# Patient Record
Sex: FEMALE | Race: WHITE | Employment: FULL TIME | URBAN - METROPOLITAN AREA
[De-identification: names, ages, dates, MRNs, and addresses within clinical notes are randomized per-mention and may not be internally consistent; named-entity substitution may affect disease eponyms.]

---

## 2017-02-09 DIAGNOSIS — F41.8 SITUATIONAL ANXIETY: ICD-10-CM

## 2017-02-09 RX ORDER — CLONAZEPAM 0.5 MG/1
TABLET ORAL
Qty: 30 TAB | Refills: 0 | Status: CANCELLED | OUTPATIENT
Start: 2017-02-09

## 2017-02-10 DIAGNOSIS — F41.8 SITUATIONAL ANXIETY: ICD-10-CM

## 2017-02-10 RX ORDER — CLONAZEPAM 0.5 MG/1
TABLET ORAL
Qty: 30 TAB | Refills: 0 | OUTPATIENT
Start: 2017-02-10 | End: 2017-04-04 | Stop reason: SDUPTHER

## 2017-02-10 RX ORDER — SERTRALINE HYDROCHLORIDE 50 MG/1
50 TABLET, FILM COATED ORAL DAILY
Qty: 90 TAB | Refills: 0 | Status: SHIPPED | OUTPATIENT
Start: 2017-02-10 | End: 2017-06-03 | Stop reason: SDUPTHER

## 2017-02-10 NOTE — TELEPHONE ENCOUNTER
From: Sarkis Forbes  To: Michelle Cummings MD  Sent: 2/9/2017 6:59 PM EST  Subject: Medication Renewal Request    Original authorizing provider: MD Sarkis Kovacs would like a refill of the following medications:  clonazePAM (KLONOPIN) 0.5 mg tablet [Gia Maravilla MD]  sertraline (ZOLOFT) 50 mg tablet Michelle Cummings MD]    Preferred pharmacy: [facility]/PHARMACY #[id number] [facility]    Comment:  Yearly appt. scheduled on 03/30

## 2017-02-10 NOTE — TELEPHONE ENCOUNTER
Verbal order per Dr. Vale Waite for calling in medications   Requested Prescriptions     Signed Prescriptions Disp Refills    clonazePAM (KLONOPIN) 0.5 mg tablet 30 Tab 0     Sig: Take 1/2 to 1 tab daily ONLY as needed     Authorizing Provider: Tiffany Johnston         Phone in.

## 2017-04-04 DIAGNOSIS — F41.8 SITUATIONAL ANXIETY: ICD-10-CM

## 2017-04-04 RX ORDER — CLONAZEPAM 0.5 MG/1
TABLET ORAL
Qty: 30 TAB | Refills: 0 | OUTPATIENT
Start: 2017-04-04 | End: 2017-06-13 | Stop reason: SDUPTHER

## 2017-04-04 RX ORDER — RIZATRIPTAN BENZOATE 10 MG/1
10 TABLET, ORALLY DISINTEGRATING ORAL
Qty: 30 TAB | Refills: 0 | Status: SHIPPED | OUTPATIENT
Start: 2017-04-04 | End: 2017-06-13 | Stop reason: SDUPTHER

## 2017-04-04 NOTE — TELEPHONE ENCOUNTER
From: Agatha More  To: Diane Cummings MD  Sent: 4/4/2017 12:23 PM EDT  Subject: Medication Renewal Request    Original authorizing provider: Diane Cummings MD    Agatha More would like a refill of the following medications:  rizatriptan (MAXALT-MLT) 10 mg disintegrating tablet [Gia Granados MD]  clonazePAM (KLONOPIN) 0.5 mg tablet Diane Cummings MD]    Preferred pharmacy: [facility]/PHARMACY #[id number] - [address] [facility]    Comment:  Physical appointment scheduled for July. I had to cancel this past yearly physical appointment in March as I had the flu with a 103 fever.

## 2017-04-05 NOTE — TELEPHONE ENCOUNTER
Verbal order per Dr. Mckeon Standing for calling in medications   Requested Prescriptions     Signed Prescriptions Disp Refills    rizatriptan (MAXALT-MLT) 10 mg disintegrating tablet 30 Tab 0     Sig: Take 1 Tab by mouth once as needed for Migraine for up to 1 dose. Authorizing Provider: Jeffrey Silva    clonazePAM (KLONOPIN) 0.5 mg tablet 30 Tab 0     Sig: Take 1/2 to 1 tab daily ONLY as needed     Authorizing Provider: Jeffrey Silva         Phone in (clonazepam) into pt's local pharmacy.

## 2017-06-03 DIAGNOSIS — F41.8 SITUATIONAL ANXIETY: ICD-10-CM

## 2017-06-04 RX ORDER — SERTRALINE HYDROCHLORIDE 50 MG/1
TABLET, FILM COATED ORAL
Qty: 90 TAB | Refills: 0 | Status: SHIPPED | OUTPATIENT
Start: 2017-06-04 | End: 2017-10-07 | Stop reason: SDUPTHER

## 2017-06-13 ENCOUNTER — TELEPHONE (OUTPATIENT)
Dept: INTERNAL MEDICINE CLINIC | Age: 27
End: 2017-06-13

## 2017-06-13 DIAGNOSIS — F41.8 SITUATIONAL ANXIETY: ICD-10-CM

## 2017-06-13 RX ORDER — RIZATRIPTAN BENZOATE 10 MG/1
10 TABLET, ORALLY DISINTEGRATING ORAL
Qty: 30 TAB | Refills: 0 | Status: SHIPPED | OUTPATIENT
Start: 2017-06-13 | End: 2017-08-21 | Stop reason: SDUPTHER

## 2017-06-13 RX ORDER — CLONAZEPAM 0.5 MG/1
TABLET ORAL
Qty: 30 TAB | Refills: 0 | OUTPATIENT
Start: 2017-06-13 | End: 2017-08-21 | Stop reason: SDUPTHER

## 2017-06-13 NOTE — TELEPHONE ENCOUNTER
From: Gregor Mccarthy  To: Mahin More MD  Sent: 6/13/2017 2:41 PM EDT  Subject: Medication Renewal Request    Original authorizing provider: MD Gregor Rodriguez would like a refill of the following medications:  rizatriptan (MAXALT-MLT) 10 mg disintegrating tablet [Gia Maravilla MD]  clonazePAM (KLONOPIN) 0.5 mg tablet Mahin More MD]    Preferred pharmacy: [facility]/PHARMACY #[id number] - [address]    Comment:

## 2017-06-13 NOTE — TELEPHONE ENCOUNTER
Provider had to reschedule 07/05/2017 @ 8:45 appointment - out of the office that day. Patient was rescheduled to 07/20/2017 @ 9:30 a letter was mailed to advise patient of the appointment change, however, letter returned to sender - no forwarding address was provided. Reached out to Elvira  in an second attempt to advise of the appointment change.  No Answer Left V/M

## 2017-07-20 ENCOUNTER — OFFICE VISIT (OUTPATIENT)
Dept: INTERNAL MEDICINE CLINIC | Age: 27
End: 2017-07-20

## 2017-07-20 VITALS
DIASTOLIC BLOOD PRESSURE: 70 MMHG | BODY MASS INDEX: 21.82 KG/M2 | TEMPERATURE: 98.7 F | WEIGHT: 139 LBS | HEIGHT: 67 IN | SYSTOLIC BLOOD PRESSURE: 110 MMHG | HEART RATE: 61 BPM | RESPIRATION RATE: 12 BRPM

## 2017-07-20 DIAGNOSIS — Z12.83 SKIN CANCER SCREENING: ICD-10-CM

## 2017-07-20 DIAGNOSIS — L81.8 HISTORY OF BEING TATOOED: ICD-10-CM

## 2017-07-20 DIAGNOSIS — Z01.419 WELL WOMAN EXAM: Primary | ICD-10-CM

## 2017-07-20 NOTE — MR AVS SNAPSHOT
Visit Information Date & Time Provider Department Dept. Phone Encounter #  
 7/20/2017  9:30 AM Nisha Pinon MD [facility] [address] [id number] Upcoming Health Maintenance Date Due INFLUENZA AGE 9 TO ADULT 8/1/2017 PAP AKA CERVICAL CYTOLOGY 7/1/2019 DTaP/Tdap/Td series (2 - Td) 1/1/2024 Allergies as of 7/20/2017  Review Complete On: 7/20/2017 By: Nisha Pinon MD  
  
 Severity Noted Reaction Type Reactions Dilaudid [Hydromorphone]  12/15/2014    Hives Current Immunizations  Reviewed on 6/29/2015 Name Date Tdap 1/1/2014 Not reviewed this visit You Were Diagnosed With   
  
 Codes Comments Well woman exam    -  Primary ICD-10-CM: F85.184 ICD-9-CM: V72.31 History of being tatooed     ICD-10-CM: L81.8 ICD-9-CM: V49.89 Skin cancer screening     ICD-10-CM: Z12.83 ICD-9-CM: V76.43 Vitals BP Pulse Temp Resp Height(growth percentile) Weight(growth percentile) 110/70 (BP 1 Location: Left arm, BP Patient Position: Sitting) 61 98.7 °F (37.1 °C) (Oral) 12 5' 7\" (1.702 m) 139 lb (63 kg) BMI OB Status Smoking Status 21.77 kg/m2 Having regular periods Never Smoker Vitals History BMI and BSA Data Body Mass Index Body Surface Area 21.77 kg/m 2 1.73 m 2 [facility]erred Pharmacy [facility] Phone [id number] [phone] Your Updated Medication List  
  
   
This list is accurate as of: 7/20/17 10:44 AM.  Always use your most recent med list.  
  
  
  
  
 clonazePAM 0.5 mg tablet Commonly known as:  Jerrlyn Heys Take 1/2 to 1 tab daily ONLY as needed  
  
 fluticasone 50 mcg/actuation nasal spray Commonly known as:  FLONASE  
1 spray by Both Nostrils route daily. MULTIVITAMIN PO Take 1 Tab by mouth daily. ondansetron hcl 4 mg tablet Commonly known as:  ZOFRAN (AS HYDROCHLORIDE) Take 1 Tab by mouth every eight (8) hours as needed for Nausea for up to 16 doses. rizatriptan 10 mg disintegrating tablet Commonly known as:  MAXALT-MLT Take 1 Tab by mouth once as needed for Migraine for up to 1 dose. sertraline 50 mg tablet Commonly known as:  ZOLOFT  
TAKE 1 TABLET BY MOUTH EVERY DAY We Performed the Following HEPATITIS C AB [30096 CPT(R)] LIPID PANEL [21422 CPT(R)] METABOLIC PANEL, COMPREHENSIVE [06593 CPT(R)] REFERRAL TO DERMATOLOGY [REF19 Custom] Comments:  
 Skin cancer screening TSH 3RD GENERATION [29778 CPT(R)] Referral Information Referral ID Referred By Referred To  
  
 [id number] Dang Leonard MD   
   [address] [place] [address] Phone: [phone] Fax: [phone] Visits Status Start Date End Date 1 New Request 7/20/17 7/20/18 If your referral has a status of pending review or denied, additional information will be sent to support the outcome of this decision. Introducing \[facility]\"" & HEALTH SERVICES! Dear Hedda Dancer: Thank you for requesting a 5skills account. Our records indicate that you already have an active 5skills account. You can access your account anytime at [email or web address] Did you know that you can access your hospital and ER discharge instructions at any time in 5skills? You can also review all of your test results from your hospital stay or ER visit. Additional Information If you have questions, please visit the Frequently Asked Questions section of the 5skills website at [email or web address] Remember, 5skills is NOT to be used for urgent needs. For medical emergencies, dial 911. Now available from your iPhone and Android! Please provide this summary of care documentation to your next provider. Your primary care clinician is listed as Oneal Victor.  If you have any questions after today's visit, please call [phone].

## 2017-07-20 NOTE — PROGRESS NOTES
Osman Allen is a 32 y.o. female and presents with Well Woman  . 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  She takes in person classes better  She does a little better in person and challeneged by online classes  She was advised to drop her classes in her nursing program  Restarted in summer with 1 class  She was on adderall  Not really intersted    Subjective:  Migraine  Little worse off zoloft but now better  Taking maxalt about 2-3 times/month   Sleep a little eratic, behaviorally not sleeping due to other activities: work and school  6-8 hours/night  4-5 cups of coffee/day    Works as LPN, 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 hoarseness  Respiratory:  negative for cough, hemoptysis, dyspnea,wheezing  CV:   negative for chest pain, palpitations, lower extremity edema  GI:   negative for nausea, vomiting, diarrhea, abdominal pain,melena  Endo:               negative for polyuria,polydipsia,polyphagia,heat intolerance  Genitourinary: negative for frequency, dysuria and hematuria  Integument:  negative for rash and pruritus  Hematologic:  negative for easy bruising and gum/nose bleeding  Musculoskel: negative for myalgias, arthralgias, back pain, muscle weakness, joint pain  Neurological:  negative for headaches, dizziness, vertigo, memory problems and gait   Behavl/Psych: negative for feelings of anxiety, depression, mood changes    Past Medical History:   Diagnosis Date    Headache     controlled with otc     History reviewed. No pertinent surgical history.   Social History     Social History    Marital status: SINGLE     Spouse name: N/A    Number of children: N/A    Years of education: N/A     Social History Main Topics    Smoking status: Never Smoker    Smokeless tobacco: Never Used    Alcohol use Yes      Comment: occasional weekend    Drug use: No    Sexual activity: Yes     Partners: Male     Birth control/ protection: Condom      Comment: followed by [place]     Other Topics Concern    None     Social History Narrative    Full time as LPN at [address] every other weekend at [employer]        No kids    boyfriend     Family History   Problem Relation Age of Onset    Diabetes Mother      prediabetic    Arthritis-osteo Father     Cancer Maternal Grandmother      lung    Heart Disease Neg Hx     Heart Attack Neg Hx        Allergies   Allergen Reactions    Dilaudid [Hydromorphone] Hives       Objective:  Visit Vitals    /70 (BP 1 Location: Left arm, BP Patient Position: Sitting)    Pulse 61    Temp 98.7 °F (37.1 °C) (Oral)    Resp 12    Ht 5' 7\" (1.702 m)    Wt 139 lb (63 kg)    BMI 21.77 kg/m2     Physical Exam:   General appearance - alert, well appearing, and in no distress  Mental status - alert, oriented to person, place, and time  Tearful when discussing anxiety  EYE-GEORGE, EOMI,  corneas normal, no foreign bodies, visual acuity normal both eyes, no periorbital cellulitis  ENT-ENT exam normal, no neck nodes or sinus tenderness  Nose - normal and patent, no erythema, discharge or polyps  Mouth - mucous membranes moist, pharynx normal without lesions  Neck - supple, no significant adenopathy   Chest - clear to auscultation, no wheezes, rales or rhonchi, symmetric air entry   Heart - normal rate, regular rhythm, normal S1, S2, no murmurs, rubs, clicks or gallops Abdomen - soft, nontender, nondistended, no masses or organomegaly  Lymph- no adenopathy palpable  Ext-peripheral pulses normal, no pedal edema, no clubbing or cyanosis  Skin-Warm and dry. no hyperpigmentation, vitiligo, or suspicious lesions  Neuro -alert, oriented, normal speech, no focal findings or movement disorder noted        Results for orders placed or performed during the hospital encounter of 07/21/16   CULTURE, STOOL   Result Value Ref Range    Special Requests: NO SPECIAL REQUESTS      Campylobacter antigen POSITIVE      Campylobacter antigen        CALLED TO AND READ BACK BY  LIZ RAJAN ON 7/21/16 AT 1626 [facility]). MS      Shiga toxin-producing E. coli Ag NEGATIVE      Culture result:        NO ROUTINE ENTERIC PATHOGENS ISOLATED INCLUDING SALMONELLA, SHIGELLA, YERSINIA, VIBRIO OR SHIGA TOXIN PRODUCING E. COLI   C. DIFFICILE (DNA)   Result Value Ref Range    C. difficile (DNA) NEGATIVE  NEG     CULTURE, URINE   Result Value Ref Range    Special Requests: NO SPECIAL REQUESTS      [place] Count >100,000  COLONIES/mL        Culture result: MIXED UROGENITAL [place] ISOLATED     CBC WITH AUTOMATED DIFF   Result Value Ref Range    WBC 4.6 3.6 - 11.0 K/uL    RBC 4.76 3.80 - 5.20 M/uL    HGB 14.9 11.5 - 16.0 g/dL    HCT 43.1 35.0 - 47.0 %    MCV 90.5 80.0 - 99.0 FL    MCH 31.3 26.0 - 34.0 PG    MCHC 34.6 30.0 - 36.5 g/dL    RDW 12.4 11.5 - 14.5 %    PLATELET 646 112 - 407 K/uL    NEUTROPHILS 57 32 - 75 %    LYMPHOCYTES 25 12 - 49 %    MONOCYTES 10 5 - 13 %    EOSINOPHILS 8 (H) 0 - 7 %    BASOPHILS 0 0 - 1 %    ABS. NEUTROPHILS 2.6 1.8 - 8.0 K/UL    ABS. LYMPHOCYTES 1.1 0.8 - 3.5 K/UL    ABS. MONOCYTES 0.4 0.0 - 1.0 K/UL    ABS. EOSINOPHILS 0.4 0.0 - 0.4 K/UL    ABS.  BASOPHILS 0.0 0.0 - 0.1 K/UL   METABOLIC PANEL, COMPREHENSIVE   Result Value Ref Range    Sodium 139 136 - 145 mmol/L    Potassium 3.7 3.5 - 5.1 mmol/L    Chloride 104 97 - 108 mmol/L    CO2 27 21 - 32 mmol/L    Anion gap 8 5 - 15 mmol/L    Glucose 89 65 - 100 mg/dL    BUN 5 (L) 6 - 20 MG/DL    Creatinine 0.83 0.55 - 1.02 MG/DL    BUN/Creatinine ratio 6 (L) 12 - 20      GFR est AA >60 >60 ml/min/1.73m2    GFR est non-AA >60 >60 ml/min/1.73m2    Calcium 9.4 8.5 - 10.1 MG/DL    Bilirubin, total 0.5 0.2 - 1.0 MG/DL    ALT (SGPT) 30 12 - 78 U/L    AST (SGOT) 18 15 - 37 U/L    Alk. phosphatase 55 45 - 117 U/L    Protein, total 8.6 (H) 6.4 - 8.2 g/dL    Albumin 4.2 3.5 - 5.0 g/dL    Globulin 4.4 (H) 2.0 - 4.0 g/dL    A-G Ratio 1.0 (L) 1.1 - 2.2     URINALYSIS W/ REFLEX CULTURE   Result Value Ref Range    Color YELLOW/STRAW      Appearance HAZY (A) CLEAR      Specific gravity 1.010 1.003 - 1.030      pH (UA) 7.0 5.0 - 8.0      Protein NEGATIVE  NEG mg/dL    Glucose NEGATIVE  NEG mg/dL    Ketone TRACE (A) NEG mg/dL    Bilirubin NEGATIVE  NEG      Blood TRACE (A) NEG      Urobilinogen 0.2 0.2 - 1.0 EU/dL    Nitrites NEGATIVE  NEG      Leukocyte Esterase MODERATE (A) NEG      WBC 0-4 0 - 4 /hpf    RBC 0-5 0 - 5 /hpf    Epithelial cells MANY (A) FEW /lpf    Bacteria 1+ (A) NEG /hpf    UA:UC IF INDICATED URINE CULTURE ORDERED (A) CNI     LIPASE   Result Value Ref Range    Lipase 89 73 - 393 U/L   WBC, STOOL   Result Value Ref Range    White blood cells, stool >20 0 - 4 /HPF   HCG URINE, QL. - POC   Result Value Ref Range    Pregnancy test,urine (POC) NEGATIVE  NEG       Prevention    Cardiovascular profile  Family hx  Exercising:  Yoga, walking, running  Blood pressure:  Health healthy diet:  Diabetes:  Cholesterol:  Renal function:      Cancer risk profile  Mammogram  Lung  Colonoscopy  Skin nonhealing in 2 weeks--mole flat and looks like may be changing  Gyn abnormal bleeding/discharge/abd pain/pressure- [facility] 2016  Discussion with gyn re: birth control, not on bcp.  She is using condoms with boyfriend  Eventually wants to       Thyroid sx  Weight stable   No sx    Osteopenia prevention  Calcium 1000mg/day yes  Vitamin D 800iu/day yes    Mental health scale: Took clonazepma when coming off zoloft    Depression  Anxiety  Sleep # of hours:  Energy Level:        Immunizations  TDAP 10/14  Pneumonia vaccine  Flu vaccine 10/14  PPD negative    HPV- completed gardisil vaccine          Alana was seen today for well woman. Diagnoses and all orders for this visit:    Well woman exam  Pt appears in good physical health    She reports hx of childhood add. She is having difficulty with online classes. She was never formally tested for add but was tried on medication. Discussed would like formal testing. She does not want to use daily meds but would be interested in behavioral approaches for ADD. I disucssed use of wellbutrin or effexor and she defers for now. Will try to get her a counselor for testing and to help with behavioral tools  -     LIPID PANEL  -     TSH 3RD GENERATION  -     METABOLIC PANEL, COMPREHENSIVE    History of being tatooed  -     HEPATITIS C AB    Skin cancer screening  -     REFERRAL TO DERMATOLOGY    This note will not be viewable in [address]

## 2017-07-21 LAB
ALBUMIN SERPL-MCNC: 4.7 G/DL (ref 3.5–5.5)
ALBUMIN/GLOB SERPL: 1.5 {RATIO} (ref 1.2–2.2)
ALP SERPL-CCNC: 43 IU/L (ref 39–117)
ALT SERPL-CCNC: 17 IU/L (ref 0–32)
AST SERPL-CCNC: 18 IU/L (ref 0–40)
BILIRUB SERPL-MCNC: 0.5 MG/DL (ref 0–1.2)
BUN SERPL-MCNC: 12 MG/DL (ref 6–20)
BUN/CREAT SERPL: 18 (ref 9–23)
CALCIUM SERPL-MCNC: 9.6 MG/DL (ref 8.7–10.2)
CHLORIDE SERPL-SCNC: 100 MMOL/L (ref 96–106)
CHOLEST SERPL-MCNC: 140 MG/DL (ref 100–199)
CO2 SERPL-SCNC: 23 MMOL/L (ref 18–29)
CREAT SERPL-MCNC: 0.67 MG/DL (ref 0.57–1)
GLOBULIN SER CALC-MCNC: 3.1 G/DL (ref 1.5–4.5)
GLUCOSE SERPL-MCNC: 91 MG/DL (ref 65–99)
HCV AB S/CO SERPL IA: <0.1 S/CO RATIO (ref 0–0.9)
HDLC SERPL-MCNC: 78 MG/DL
INTERPRETATION, 910389: NORMAL
LDLC SERPL CALC-MCNC: 52 MG/DL (ref 0–99)
POTASSIUM SERPL-SCNC: 4.3 MMOL/L (ref 3.5–5.2)
PROT SERPL-MCNC: 7.8 G/DL (ref 6–8.5)
SODIUM SERPL-SCNC: 143 MMOL/L (ref 134–144)
TRIGL SERPL-MCNC: 48 MG/DL (ref 0–149)
TSH SERPL DL<=0.005 MIU/L-ACNC: 3.42 UIU/ML (ref 0.45–4.5)
VLDLC SERPL CALC-MCNC: 10 MG/DL (ref 5–40)

## 2017-10-22 DIAGNOSIS — F41.8 SITUATIONAL ANXIETY: ICD-10-CM

## 2017-10-23 RX ORDER — CLONAZEPAM 0.5 MG/1
TABLET ORAL
Qty: 20 TAB | Refills: 0 | OUTPATIENT
Start: 2017-10-23 | End: 2017-12-04 | Stop reason: SDUPTHER

## 2017-10-23 RX ORDER — RIZATRIPTAN BENZOATE 10 MG/1
10 TABLET, ORALLY DISINTEGRATING ORAL
Qty: 30 TAB | Refills: 0 | Status: SHIPPED | OUTPATIENT
Start: 2017-10-23 | End: 2017-12-04 | Stop reason: SDUPTHER

## 2017-10-23 NOTE — TELEPHONE ENCOUNTER
Verbal order per Dr. Josesito Perales for calling in medications   Requested Prescriptions     Signed Prescriptions Disp Refills    rizatriptan (MAXALT-MLT) 10 mg disintegrating tablet 30 Tab 0     Sig: Take 1 Tab by mouth once as needed for Migraine for up to 1 dose.      Authorizing Provider: Vero Burks    clonazePAM (KLONOPIN) 0.5 mg tablet 20 Tab 0     Sig: Take 1/2 to 1 tab daily ONLY as needed     Authorizing Provider: Vero Burks           Phone in (Clonazepam) into pt's local pharmacy

## 2017-10-23 NOTE — TELEPHONE ENCOUNTER
From: Estefani Washburn  To: Keri Parks MD  Sent: 10/22/2017 8:20 PM EDT  Subject: Medication Renewal Request    Original authorizing provider: MD Jennifer Blackwella Maddison would like a refill of the following medications:  rizatriptan (MAXALT-MLT) 10 mg disintegrating tablet [Gia Maravilla MD]  clonazePAM (KLONOPIN) 0.5 mg tablet Keri Parks MD]    Preferred pharmacy: [facility]/PHARMACY #[id number] - [address] [place]    Comment:

## 2017-12-04 DIAGNOSIS — F41.8 SITUATIONAL ANXIETY: ICD-10-CM

## 2017-12-05 RX ORDER — RIZATRIPTAN BENZOATE 10 MG/1
10 TABLET, ORALLY DISINTEGRATING ORAL
Qty: 30 TAB | Refills: 0 | Status: SHIPPED | OUTPATIENT
Start: 2017-12-05 | End: 2018-02-28 | Stop reason: SDUPTHER

## 2017-12-05 RX ORDER — CLONAZEPAM 0.5 MG/1
TABLET ORAL
Qty: 20 TAB | Refills: 0 | OUTPATIENT
Start: 2017-12-05 | End: 2018-01-15 | Stop reason: SDUPTHER

## 2017-12-05 NOTE — TELEPHONE ENCOUNTER
From: David Hernandes  To: Barbara Rodrigues MD  Sent: 12/4/2017 3:33 PM EST  Subject: Medication Renewal Request    Original authorizing provider: MD David Leos would like a refill of the following medications:  rizatriptan (MAXALT-MLT) 10 mg disintegrating tablet [Gia Maravilla MD]  clonazePAM (KLONOPIN) 0.5 mg tablet Barbara Rodrigues MD]    Preferred pharmacy: [facility]/PHARMACY #[id number] - [address]    Comment:

## 2017-12-07 ENCOUNTER — OFFICE VISIT (OUTPATIENT)
Dept: INTERNAL MEDICINE CLINIC | Age: 27
End: 2017-12-07

## 2017-12-07 VITALS
HEART RATE: 66 BPM | DIASTOLIC BLOOD PRESSURE: 71 MMHG | OXYGEN SATURATION: 98 % | WEIGHT: 139.8 LBS | SYSTOLIC BLOOD PRESSURE: 121 MMHG | BODY MASS INDEX: 21.94 KG/M2 | RESPIRATION RATE: 12 BRPM | TEMPERATURE: 98.3 F | HEIGHT: 67 IN

## 2017-12-07 DIAGNOSIS — M26.609 TMJ DYSFUNCTION: Primary | ICD-10-CM

## 2017-12-07 DIAGNOSIS — R19.8 TEETH CLENCHING: ICD-10-CM

## 2017-12-07 RX ORDER — METHOCARBAMOL 500 MG/1
500 TABLET, FILM COATED ORAL
Qty: 40 TAB | Refills: 0 | Status: SHIPPED | OUTPATIENT
Start: 2017-12-07 | End: 2018-02-08 | Stop reason: ALTCHOICE

## 2017-12-07 RX ORDER — DICLOFENAC SODIUM 75 MG/1
75 TABLET, DELAYED RELEASE ORAL 2 TIMES DAILY
Qty: 30 TAB | Refills: 0 | Status: SHIPPED | OUTPATIENT
Start: 2017-12-07 | End: 2018-02-08 | Stop reason: ALTCHOICE

## 2017-12-07 NOTE — MR AVS SNAPSHOT
Visit Information Date & Time Provider Department Dept. Phone Encounter #  
 12/7/2017 11:20 AM Ge Schneider NP [facility] [address] [id number] Upcoming Health Maintenance Date Due  
 PAP AKA CERVICAL CYTOLOGY 7/1/2019 DTaP/Tdap/Td series (2 - Td) 1/1/2024 Allergies as of 12/7/2017  Review Complete On: 12/7/2017 By: Ge Schneider NP Severity Noted Reaction Type Reactions Dilaudid [Hydromorphone]  12/15/2014    Hives Current Immunizations  Reviewed on 12/5/2017 Name Date Influenza Vaccine 10/30/2017 Tdap 1/1/2014 Not reviewed this visit You Were Diagnosed With   
  
 Codes Comments TMJ dysfunction    -  Primary ICD-10-CM: M26.609 ICD-9-CM: 524.60 Vitals BP Pulse Temp Resp Height(growth percentile) Weight(growth percentile) 121/71 (BP 1 Location: Right arm, BP Patient Position: Sitting) 66 98.3 °F (36.8 °C) (Oral) 12 5' 7\" (1.702 m) 139 lb 12.8 oz (63.4 kg) LMP SpO2 BMI OB Status Smoking Status 11/10/2017 (Approximate) 98% 21.9 kg/m2 Having regular periods Never Smoker BMI and BSA Data Body Mass Index Body Surface Area  
 21.9 kg/m 2 1.73 m 2 Preferred Pharmacy [id number] [phone] Your Updated Medication List  
  
   
This list is accurate as of: 12/7/17 12:01 PM.  Always use your most recent med list.  
  
  
  
  
 clonazePAM 0.5 mg tablet Commonly known as:  Kiana Pelt Take 1/2 to 1 tab daily ONLY as needed  
  
 diclofenac EC 75 mg EC tablet Commonly known as:  VOLTAREN Take 1 Tab by mouth two (2) times a day. fluticasone 50 mcg/actuation nasal spray Commonly known as:  FLONASE  
1 spray by Both Nostrils route daily. methocarbamol 500 mg tablet Commonly known as:  ROBAXIN Take 1 Tab by mouth four (4) times daily as needed.   
  
 MULTIVITAMIN PO  
 Take 1 Tab by mouth daily. rizatriptan 10 mg disintegrating tablet Commonly known as:  MAXALT-MLT Take 1 Tab by mouth once as needed for Migraine for up to 1 dose. Prescriptions Sent to Pharmacy Refills  
 methocarbamol (ROBAXIN) 500 mg tablet 0 Sig: Take 1 Tab by mouth four (4) times daily as needed. Class: Normal  
 Pharmacy: [address] [place] [address] Ph #: [phone] Route: Oral  
 diclofenac EC (VOLTAREN) 75 mg EC tablet 0 Sig: Take 1 Tab by mouth two (2) times a day. Class: Normal  
 Pharmacy: [address] [place] [address] Ph #: [phone] Route: Oral  
  
Patient Instructions Temporomandibular Disorder: Care Instructions Your Care Instructions Temporomandibular (TM) disorders are a problem with the muscles and joints that connect your jaw to your skull. They cause pain when you open your mouth, chew, or yawn. You may feel this pain on one or both sides. TM disorders are often caused by tight jaw muscles. The tightness can be caused by clenching or grinding your teeth. This may happen when you have a lot of stress in your life. If you lower your stress, you may be able to stop clenching or grinding your teeth. This will help relax your jaw and reduce your pain. You may also be able to do some things at home to feel better. But if none of this works, your doctor may prescribe medicine to help relax your muscles and control the pain. Follow-up care is a key part of your treatment and safety. Be sure to make and go to all appointments, and call your doctor if you are having problems. It's also a good idea to know your test results and keep a list of the medicines you take. How can you care for yourself at home? · Put a warm, moist cloth or heating pad set on low on your jaw. Do this for 10 to 20 minutes at a time. Put a thin cloth between the heating pad and your skin. · Avoid hard or chewy foods that cause your jaws to work very hard. Examples include popcorn, jerky, tough meats, chewy breads, gum, and raw apples and carrots. · Choose softer foods that are easy to chew. These include eggs, yogurt, and soup. · Cut your food into small pieces. Chew slowly. · If your jaw gets too painful to chew, or if it locks, you may need to puree your food for a few days or weeks. · To relax your jaw, repeat this exercise for a few minutes every morning and evening. Watch yourself in a mirror. Gently open and close your mouth. Move your jaw straight up and down. But don't do this if it makes your pain worse. · Get at least 30 minutes of exercise on most days of the week to relieve stress. Walking is a good choice. You also may want to do other activities, such as running, swimming, cycling, or playing tennis or team sports. · Do not: 
¨ Hold a phone between your shoulder and your jaw. ¨ Open your mouth all the way, like when you sing loudly or yawn. ¨ Clench or grind your teeth, bite your lips, or chew your fingernails. ¨ Clench things such as pens, pipes, or cigars between your teeth. When should you call for help? Call your doctor now or seek immediate medical care if: 
? · Your jaw is locked open or shut or it is hard to move your jaw. ? Watch closely for changes in your health, and be sure to contact your doctor if: 
? · Your jaw pain gets worse. ? · Your face is swollen. ? · You do not get better as expected. Where can you learn more? Go to http://donovan-flaco.info/. Enter X362 in the search box to learn more about \"Temporomandibular Disorder: Care Instructions. \" Current as of: May 12, 2017 Content Version: 11.4 © 8816-3644 [employer]. Care instructions adapted under license by [employer] (which disclaims liability or warranty for this information).  If you have questions about a medical condition or this instruction, always ask your healthcare professional. [address] any warranty or liability for your use of this information. Introducing [facility] & HEALTH SERVICES! Dear Jim Perez: Thank you for requesting a Gruvi account. Our records indicate that you already have an active Gruvi account. You can access your account anytime at [email or web address] Did you know that you can access your hospital and ER discharge instructions at any time in Gruvi? You can also review all of your test results from your hospital stay or ER visit. Additional Information If you have questions, please visit the Frequently Asked Questions section of the Gruvi website at [email or web address] Remember, Gruvi is NOT to be used for urgent needs. For medical emergencies, dial 911. Now available from your iPhone and Android! Please provide this summary of care documentation to your next provider. Your primary care clinician is listed as Marietta Fang. If you have any questions after today's visit, please call [phone].

## 2017-12-07 NOTE — PATIENT INSTRUCTIONS
Temporomandibular Disorder: Care Instructions  Your Care Instructions    Temporomandibular (TM) disorders are a problem with the muscles and joints that connect your jaw to your skull. They cause pain when you open your mouth, chew, or yawn. You may feel this pain on one or both sides. TM disorders are often caused by tight jaw muscles. The tightness can be caused by clenching or grinding your teeth. This may happen when you have a lot of stress in your life. If you lower your stress, you may be able to stop clenching or grinding your teeth. This will help relax your jaw and reduce your pain. You may also be able to do some things at home to feel better. But if none of this works, your doctor may prescribe medicine to help relax your muscles and control the pain. Follow-up care is a key part of your treatment and safety. Be sure to make and go to all appointments, and call your doctor if you are having problems. It's also a good idea to know your test results and keep a list of the medicines you take. How can you care for yourself at home? · Put a warm, moist cloth or heating pad set on low on your jaw. Do this for 10 to 20 minutes at a time. Put a thin cloth between the heating pad and your skin. · Avoid hard or chewy foods that cause your jaws to work very hard. Examples include popcorn, jerky, tough meats, chewy breads, gum, and raw apples and carrots. · Choose softer foods that are easy to chew. These include eggs, yogurt, and soup. · Cut your food into small pieces. Chew slowly. · If your jaw gets too painful to chew, or if it locks, you may need to puree your food for a few days or weeks. · To relax your jaw, repeat this exercise for a few minutes every morning and evening. Watch yourself in a mirror. Gently open and close your mouth. Move your jaw straight up and down. But don't do this if it makes your pain worse.   · Get at least 30 minutes of exercise on most days of the week to relieve stress. Walking is a good choice. You also may want to do other activities, such as running, swimming, cycling, or playing tennis or team sports. · Do not:  ¨ Hold a phone between your shoulder and your jaw. ¨ Open your mouth all the way, like when you sing loudly or yawn. ¨ Clench or grind your teeth, bite your lips, or chew your fingernails. ¨ Clench things such as pens, pipes, or cigars between your teeth. When should you call for help? Call your doctor now or seek immediate medical care if:  ? · Your jaw is locked open or shut or it is hard to move your jaw. ? Watch closely for changes in your health, and be sure to contact your doctor if:  ? · Your jaw pain gets worse. ? · Your face is swollen. ? · You do not get better as expected. Where can you learn more? Go to http://donovan-flaco.info/. Enter F014 in the search box to learn more about \"Temporomandibular Disorder: Care Instructions. \"  Current as of: May 12, 2017  Content Version: 11.4  © 4059-7121 [employer]. Care instructions adapted under license by [employer] (which disclaims liability or warranty for this information). If you have questions about a medical condition or this instruction, always ask your healthcare professional. [address] any warranty or liability for your use of this information.

## 2017-12-07 NOTE — PROGRESS NOTES
HISTORY OF PRESENT ILLNESS  Alana Sandoval is a 32 y.o. female. HPI  Presents with complaints of tenderness to Left TMJ area for the past 3 weeks; has been very busy at work and she notes that she tends to clench her teeth when stressed. 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  Often finds she wakes up in am with teeth clenched. Has been having difficulty chewing and unable to open mouth wide. Has been treated in past with steroids for similar symptoms but she would prefer to avoid steroids if possible. Has been taking Advil 600 mg 4 times daily for the past 2 weeks with only minimal improvement. Due to see her dentist in the next few weeks. Review of Systems   Constitutional: Negative for chills, fever and malaise/fatigue. HENT: Positive for ear pain. Negative for congestion and sore throat. Respiratory: Negative for cough. Cardiovascular: Negative for chest pain and palpitations. Gastrointestinal: Negative for nausea and vomiting. Skin: Negative for rash. Neurological: Positive for headaches. Negative for dizziness and tingling. /71 (BP 1 Location: Right arm, BP Patient Position: Sitting)  Pulse 66  Temp 98.3 °F (36.8 °C) (Oral)   Resp 12  Ht 5' 7\" (1.702 m)  Wt 139 lb 12.8 oz (63.4 kg)  LMP 11/10/2017 (Approximate)  SpO2 98%  BMI 21.9 kg/m2  Physical Exam   Constitutional: She is oriented to person, place, and time. She appears well-developed and well-nourished. HENT:   Head: Normocephalic and atraumatic. Right Ear: External ear normal.   Left Ear: External ear normal.   Nose: Nose normal.   Tenderness to left TMJ area   Neck: Normal range of motion. Neck supple. No thyromegaly present. Cardiovascular: Normal rate and regular rhythm. Pulmonary/Chest: Effort normal and breath sounds normal.   Lymphadenopathy:     She has no cervical adenopathy. Neurological: She is alert and oriented to person, place, and time. Psychiatric: She has a normal mood and affect. Her behavior is normal.   Nursing note and vitals reviewed. ASSESSMENT and PLAN  Diagnoses and all orders for this visit:    1. TMJ dysfunction  -     methocarbamol (ROBAXIN) 500 mg tablet; Take 1 Tab by mouth four (4) times daily as needed. -     diclofenac EC (VOLTAREN) 75 mg EC tablet; Take 1 Tab by mouth two (2) times a day. 2. Teeth clenching - advised her to speak to her dentist about bite block to help with nighttime clenching of teeth; cautioned regarding sedation with muscle relaxants  -     methocarbamol (ROBAXIN) 500 mg tablet; Take 1 Tab by mouth four (4) times daily as needed.       reviewed diet, exercise and weight control  reviewed medications and side effects in detail

## 2018-01-15 DIAGNOSIS — F41.8 SITUATIONAL ANXIETY: ICD-10-CM

## 2018-01-15 NOTE — TELEPHONE ENCOUNTER
From: Peg Fees  To: Babetta Severance, MD  Sent: 1/15/2018 3:26 PM EST  Subject: Medication Renewal Request    Original authorizing provider: Babetta Severance, MD    Peg Fees would like a refill of the following medications:  clonazePAM (KLONOPIN) 0.5 mg tablet Babetta Severance, MD]    Preferred pharmacy: [facility]/PHARMACY #[id number] - [address] [address].:

## 2018-01-16 RX ORDER — CLONAZEPAM 0.5 MG/1
TABLET ORAL
Qty: 20 TAB | Refills: 0 | OUTPATIENT
Start: 2018-01-16 | End: 2018-02-08 | Stop reason: SDUPTHER

## 2018-01-16 NOTE — TELEPHONE ENCOUNTER
Verbal order per Dr. Bowers Mins for calling in medications   Requested Prescriptions     Signed Prescriptions Disp Refills    clonazePAM (KLONOPIN) 0.5 mg tablet 20 Tab 0     Sig: Take 1/2 to 1 tab daily ONLY as needed     Authorizing Provider: Palma Jaffe         Phone in.

## 2018-02-08 ENCOUNTER — OFFICE VISIT (OUTPATIENT)
Dept: INTERNAL MEDICINE CLINIC | Age: 28
End: 2018-02-08

## 2018-02-08 VITALS
BODY MASS INDEX: 22.6 KG/M2 | SYSTOLIC BLOOD PRESSURE: 107 MMHG | HEART RATE: 77 BPM | TEMPERATURE: 98.8 F | WEIGHT: 144 LBS | DIASTOLIC BLOOD PRESSURE: 74 MMHG | OXYGEN SATURATION: 98 % | RESPIRATION RATE: 18 BRPM | HEIGHT: 67 IN

## 2018-02-08 DIAGNOSIS — R73.02 GLUCOSE INTOLERANCE (IMPAIRED GLUCOSE TOLERANCE): ICD-10-CM

## 2018-02-08 DIAGNOSIS — F41.8 SITUATIONAL ANXIETY: ICD-10-CM

## 2018-02-08 DIAGNOSIS — R68.89 COLD INTOLERANCE: Primary | ICD-10-CM

## 2018-02-08 RX ORDER — CLONAZEPAM 0.5 MG/1
TABLET ORAL
Qty: 20 TAB | Refills: 0 | Status: SHIPPED | OUTPATIENT
Start: 2018-02-08 | End: 2018-02-28 | Stop reason: SDUPTHER

## 2018-02-08 NOTE — PROGRESS NOTES
Chief Complaint   Patient presents with    Other     cold intolerance     Cold intolerance  Cold intolerance in hands and feet and now progressive to her scalp. She reports she took her temperature 95. 7 and then 96.1. She reports she was shivering. She has had sx in the past but now progressively worse. She is now sleeping with a woolen hat on her head because her scalp gets to cold. She is the designated  and does not drink etoh. She is not on rx meds and does not take anything over the counter. No herbal meds. She was told it was anxiety and does not mind if it is but wants to make sure it is nothing else. Sx are severe when it happens and shivering occurs. Sx have moved to her scalp. She can not take any meds when occurs. better with rewarming and worse when no hat. She is getting her menses every 26-28 days. She is not on bcp or iud and does not desire contraception right now. 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 30 Tab 0    MULTIVITAMIN PO Take 1 Tab by mouth daily.  fluticasone (FLONASE) 50 mcg/actuation nasal spray 1 spray by Both Nostrils route daily. (Patient taking differently: 1 Spray by Both Nostrils route daily as needed.) 1 Bottle 0     Allergies   Allergen Reactions    Dilaudid [Hydromorphone] Hives       Review of Systems - General ROS: negative for - fatigue, fever, hot flashes or malaise  Cardiovascular ROS: no chest pain or dyspnea on exertion  Respiratory ROS: no cough, shortness of breath, or wheezing    Visit Vitals    /74 (BP 1 Location: Left arm, BP Patient Position: Sitting)    Pulse 77    Temp 98.8 °F (37.1 °C) (Oral)    Resp 18    Ht 5' 7\" (1.702 m)    Wt 144 lb (65.3 kg)    LMP 01/28/2018    SpO2 98%    BMI 22.55 kg/m2     General Appearance:  Well developed, well nourished,alert and oriented x 3, and individual in no acute distress. Ears/Nose/Mouth/Throat:   Hearing grossly normal.         Neck: Supple, no lad, no bruits   Chest:   Lungs clear to auscultation bilaterally. Cardiovascular:  Regular rate and rhythm, S1, S2 normal, no murmur. Abdomen:   Soft, non-tender, bowel sounds are active. Extremities: No edema bilaterally. Skin: Warm and dry, no suspicious lesions                 Diagnoses and all orders for this visit:    1.  Cold intolerance  Noted on up to date that she specifically does not have hypothermia but she is getting close  Will monitor closely  R/o thyroid etiolgy and CT disease  -     URINALYSIS W/ RFLX MICROSCOPIC  -     CBC W/O DIFF  -     METABOLIC PANEL, COMPREHENSIVE  -     TSH 3RD GENERATION  -     VITAMIN D, 25 HYDROXY  -     HEMOGLOBIN A1C WITH EAG  - SED RATE (ESR)  -     GELACIO W/REFLEX    2. Situational anxiety  Left her rx in hotel in [place]. No hx of aberrant behavior  -     clonazePAM (KLONOPIN) 0.5 mg tablet; Take 1/2 to 1 tab daily ONLY as needed    3. Glucose intolerance (impaired glucose tolerance)  Will check bs and a1c in case contributing  -     HEMOGLOBIN A1C WITH EAG        I spent 25 min with this patient and >50% of the time was spent on counseling and management of severe cold intolerance, will r/o thyroid disorder, CTD. It may very well be a manifestation of anxiety but she does not seem very anxious on exam today. Will continue to monitor . If labs all wnl and sx continue/progress consider having Neurology evaluate for hypothalmic d/o. This note will not be viewable in [address]

## 2018-02-09 LAB
25(OH)D3+25(OH)D2 SERPL-MCNC: 36.4 NG/ML (ref 30–100)
ALBUMIN SERPL-MCNC: 4.5 G/DL (ref 3.5–5.5)
ALBUMIN/GLOB SERPL: 1.4 {RATIO} (ref 1.2–2.2)
ALP SERPL-CCNC: 41 IU/L (ref 39–117)
ALT SERPL-CCNC: 16 IU/L (ref 0–32)
ANA SER QL: NEGATIVE
APPEARANCE UR: CLEAR
AST SERPL-CCNC: 18 IU/L (ref 0–40)
BILIRUB SERPL-MCNC: 0.4 MG/DL (ref 0–1.2)
BILIRUB UR QL STRIP: NEGATIVE
BUN SERPL-MCNC: 12 MG/DL (ref 6–20)
BUN/CREAT SERPL: 20 (ref 9–23)
CALCIUM SERPL-MCNC: 9.5 MG/DL (ref 8.7–10.2)
CHLORIDE SERPL-SCNC: 98 MMOL/L (ref 96–106)
CO2 SERPL-SCNC: 25 MMOL/L (ref 18–29)
COLOR UR: YELLOW
CREAT SERPL-MCNC: 0.6 MG/DL (ref 0.57–1)
ERYTHROCYTE [DISTWIDTH] IN BLOOD BY AUTOMATED COUNT: 12.9 % (ref 12.3–15.4)
ERYTHROCYTE [SEDIMENTATION RATE] IN BLOOD BY WESTERGREN METHOD: 2 MM/HR (ref 0–32)
EST. AVERAGE GLUCOSE BLD GHB EST-MCNC: 91 MG/DL
GFR SERPLBLD CREATININE-BSD FMLA CKD-EPI: 125 ML/MIN/1.73
GFR SERPLBLD CREATININE-BSD FMLA CKD-EPI: 144 ML/MIN/1.73
GLOBULIN SER CALC-MCNC: 3.3 G/DL (ref 1.5–4.5)
GLUCOSE SERPL-MCNC: 87 MG/DL (ref 65–99)
GLUCOSE UR QL: NEGATIVE
HBA1C MFR BLD: 4.8 % (ref 4.8–5.6)
HCT VFR BLD AUTO: 39.3 % (ref 34–46.6)
HGB BLD-MCNC: 13.1 G/DL (ref 11.1–15.9)
HGB UR QL STRIP: NEGATIVE
KETONES UR QL STRIP: NEGATIVE
LEUKOCYTE ESTERASE UR QL STRIP: NEGATIVE
MCH RBC QN AUTO: 29.7 PG (ref 26.6–33)
MCHC RBC AUTO-ENTMCNC: 33.3 G/DL (ref 31.5–35.7)
MCV RBC AUTO: 89 FL (ref 79–97)
MICRO URNS: NORMAL
NITRITE UR QL STRIP: NEGATIVE
PH UR STRIP: 7 [PH] (ref 5–7.5)
PLATELET # BLD AUTO: 275 X10E3/UL (ref 150–379)
POTASSIUM SERPL-SCNC: 3.8 MMOL/L (ref 3.5–5.2)
PROT SERPL-MCNC: 7.8 G/DL (ref 6–8.5)
PROT UR QL STRIP: NEGATIVE
RBC # BLD AUTO: 4.41 X10E6/UL (ref 3.77–5.28)
SODIUM SERPL-SCNC: 139 MMOL/L (ref 134–144)
SP GR UR: 1.01 (ref 1–1.03)
TSH SERPL DL<=0.005 MIU/L-ACNC: 3.27 UIU/ML (ref 0.45–4.5)
UROBILINOGEN UR STRIP-MCNC: 0.2 MG/DL (ref 0.2–1)
WBC # BLD AUTO: 5 X10E3/UL (ref 3.4–10.8)

## 2018-02-28 DIAGNOSIS — F41.8 SITUATIONAL ANXIETY: ICD-10-CM

## 2018-03-01 RX ORDER — CLONAZEPAM 0.5 MG/1
TABLET ORAL
Qty: 20 TAB | Refills: 0 | OUTPATIENT
Start: 2018-03-01 | End: 2018-03-29 | Stop reason: SDUPTHER

## 2018-03-01 RX ORDER — RIZATRIPTAN BENZOATE 10 MG/1
10 TABLET, ORALLY DISINTEGRATING ORAL
Qty: 30 TAB | Refills: 0 | Status: SHIPPED | OUTPATIENT
Start: 2018-03-01 | End: 2018-06-03 | Stop reason: SDUPTHER

## 2018-03-01 RX ORDER — FLUTICASONE PROPIONATE 50 MCG
1 SPRAY, SUSPENSION (ML) NASAL DAILY
Qty: 1 BOTTLE | Refills: 0 | Status: SHIPPED | OUTPATIENT
Start: 2018-03-01 | End: 2018-11-14 | Stop reason: ALTCHOICE

## 2018-03-01 NOTE — TELEPHONE ENCOUNTER
From: Shasha Victor  To: Carlos Nichols MD  Sent: 2/28/2018 3:24 PM EST  Subject: Medication Renewal Request    Original authorizing provider: MD Shasha Roberson would like a refill of the following medications:  fluticasone (FLONASE) 50 mcg/actuation nasal spray [Gia Maravilla MD]  rizatriptan (MAXALT-MLT) 10 mg disintegrating tablet [Gia Maravilla MD]  clonazePAM (KLONOPIN) 0.5 mg tablet Carlos Nichols MD]    Preferred pharmacy: [facility]/PHARMACY #[id number] [facility]    Comment:  [facility]. Will be loosing insurance in two weeks due to job transition.  That is reason for early refill request

## 2018-03-01 NOTE — TELEPHONE ENCOUNTER
Verbal order per Dr. Yayo Oquendo for calling in medications   Requested Prescriptions     Signed Prescriptions Disp Refills    fluticasone (FLONASE) 50 mcg/actuation nasal spray 1 Bottle 0     [place] by Both Nostrils route daily. Authorizing Provider: Sandra Leyva     Ordering User: Evelyne Kolb    rizatriptan (MAXALT-MLT) 10 mg disintegrating tablet 30 Tab 0     Sig: Take 1 Tab by mouth once as needed for Migraine for up to 1 dose. Authorizing Provider: Sandra Leyva     Ordering User: JEFF BECKER    clonazePAM (KLONOPIN) 0.5 mg tablet 20 Tab 0     Sig: Take 1/2 to 1 tab daily ONLY as needed     Authorizing Provider: Sandra Leyva         Phone in (clonazepam) into pt's local pharmacy.

## 2018-06-03 DIAGNOSIS — F41.8 SITUATIONAL ANXIETY: ICD-10-CM

## 2018-06-04 RX ORDER — CLONAZEPAM 0.5 MG/1
TABLET ORAL
Qty: 10 TAB | Refills: 0 | OUTPATIENT
Start: 2018-06-04 | End: 2018-07-25 | Stop reason: SDUPTHER

## 2018-06-04 RX ORDER — RIZATRIPTAN BENZOATE 10 MG/1
10 TABLET, ORALLY DISINTEGRATING ORAL
Qty: 30 TAB | Refills: 0 | Status: SHIPPED | OUTPATIENT
Start: 2018-06-04 | End: 2018-06-13 | Stop reason: SDUPTHER

## 2018-06-04 NOTE — TELEPHONE ENCOUNTER
Verbal order per Dr. Dayanna Mujica for calling in medications   Requested Prescriptions     Signed Prescriptions Disp Refills    rizatriptan (MAXALT-MLT) 10 mg disintegrating tablet 30 Tab 0     Sig: Take 1 Tab by mouth once as needed for Migraine for up to 1 dose. Authorizing Provider: Mayelin Galvin    clonazePAM (KLONOPIN) 0.5 mg tablet 10 Tab 0     Sig: Take 1/2 to 1 tab daily ONLY as needed     Authorizing Provider: Mayelin Galvin           Phone in (clonazepam) into pt's local pharmacy per PCP.

## 2018-06-04 NOTE — TELEPHONE ENCOUNTER
From: Mukesh Mcintosh  To: Nisha Pinon MD  Sent: 6/3/2018 7:17 PM EDT  Subject: Medication Renewal Request    Original authorizing provider: MD Mukesh Smith would like a refill of the following medications:  rizatriptan (MAXALT-MLT) 10 mg disintegrating tablet [Gia Maravilla MD]  clonazePAM (KLONOPIN) 0.5 mg tablet Nisha Pinon MD]    Preferred pharmacy: [facility]/PHARMACY #[id number] - [place]    Comment:

## 2018-06-13 RX ORDER — RIZATRIPTAN BENZOATE 10 MG/1
10 TABLET, ORALLY DISINTEGRATING ORAL
Qty: 16 TAB | Refills: 0 | Status: SHIPPED | OUTPATIENT
Start: 2018-06-13 | End: 2018-08-10 | Stop reason: SDUPTHER

## 2018-06-13 NOTE — TELEPHONE ENCOUNTER
From: Virginia Monge  To: Kristin Spicer MD  Sent: 6/13/2018 1:23 PM EDT  Subject: Medication Renewal Request    Original authorizing provider: MD Virginia Chaidez would like a refill of the following medications:  rizatriptan (MAXALT-MLT) 10 mg disintegrating tablet Kristin Spicer MD]    Preferred pharmacy: [facility]/PHARMACY #[id number] - [place]    Comment:  New insurance will only cover 4 pills per week. I previously got 9-18 per month (depending on when I refilled).  Please refill so i can have some on hand as needed

## 2018-07-25 DIAGNOSIS — F41.8 SITUATIONAL ANXIETY: ICD-10-CM

## 2018-07-25 RX ORDER — CLONAZEPAM 0.5 MG/1
TABLET ORAL
Qty: 10 TAB | Refills: 0 | OUTPATIENT
Start: 2018-07-25 | End: 2018-11-14 | Stop reason: SDUPTHER

## 2018-07-25 RX ORDER — RIZATRIPTAN BENZOATE 10 MG/1
10 TABLET ORAL
Qty: 4 TAB | Refills: 0 | Status: SHIPPED | OUTPATIENT
Start: 2018-07-25 | End: 2018-11-14 | Stop reason: SDUPTHER

## 2018-07-25 NOTE — TELEPHONE ENCOUNTER
Verbal order per Dr. Jeffrey Montana for calling in medications   Requested Prescriptions     Signed Prescriptions Disp Refills    clonazePAM (KLONOPIN) 0.5 mg tablet 10 Tab 0     Sig: Take 1/2 to 1 tab daily ONLY as needed     Authorizing Provider: Washington Whittington    rizatriptan (MAXALT) 10 mg tablet 4 Tab 0     Sig: Take 1 Tab by mouth once as needed for Migraine for up to 1 dose. May repeat in 2 hours if needed     Authorizing Provider: Washington Whittington         Phone in.

## 2018-07-25 NOTE — TELEPHONE ENCOUNTER
From: Meenu Alvarez  To: Bridget Mckeon MD  Sent: 7/25/2018 3:17 PM EDT  Subject: Medication Renewal Request    Original authorizing provider: MD Meenu Garza would like a refill of the following medications:  clonazePAM (KLONOPIN) 0.5 mg tablet Bridget Mckeon MD]    Preferred pharmacy: [facility]/PHARMACY #[id number] [address] [address] [address], [address]    Comment:  Rizatriptin is not an option to refill for some reason. I also need a refill on the 10mg rizatriptin. However, my insurance will only cover 4 pills.

## 2018-08-10 RX ORDER — RIZATRIPTAN BENZOATE 10 MG/1
10 TABLET, ORALLY DISINTEGRATING ORAL
Qty: 4 TAB | Refills: 1 | Status: SHIPPED | OUTPATIENT
Start: 2018-08-10 | End: 2018-10-26 | Stop reason: SDUPTHER

## 2018-10-28 RX ORDER — RIZATRIPTAN BENZOATE 10 MG/1
10 TABLET, ORALLY DISINTEGRATING ORAL
Qty: 4 TAB | Refills: 1 | Status: SHIPPED | OUTPATIENT
Start: 2018-10-28 | End: 2019-02-04 | Stop reason: SDUPTHER

## 2018-11-14 ENCOUNTER — OFFICE VISIT (OUTPATIENT)
Dept: INTERNAL MEDICINE CLINIC | Age: 28
End: 2018-11-14

## 2018-11-14 VITALS
SYSTOLIC BLOOD PRESSURE: 117 MMHG | DIASTOLIC BLOOD PRESSURE: 71 MMHG | WEIGHT: 141.6 LBS | HEART RATE: 80 BPM | RESPIRATION RATE: 12 BRPM | TEMPERATURE: 98.5 F | BODY MASS INDEX: 22.22 KG/M2 | HEIGHT: 67 IN | OXYGEN SATURATION: 98 %

## 2018-11-14 DIAGNOSIS — Z00.00 WELL ADULT EXAM: Primary | ICD-10-CM

## 2018-11-14 DIAGNOSIS — J45.20 MILD INTERMITTENT REACTIVE AIRWAY DISEASE WITHOUT COMPLICATION: ICD-10-CM

## 2018-11-14 DIAGNOSIS — F41.8 SITUATIONAL ANXIETY: ICD-10-CM

## 2018-11-14 DIAGNOSIS — G43.819 OTHER MIGRAINE WITHOUT STATUS MIGRAINOSUS, INTRACTABLE: ICD-10-CM

## 2018-11-14 DIAGNOSIS — E55.9 VITAMIN D DEFICIENCY: ICD-10-CM

## 2018-11-14 RX ORDER — RIZATRIPTAN BENZOATE 10 MG/1
10 TABLET, ORALLY DISINTEGRATING ORAL
Qty: 4 TAB | Refills: 1 | Status: CANCELLED | OUTPATIENT
Start: 2018-11-14 | End: 2018-11-14

## 2018-11-14 RX ORDER — BUSPIRONE HYDROCHLORIDE 5 MG/1
5 TABLET ORAL
Qty: 30 TAB | Refills: 0 | Status: SHIPPED | OUTPATIENT
Start: 2018-11-14 | End: 2018-12-18 | Stop reason: SDUPTHER

## 2018-11-14 RX ORDER — ALBUTEROL SULFATE 90 UG/1
1 AEROSOL, METERED RESPIRATORY (INHALATION)
Qty: 1 INHALER | Refills: 6 | Status: SHIPPED | OUTPATIENT
Start: 2018-11-14 | End: 2018-11-27 | Stop reason: SDUPTHER

## 2018-11-14 RX ORDER — CLONAZEPAM 0.5 MG/1
TABLET ORAL
Qty: 10 TAB | Refills: 0 | Status: SHIPPED | OUTPATIENT
Start: 2018-11-14 | End: 2018-12-19 | Stop reason: ALTCHOICE

## 2018-11-14 RX ORDER — RIZATRIPTAN BENZOATE 10 MG/1
10 TABLET ORAL
Qty: 4 TAB | Refills: 3 | Status: SHIPPED | OUTPATIENT
Start: 2018-11-14 | End: 2018-11-27 | Stop reason: SDUPTHER

## 2018-11-14 NOTE — PROGRESS NOTES
Jose Daniel Irving is a 29 y.o. female and presents with Complete Physical (fasting for labs ) Fanny Meier Pt presents for well woman and to follow up on situational stress. Since last visit patient is still working full-time at [employer]. 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  She is getting the degree through [employer] but is doing her clinical rotations through [facility]. She reports she is doing very well overall. Anxiety Patient reports she is doing very well. She has weaned off her SSRI and uses about 10 clonazepam every 4 months. She has been trained for 1 year with [facility]'s anxiety clinic. She is very familiar and aware of mindfulness, guided imagery and progressive muscle relaxation. She reports she does hot house yoga and that seems to help her as well. GISELLA 7 equals 5=irritiblity and nervousness frequently with situations. Pt can clearly control her nervousness. She notes that she has situations such as being with her intense family and during clinicals that sometimes aggravates her anxiety. She is pretty happy with her behavioral modifications. Followed boyiendiana who got an accounting job in [employer] with boyfriend now 6 years, dating 9 years We discussed doing her GYN. She has a gynecologist but is not sure if she can get annuals every year. She is also interested in doing a nonhormonal contraception. She is concerned it may aggravate her migraines if it is hormonal. 
 
Subjective: 
Migraine Patient reports her headaches and migraines are very well controlled. She is taking magnesium which seems to help her symptoms as well. She is going to go back to her exercise. She is pretty regular with hot house yoga. Review of Systems Constitutional: negative for fevers, chills, anorexia and weight loss Eyes:   negative for visual disturbance and irritation ENT:   negative for tinnitus,sore throat,nasal congestion,ear pains. hoarseness Respiratory:  negative for cough, hemoptysis, dyspnea,wheezing CV:   negative for chest pain, palpitations, lower extremity edema GI:   negative for nausea, vomiting, diarrhea, abdominal pain,melena Endo:               negative for polyuria,polydipsia,polyphagia,heat intolerance Genitourinary: negative for frequency, dysuria and hematuria Integument:  negative for rash and pruritus Hematologic:  negative for easy bruising and gum/nose bleeding Musculoskel: negative for myalgias, arthralgias, back pain, muscle weakness, joint pain Neurological:  negative for headaches, dizziness, vertigo, memory problems and gait Behavl/Psych: negative for feelings of anxiety, depression, mood changes Past Medical History:  
Diagnosis Date  Anxiety   
 prn clonazepam  
 Headache   
 controlled with magnesium and prn maxalt History reviewed. No pertinent surgical history. Social History Socioeconomic History  Marital status: SINGLE Spouse name: Not on file  Number of children: Not on file  Years of education: Not on file  Highest education level: Not on file Social Needs  Financial resource strain: Not on file  Food insecurity - worry: Not on file  Food insecurity - inability: Not on file  Transportation needs - medical: Not on file  Transportation needs - non-medical: Not on file Occupational History  Not on file Tobacco Use  Smoking status: Never Smoker  Smokeless tobacco: Never Used Substance and Sexual Activity  Alcohol use: Yes Comment: occasional weekend  Drug use: No  
 Sexual activity: Yes  
  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   
     prediabetic  Hypertension Mother  Arthritis-osteo Father  Hypertension Father  Cancer Maternal Grandmother   
     lung  Heart Disease Neg Hx   
 Heart Attack Neg Hx Allergies Allergen Reactions  Dilaudid [Hydromorphone] Hives Objective: 
Visit Vitals /71 (BP 1 Location: Left arm, BP Patient Position: Sitting) Pulse 80 Temp 98.5 °F (36.9 °C) (Oral) Resp 12 Ht 5' 7\" (1.702 m) Wt 141 lb 9.6 oz (64.2 kg) LMP 10/15/2018 (Approximate) SpO2 98% BMI 22.18 kg/m² Physical Exam:  
General appearance - alert, well appearing, and in no distress Mental status - alert, oriented to person, place, and time Tearful when discussing anxiety EYE-GEORGE, EOMI,  corneas normal, no foreign bodies, visual acuity normal both eyes, no periorbital cellulitis ENT-ENT exam normal, no neck nodes or sinus tenderness Nose - normal and patent, no erythema, discharge or polyps Mouth - mucous membranes moist, pharynx normal without lesions Neck - supple, no significant adenopathy Chest - clear to auscultation, no wheezes, rales or rhonchi, symmetric air entry Heart - normal rate, regular rhythm, normal S1, S2, no murmurs, rubs, clicks or gallops Abdomen - soft, nontender, nondistended, no masses or organomegaly Lymph- no adenopathy palpable Ext-peripheral pulses normal, no pedal edema, no clubbing or cyanosis Skin-Warm and dry. no hyperpigmentation, vitiligo, or suspicious lesions Neuro -alert, oriented, normal speech, no focal findings or movement disorder noted Results for orders placed or performed in visit on 02/08/18 URINALYSIS W/ RFLX MICROSCOPIC Result Value Ref Range Specific Gravity 1.006 1.005 - 1.030  
 pH (UA) 7.0 5.0 - 7.5 Color Yellow Yellow Appearance Clear Clear Leukocyte Esterase Negative Negative Protein Negative Negative/Trace Glucose Negative Negative Ketone Negative Negative Blood Negative Negative Bilirubin Negative Negative Urobilinogen 0.2 0.2 - 1.0 mg/dL Nitrites Negative Negative Microscopic Examination Comment CBC W/O DIFF Result Value Ref Range WBC 5.0 3.4 - 10.8 x10E3/uL  
 RBC 4.41 3.77 - 5.28 x10E6/uL HGB 13.1 11.1 - 15.9 g/dL HCT 39.3 34.0 - 46.6 % MCV 89 79 - 97 fL  
 MCH 29.7 26.6 - 33.0 pg  
 MCHC 33.3 31.5 - 35.7 g/dL  
 RDW 12.9 12.3 - 15.4 % PLATELET 772 892 - 554 x10E3/uL METABOLIC PANEL, COMPREHENSIVE Result Value Ref Range Glucose 87 65 - 99 mg/dL BUN 12 6 - 20 mg/dL Creatinine 0.60 0.57 - 1.00 mg/dL GFR est non- >59 mL/min/1.73 GFR est  >59 mL/min/1.73  
 BUN/Creatinine ratio 20 9 - 23 Sodium 139 134 - 144 mmol/L Potassium 3.8 3.5 - 5.2 mmol/L Chloride 98 96 - 106 mmol/L  
 CO2 25 18 - 29 mmol/L Calcium 9.5 8.7 - 10.2 mg/dL Protein, total 7.8 6.0 - 8.5 g/dL Albumin 4.5 3.5 - 5.5 g/dL GLOBULIN, TOTAL 3.3 1.5 - 4.5 g/dL A-G Ratio 1.4 1.2 - 2.2 Bilirubin, total 0.4 0.0 - 1.2 mg/dL Alk. phosphatase 41 39 - 117 IU/L  
 AST (SGOT) 18 0 - 40 IU/L  
 ALT (SGPT) 16 0 - 32 IU/L  
TSH 3RD GENERATION Result Value Ref Range TSH 3.270 0.450 - 4.500 uIU/mL VITAMIN D, 25 HYDROXY Result Value Ref Range VITAMIN D, 25-HYDROXY 36.4 30.0 - 100.0 ng/mL HEMOGLOBIN A1C WITH EAG Result Value Ref Range Hemoglobin A1c 4.8 4.8 - 5.6 % Estimated average glucose 91 mg/dL GELACIO W/REFLEX Result Value Ref Range Antinuclear Antibodies Direct Negative Negative SED RATE (ESR) Result Value Ref Range Sed rate (ESR) 2 0 - 32 mm/hr Prevention Cardiovascular profile Family hx Exercising:  Hot house yoga, walk /running Blood pressure: 
Health healthy diet: 
Diabetes: 
Cholesterol: 
Renal function: 
 
 
Cancer risk profile Mammogram 
Lung Colonoscopy Skin nonhealing in 2 weeks--mole flat and looks like may be changing dermatology in March Gyn abnormal bleeding/discharge/abd pain/pressure- [facility] 2016 Discussion with gyn re: birth control, not on bcp. She is using condoms with boyfriend 
nuva ring Thyroid sx Weight stable No sx Osteopenia prevention Calcium 1000mg/day yes Vitamin D 800iu/day yes Mental health scale:  
Took clonazepma when coming off zoloft Depression Anxiety Sleep # of hours: 
Energy Level:     
 
Immunizations 
uptodate through work TDAP 10/14 Pneumonia vaccine Flu vaccine 10/14 PPD negative HPV- completed gardisil vaccine Diagnoses and all orders for this visit: 
 
1. Well adult exam 
Patient appears in overall good health. She is a good place where she is finishing her nursing degree and working full-time for [employer] -     LIPID PANEL 
-     METABOLIC PANEL, COMPREHENSIVE 
-     TSH 3RD GENERATION 
-     VITAMIN D, 25 HYDROXY 2. Situational anxiety Nicely controlled with her behavioral modifications that she learned from [facility]'s anxiety clinic 
-     clonazePAM (KLONOPIN) 0.5 mg tablet; Take 1/2 to 1 tab daily ONLY as needed 
-     busPIRone (BUSPAR) 5 mg tablet; Take 1 Tab by mouth daily as needed. -     TSH 3RD GENERATION 3. Other migraine without status migrainosus, intractable This is quite stable. She will continue on magnesium per protocol prophylaxis 
-     magnesium gluconate 500 mg (27 mg  elemental) tablet; Take 1 Tab by mouth daily. -     rizatriptan (MAXALT) 10 mg tablet; Take 1 Tab by mouth once as needed for Migraine for up to 1 dose. May repeat in 2 hours if needed 4. Vitamin D deficiency We will recheck and replete as needed given underlying history of some anxiety and family history of anxiety as well -     VITAMIN D, 25 HYDROXY 5. Mild intermittent reactive airway disease without complication Patient will use as needed for cold weather 
-     albuterol (PROVENTIL HFA, VENTOLIN HFA, PROAIR HFA) 90 mcg/actuation inhaler; Take 1 Puff by inhalation every four (4) hours as needed for Wheezing. Follow-up in 1 year or earlier if needed. Her boyfriend comes to see me as well his name is Olena Caban. This note will not be viewable in [address]

## 2018-11-14 NOTE — PATIENT INSTRUCTIONS

## 2018-11-27 DIAGNOSIS — G43.819 OTHER MIGRAINE WITHOUT STATUS MIGRAINOSUS, INTRACTABLE: ICD-10-CM

## 2018-11-27 DIAGNOSIS — J45.20 MILD INTERMITTENT REACTIVE AIRWAY DISEASE WITHOUT COMPLICATION: ICD-10-CM

## 2018-11-28 RX ORDER — RIZATRIPTAN BENZOATE 10 MG/1
10 TABLET ORAL
Qty: 4 TAB | Refills: 3 | Status: SHIPPED | OUTPATIENT
Start: 2018-11-28 | End: 2019-07-26 | Stop reason: SDUPTHER

## 2018-11-28 RX ORDER — ALBUTEROL SULFATE 90 UG/1
1 AEROSOL, METERED RESPIRATORY (INHALATION)
Qty: 1 INHALER | Refills: 6 | Status: SHIPPED | OUTPATIENT
Start: 2018-11-28 | End: 2019-10-01 | Stop reason: SDUPTHER

## 2018-12-18 DIAGNOSIS — F41.8 SITUATIONAL ANXIETY: ICD-10-CM

## 2018-12-18 RX ORDER — BUSPIRONE HYDROCHLORIDE 5 MG/1
TABLET ORAL
Qty: 30 TAB | Refills: 0 | Status: SHIPPED | OUTPATIENT
Start: 2018-12-18 | End: 2018-12-19 | Stop reason: ALTCHOICE

## 2018-12-19 DIAGNOSIS — F41.8 SITUATIONAL ANXIETY: ICD-10-CM

## 2018-12-19 RX ORDER — BUSPIRONE HYDROCHLORIDE 5 MG/1
TABLET ORAL
Qty: 30 TAB | Refills: 0 | Status: SHIPPED | OUTPATIENT
Start: 2018-12-19 | End: 2019-11-07 | Stop reason: ALTCHOICE

## 2019-02-04 DIAGNOSIS — F41.8 SITUATIONAL ANXIETY: ICD-10-CM

## 2019-02-04 RX ORDER — CLONAZEPAM 0.5 MG/1
TABLET ORAL
Qty: 10 TAB | Refills: 0 | OUTPATIENT
Start: 2019-02-04 | End: 2019-03-29 | Stop reason: SDUPTHER

## 2019-02-05 ENCOUNTER — TELEPHONE (OUTPATIENT)
Dept: INTERNAL MEDICINE CLINIC | Age: 29
End: 2019-02-05

## 2019-02-05 RX ORDER — RIZATRIPTAN BENZOATE 10 MG/1
10 TABLET, ORALLY DISINTEGRATING ORAL
Qty: 4 TAB | Refills: 1 | Status: SHIPPED | OUTPATIENT
Start: 2019-02-05 | End: 2019-02-13 | Stop reason: SDUPTHER

## 2019-02-14 RX ORDER — RIZATRIPTAN BENZOATE 10 MG/1
10 TABLET, ORALLY DISINTEGRATING ORAL
Qty: 4 TAB | Refills: 1 | Status: SHIPPED | OUTPATIENT
Start: 2019-02-14 | End: 2019-03-22 | Stop reason: SDUPTHER

## 2019-02-15 ENCOUNTER — PATIENT MESSAGE (OUTPATIENT)
Dept: INTERNAL MEDICINE CLINIC | Age: 29
End: 2019-02-15

## 2019-03-07 DIAGNOSIS — G43.819 OTHER MIGRAINE WITHOUT STATUS MIGRAINOSUS, INTRACTABLE: Primary | ICD-10-CM

## 2019-03-29 DIAGNOSIS — F41.8 SITUATIONAL ANXIETY: ICD-10-CM

## 2019-04-01 RX ORDER — CLONAZEPAM 0.5 MG/1
TABLET ORAL
Qty: 10 TAB | Refills: 0 | Status: SHIPPED | OUTPATIENT
Start: 2019-04-01 | End: 2019-04-22 | Stop reason: SDUPTHER

## 2019-04-22 ENCOUNTER — OFFICE VISIT (OUTPATIENT)
Dept: INTERNAL MEDICINE CLINIC | Age: 29
End: 2019-04-22

## 2019-04-22 ENCOUNTER — TELEPHONE (OUTPATIENT)
Dept: INTERNAL MEDICINE CLINIC | Age: 29
End: 2019-04-22

## 2019-04-22 VITALS
BODY MASS INDEX: 21.6 KG/M2 | HEART RATE: 88 BPM | TEMPERATURE: 99 F | WEIGHT: 137.6 LBS | SYSTOLIC BLOOD PRESSURE: 119 MMHG | RESPIRATION RATE: 14 BRPM | OXYGEN SATURATION: 99 % | DIASTOLIC BLOOD PRESSURE: 65 MMHG | HEIGHT: 67 IN

## 2019-04-22 DIAGNOSIS — F90.2 ATTENTION DEFICIT HYPERACTIVITY DISORDER (ADHD), COMBINED TYPE: Primary | ICD-10-CM

## 2019-04-22 DIAGNOSIS — F41.8 SITUATIONAL ANXIETY: ICD-10-CM

## 2019-04-22 RX ORDER — DEXTROAMPHETAMINE SACCHARATE, AMPHETAMINE ASPARTATE, DEXTROAMPHETAMINE SULFATE AND AMPHETAMINE SULFATE 1.25; 1.25; 1.25; 1.25 MG/1; MG/1; MG/1; MG/1
TABLET ORAL
Qty: 42 TAB | Refills: 0 | Status: SHIPPED | OUTPATIENT
Start: 2019-04-22 | End: 2019-04-28

## 2019-04-22 RX ORDER — CLONAZEPAM 0.5 MG/1
TABLET ORAL
Qty: 10 TAB | Refills: 0 | Status: SHIPPED | OUTPATIENT
Start: 2019-04-22 | End: 2019-06-07 | Stop reason: SDUPTHER

## 2019-04-22 NOTE — PROGRESS NOTES
David Hernandes is a 34 y.o. female and presents with Follow-up (from visit with [facility] ) Leonel Babb Patient presents to follow-up from her visit with the ADHD clinic. Patient was seen by Dr. Genaro Lovell at [facility]. 
 
 
ADHD Patient was assessed by Dr. Shirley Jerome testing was consistent with attention deficit disorder. Patient reports that in elementary school in middle school she had B's and C's. In high school she got bad grades. She went to a [employer] school and was punished for her bad grades. She reports that when she went to college she failed out and had to come home. Patient reports she does not like to talk about it as it was a very difficult time for her. She was working at [employer] and her boss actually told her to possibly get further evaluated for attention deficit. She reports she was requiring more clonazepam.  Her boyfriend sees that she has ADHD. She reports she is having problems at work as well as school and also in her relationship. Anxiety Patient reports she has been on Zoloft in the past and was able to tolerate. She was on Wellbutrin but it caused her to have side effects. She notes that she was on BuSpar but it did not help at all. Her GISELLA 7 score today is 10 which is consistent with mild to moderate anxiety. She is sleeping at night. Subjective: 
Migraine Patient reports her headaches and migraines are very well controlled. She is taking magnesium which seems to help her symptoms as well. Review of Systems Constitutional: negative for fevers, chills, anorexia and weight loss Eyes:   negative for visual disturbance and irritation ENT:   negative for tinnitus,sore throat,nasal congestion,ear pains. hoarseness Respiratory:  negative for cough, hemoptysis, dyspnea,wheezing CV:   negative for chest pain, palpitations, lower extremity edema GI:   negative for nausea, vomiting, diarrhea, abdominal pain,melena Endo:               negative for polyuria,polydipsia,polyphagia,heat intolerance Genitourinary: negative for frequency, dysuria and hematuria Integument:  negative for rash and pruritus Hematologic:  negative for easy bruising and gum/nose bleeding Musculoskel: negative for myalgias, arthralgias, back pain, muscle weakness, joint pain Neurological:  negative for headaches, dizziness, vertigo, memory problems and gait Behavl/Psych: negative for feelings of anxiety, depression, mood changes Past Medical History:  
Diagnosis Date  Anxiety   
 prn clonazepam  
 Headache   
 controlled with magnesium and prn maxalt History reviewed. No pertinent surgical history. Social History Socioeconomic History  Marital status: SINGLE Spouse name: Not on file  Number of children: Not on file  Years of education: Not on file  Highest education level: Not on file Tobacco Use  Smoking status: Never Smoker  Smokeless tobacco: Never Used Substance and Sexual Activity  Alcohol use: Yes Comment: occasional weekend  Drug use: No  
 Sexual activity: Yes  
  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   
     prediabetic  Hypertension Mother  Arthritis-osteo Father  Hypertension Father  Cancer Maternal Grandmother   
     lung  Heart Disease Neg Hx   
 Heart Attack Neg Hx Allergies Allergen Reactions  Dilaudid [Hydromorphone] Hives Objective: 
Visit Vitals /65 (BP 1 Location: Left arm, BP Patient Position: Sitting) Pulse 88 Temp 99 °F (37.2 °C) (Oral) Resp 14 Ht 5' 7\" (1.702 m) Wt 137 lb 9.6 oz (62.4 kg) LMP 04/22/2019 (Exact Date) SpO2 99% BMI 21.55 kg/m² Physical Exam:  
General appearance - alert, well appearing, and in no distress Mental status - alert, oriented to person, place, and time Tearful when discussing anxiety EYE-GEORGE, EOMI,  corneas normal, no foreign bodies, visual acuity normal both eyes, no periorbital cellulitis ENT-ENT exam normal, no neck nodes or sinus tenderness Nose - normal and patent, no erythema, discharge or polyps Mouth - mucous membranes moist, pharynx normal without lesions Neck - supple, no significant adenopathy Chest - clear to auscultation, no wheezes, rales or rhonchi, symmetric air entry Heart - normal rate, regular rhythm, normal S1, S2, no murmurs, rubs, clicks or gallops Abdomen - soft, nontender, nondistended, no masses or organomegaly Lymph- no adenopathy palpable Ext-peripheral pulses normal, no pedal edema, no clubbing or cyanosis Skin-Warm and dry. no hyperpigmentation, vitiligo, or suspicious lesions Neuro -alert, oriented, normal speech, no focal findings or movement disorder noted Results for orders placed or performed in visit on 02/08/18 URINALYSIS W/ RFLX MICROSCOPIC Result Value Ref Range Specific Gravity 1.006 1.005 - 1.030  
 pH (UA) 7.0 5.0 - 7.5 Color Yellow Yellow Appearance Clear Clear Leukocyte Esterase Negative Negative Protein Negative Negative/Trace Glucose Negative Negative Ketone Negative Negative Blood Negative Negative Bilirubin Negative Negative Urobilinogen 0.2 0.2 - 1.0 mg/dL Nitrites Negative Negative Microscopic Examination Comment CBC W/O DIFF Result Value Ref Range WBC 5.0 3.4 - 10.8 x10E3/uL  
 RBC 4.41 3.77 - 5.28 x10E6/uL HGB 13.1 11.1 - 15.9 g/dL HCT 39.3 34.0 - 46.6 % MCV 89 79 - 97 fL  
 MCH 29.7 26.6 - 33.0 pg  
 MCHC 33.3 31.5 - 35.7 g/dL  
 RDW 12.9 12.3 - 15.4 % PLATELET 066 084 - 875 x10E3/uL METABOLIC PANEL, COMPREHENSIVE  
 Result Value Ref Range Glucose 87 65 - 99 mg/dL BUN 12 6 - 20 mg/dL Creatinine 0.60 0.57 - 1.00 mg/dL GFR est non- >59 mL/min/1.73 GFR est  >59 mL/min/1.73  
 BUN/Creatinine ratio 20 9 - 23 Sodium 139 134 - 144 mmol/L Potassium 3.8 3.5 - 5.2 mmol/L Chloride 98 96 - 106 mmol/L  
 CO2 25 18 - 29 mmol/L Calcium 9.5 8.7 - 10.2 mg/dL Protein, total 7.8 6.0 - 8.5 g/dL Albumin 4.5 3.5 - 5.5 g/dL GLOBULIN, TOTAL 3.3 1.5 - 4.5 g/dL A-G Ratio 1.4 1.2 - 2.2 Bilirubin, total 0.4 0.0 - 1.2 mg/dL Alk. phosphatase 41 39 - 117 IU/L  
 AST (SGOT) 18 0 - 40 IU/L  
 ALT (SGPT) 16 0 - 32 IU/L  
TSH 3RD GENERATION Result Value Ref Range TSH 3.270 0.450 - 4.500 uIU/mL VITAMIN D, 25 HYDROXY Result Value Ref Range VITAMIN D, 25-HYDROXY 36.4 30.0 - 100.0 ng/mL HEMOGLOBIN A1C WITH EAG Result Value Ref Range Hemoglobin A1c 4.8 4.8 - 5.6 % Estimated average glucose 91 mg/dL GELACIO W/REFLEX Result Value Ref Range Antinuclear Antibodies Direct Negative Negative SED RATE (ESR) Result Value Ref Range Sed rate (ESR) 2 0 - 32 mm/hr Prevention Cardiovascular profile Family hx Exercising:  Hot house yoga, walk /running Blood pressure: 
Health healthy diet: 
Diabetes: 
Cholesterol: 
Renal function: 
 
 
Cancer risk profile Mammogram 
Lung Colonoscopy Skin nonhealing in 2 weeks--mole flat and looks like may be changing dermatology in March Gyn abnormal bleeding/discharge/abd pain/pressure- [facility] 2016 Discussion with gyn re: birth control, not on bcp. She is using condoms with boyfriend 
nuva ring Thyroid sx Weight stable No sx Osteopenia prevention Calcium 1000mg/day yes Vitamin D 800iu/day yes Mental health scale:  
Took clonazepma when coming off zoloft Depression Anxiety Sleep # of hours: 
Energy Level:     
 
Immunizations 
uptodate through work TDAP 10/14 Pneumonia vaccine Flu vaccine 10/14 PPD negative HPV- completed gardisil vaccine Diagnoses and all orders for this visit: 1. Attention deficit hyperactivity disorder (ADHD), combined type Patient has a history and testing consistent with ADHD. As noted above she has struggled in school in elementary and middle school. She also had trouble in high school and had to come home from college due to academic problems. 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 She was prompted to see an attention deficit specialist.  Aside from testing from Dr. Neha Velasco at 79 patient is screened for adult ADHD self-report scale symptom checklist she was 6+ in part a. Her history and testing is consistent with attention deficit. Her attention deficit may be causing her anxiety as well. Patient prefers not to be on both Adderall and clonazepam she therefore withdraws from taking clonazepam.  She returns the prescriptions to my nurse and they were shredded. If her symptoms are not getting better we may need to increase her Adderall. She is very conscientious of increasing the dose. -     dextroamphetamine-amphetamine (ADDERALL) 5 mg tablet; Take 1 tablet po in the am and 1 po at lunchtime. Indications: Attention Deficit Disorder with Hyperactivity 2. Situational anxiety Prescription written but she defers. She does not want to be on Adderall and clonazepam even if clonazepam is as needed. She may benefit from BuSpar if needed for anxiety as she is screening as mild to moderate today on her screening test. 
-     clonazePAM (KLONOPIN) 0.5 mg tablet; Take 1/2 to 1 tab daily ONLY as needed Follow-up in 3 weeks or earlier if needed.

## 2019-04-22 NOTE — TELEPHONE ENCOUNTER
Pt stopped by the office to  a rx for adderrall but did not need the rx for klonopin. She stated it was given to her at today's visit. I placed the rx in the blue folder at the .     Thanks

## 2019-04-24 ENCOUNTER — DOCUMENTATION ONLY (OUTPATIENT)
Dept: INTERNAL MEDICINE CLINIC | Age: 29
End: 2019-04-24

## 2019-04-24 RX ORDER — RIZATRIPTAN BENZOATE 10 MG/1
10 TABLET, ORALLY DISINTEGRATING ORAL
Qty: 4 TAB | Refills: 1 | Status: SHIPPED | OUTPATIENT
Start: 2019-04-24 | End: 2019-05-31 | Stop reason: SDUPTHER

## 2019-04-28 DIAGNOSIS — F90.2 ATTENTION DEFICIT HYPERACTIVITY DISORDER (ADHD), COMBINED TYPE: ICD-10-CM

## 2019-04-28 RX ORDER — DEXTROAMPHETAMINE SACCHARATE, AMPHETAMINE ASPARTATE, DEXTROAMPHETAMINE SULFATE AND AMPHETAMINE SULFATE 1.25; 1.25; 1.25; 1.25 MG/1; MG/1; MG/1; MG/1
TABLET ORAL
Qty: 4 TAB | Refills: 0 | Status: SHIPPED | OUTPATIENT
Start: 2019-04-28 | End: 2019-05-15

## 2019-05-10 ENCOUNTER — DOCUMENTATION ONLY (OUTPATIENT)
Dept: INTERNAL MEDICINE CLINIC | Age: 29
End: 2019-05-10

## 2019-05-15 ENCOUNTER — OFFICE VISIT (OUTPATIENT)
Dept: INTERNAL MEDICINE CLINIC | Age: 29
End: 2019-05-15

## 2019-05-15 VITALS
HEIGHT: 67 IN | RESPIRATION RATE: 18 BRPM | HEART RATE: 90 BPM | SYSTOLIC BLOOD PRESSURE: 101 MMHG | OXYGEN SATURATION: 98 % | BODY MASS INDEX: 21.09 KG/M2 | WEIGHT: 134.38 LBS | TEMPERATURE: 99.3 F | DIASTOLIC BLOOD PRESSURE: 64 MMHG

## 2019-05-15 DIAGNOSIS — R68.84 JAW PAIN, NON-TMJ: Primary | ICD-10-CM

## 2019-05-15 DIAGNOSIS — F41.9 ANXIETY: ICD-10-CM

## 2019-05-15 DIAGNOSIS — F90.2 ATTENTION DEFICIT HYPERACTIVITY DISORDER (ADHD), COMBINED TYPE: ICD-10-CM

## 2019-05-15 RX ORDER — DEXTROAMPHETAMINE SACCHARATE, AMPHETAMINE ASPARTATE, DEXTROAMPHETAMINE SULFATE AND AMPHETAMINE SULFATE 2.5; 2.5; 2.5; 2.5 MG/1; MG/1; MG/1; MG/1
TABLET ORAL
Qty: 64 TAB | Refills: 0 | Status: SHIPPED | OUTPATIENT
Start: 2019-05-15 | End: 2019-05-15 | Stop reason: SDUPTHER

## 2019-05-15 RX ORDER — IBUPROFEN 600 MG/1
TABLET ORAL
COMMUNITY

## 2019-05-15 RX ORDER — AMOXICILLIN AND CLAVULANATE POTASSIUM 875; 125 MG/1; MG/1
1 TABLET, FILM COATED ORAL 2 TIMES DAILY
Qty: 20 TAB | Refills: 0 | Status: SHIPPED | OUTPATIENT
Start: 2019-05-15 | End: 2019-11-18 | Stop reason: SDUPTHER

## 2019-05-15 RX ORDER — DEXTROAMPHETAMINE SACCHARATE, AMPHETAMINE ASPARTATE, DEXTROAMPHETAMINE SULFATE AND AMPHETAMINE SULFATE 2.5; 2.5; 2.5; 2.5 MG/1; MG/1; MG/1; MG/1
TABLET ORAL
Qty: 64 TAB | Refills: 0 | Status: SHIPPED | OUTPATIENT
Start: 2019-06-15 | End: 2019-05-16

## 2019-05-15 NOTE — PROGRESS NOTES
Shiloh Nguyen is a 34 y.o. female and presents with Behavioral Problem (F/u Adderral not as effective) Karma Scalesn Patient is here to follow-up on her attention deficit medication and anxiety. ADHD Patient presents for follow-up on her Adderall prescription and presents her documentation of grades from college. Documentation supports that patient had failed subjects or withdrew from subjects and then on repeat took them in past.  Patient also has documentation of college where she was dismissed due to poor performance. She reports her failures are still difficult to discuss but is aware of documentation needed to support her history consistent with AD D. Patient is on Adderall 5 mg in the morning and 5 mg at lunchtime. She notes that when she initially took the medication she had some initial palpitations and shakiness but that resolved. She notes that the medication initially improved her ability to study and focus but then notes that it seemed to wane after 1 to 2 weeks. 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 Anxiety Since she has been on the Adderall she reports her anxiety is much better. She was concerned that she might not be able to sleep but is now sleeping very well. Subjective: 
Migraine Patient reports her headaches and migraines are very well controlled. She is taking magnesium which seems to help her symptoms as well. She reports she still does not have any migraine issues with Adderall. Jaw pain Jaw pain Patient reports she has been experiencing left lower jaw pain and tooth pain for the past week. She has been taking 600 mg ibuprofen for the pain. She was told that she needs to have her wisdom teeth taken out. She has an appointment with the dentist this week. Review of Systems Constitutional: negative for fevers, chills, anorexia and weight loss Eyes:   negative for visual disturbance and irritation ENT:   negative for tinnitus,sore throat,nasal congestion,ear pains. hoarseness Respiratory:  negative for cough, hemoptysis, dyspnea,wheezing CV:   negative for chest pain, palpitations, lower extremity edema GI:   negative for nausea, vomiting, diarrhea, abdominal pain,melena Endo:               negative for polyuria,polydipsia,polyphagia,heat intolerance Genitourinary: negative for frequency, dysuria and hematuria Integument:  negative for rash and pruritus Hematologic:  negative for easy bruising and gum/nose bleeding Musculoskel: negative for myalgias, arthralgias, back pain, muscle weakness, joint pain Neurological:  negative for headaches, dizziness, vertigo, memory problems and gait Behavl/Psych: negative for feelings of anxiety, depression, mood changes Past Medical History:  
Diagnosis Date  Anxiety   
 prn clonazepam  
 Headache   
 controlled with magnesium and prn maxalt No past surgical history on file. Social History Socioeconomic History  Marital status: SINGLE Spouse name: Not on file  Number of children: Not on file  Years of education: Not on file  Highest education level: Not on file Tobacco Use  Smoking status: Never Smoker  Smokeless tobacco: Never Used Substance and Sexual Activity  Alcohol use: Yes Comment: occasional weekend  Drug use: No  
 Sexual activity: Yes  
  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   
     prediabetic  Hypertension Mother  Arthritis-osteo Father  Hypertension Father  Cancer Maternal Grandmother   
     lung  Heart Disease Neg Hx   
 Heart Attack Neg Hx Allergies Allergen Reactions  Dilaudid [Hydromorphone] Hives Objective: 
Visit Vitals /64 (BP 1 Location: Left arm, BP Patient Position: Sitting) Pulse 90 Temp 99.3 °F (37.4 °C) (Oral) Resp 18 Ht 5' 7\" (1.702 m) Wt 134 lb 6 oz (61 kg) LMP 04/22/2019 (Exact Date) SpO2 98% BMI 21.05 kg/m² Physical Exam:  
General appearance - alert, well appearing, and in no distress Mental status - alert, oriented to person, place, and time EYE-GEORGE, EOMI,  corneas normal, no foreign bodies, visual acuity normal both eyes, no periorbital cellulitis ENT-ENT exam normal, no neck nodes or sinus tenderness Nose - normal and patent, no erythema, discharge or polyps Mouth - mucous membranes moist, pharynx normal without lesions, left lower wisdom tooth pain on palpation, white dentate line noted on bucca mucosa Neck - supple, no significant adenopathy Chest - clear to auscultation, no wheezes, rales or rhonchi, symmetric air entry Heart - normal rate, regular rhythm, normal S1, S2, no murmurs, rubs, clicks or gallops Abdomen - soft, nontender, nondistended, no masses or organomegaly Lymph- no adenopathy palpable Ext-peripheral pulses normal, no pedal edema, no clubbing or cyanosis Skin-Warm and dry. no hyperpigmentation, vitiligo, or suspicious lesions Neuro -alert, oriented, normal speech, no focal findings or movement disorder noted Results for orders placed or performed in visit on 02/08/18 URINALYSIS W/ RFLX MICROSCOPIC Result Value Ref Range Specific Gravity 1.006 1.005 - 1.030  
 pH (UA) 7.0 5.0 - 7.5 Color Yellow Yellow Appearance Clear Clear Leukocyte Esterase Negative Negative Protein Negative Negative/Trace Glucose Negative Negative Ketone Negative Negative Blood Negative Negative Bilirubin Negative Negative Urobilinogen 0.2 0.2 - 1.0 mg/dL Nitrites Negative Negative Microscopic Examination Comment CBC W/O DIFF  
 Result Value Ref Range WBC 5.0 3.4 - 10.8 x10E3/uL  
 RBC 4.41 3.77 - 5.28 x10E6/uL HGB 13.1 11.1 - 15.9 g/dL HCT 39.3 34.0 - 46.6 % MCV 89 79 - 97 fL  
 MCH 29.7 26.6 - 33.0 pg  
 MCHC 33.3 31.5 - 35.7 g/dL  
 RDW 12.9 12.3 - 15.4 % PLATELET 929 561 - 262 x10E3/uL METABOLIC PANEL, COMPREHENSIVE Result Value Ref Range Glucose 87 65 - 99 mg/dL BUN 12 6 - 20 mg/dL Creatinine 0.60 0.57 - 1.00 mg/dL GFR est non- >59 mL/min/1.73 GFR est  >59 mL/min/1.73  
 BUN/Creatinine ratio 20 9 - 23 Sodium 139 134 - 144 mmol/L Potassium 3.8 3.5 - 5.2 mmol/L Chloride 98 96 - 106 mmol/L  
 CO2 25 18 - 29 mmol/L Calcium 9.5 8.7 - 10.2 mg/dL Protein, total 7.8 6.0 - 8.5 g/dL Albumin 4.5 3.5 - 5.5 g/dL GLOBULIN, TOTAL 3.3 1.5 - 4.5 g/dL A-G Ratio 1.4 1.2 - 2.2 Bilirubin, total 0.4 0.0 - 1.2 mg/dL Alk. phosphatase 41 39 - 117 IU/L  
 AST (SGOT) 18 0 - 40 IU/L  
 ALT (SGPT) 16 0 - 32 IU/L  
TSH 3RD GENERATION Result Value Ref Range TSH 3.270 0.450 - 4.500 uIU/mL VITAMIN D, 25 HYDROXY Result Value Ref Range VITAMIN D, 25-HYDROXY 36.4 30.0 - 100.0 ng/mL HEMOGLOBIN A1C WITH EAG Result Value Ref Range Hemoglobin A1c 4.8 4.8 - 5.6 % Estimated average glucose 91 mg/dL GELACIO W/REFLEX Result Value Ref Range Antinuclear Antibodies Direct Negative Negative SED RATE (ESR) Result Value Ref Range Sed rate (ESR) 2 0 - 32 mm/hr Prevention Cardiovascular profile Family hx Exercising:  Hot house yoga, walk /running Blood pressure: 
Health healthy diet: 
Diabetes: 
Cholesterol: 
Renal function: 
 
 
Cancer risk profile Mammogram 
Lung Colonoscopy Skin nonhealing in 2 weeks--mole flat and looks like may be changing dermatology in March Gyn abnormal bleeding/discharge/abd pain/pressure- [facility] Discussion with gyn re: birth control, not on bcp. She is using condoms with boyfriend 
nuva ring Thyroid sx Weight stable No sx Osteopenia prevention Calcium 1000mg/day yes Vitamin D 800iu/day yes Mental health scale:  
Took clonazepma when coming off zoloft Depression Anxiety Sleep # of hours: 
Energy Level:     
 
Immunizations 
uptodate through work TDAP 10/14 Pneumonia vaccine Flu vaccine 10/14 PPD negative HPV- completed gardisil vaccine Diagnoses and all orders for this visit: 
 
1. Jaw pain, non-TMJ Patient with pain on palpation of left lower wisdom tooth. If patient is grinding teeth which she is she may be causing further impact on the wisdom tooth. She will need to follow-up with her dentist. 
Patient can take Augmentin if cannot be seen by her dentist this week. 2. Attention deficit hyperactivity disorder (ADHD), combined type Not optimally controlled Will adjust medicine I discussed with patient that we may need to transition to a long-acting Adderall and if she needs to take evening classes may need to use a short acting dose in the evening such that it does not affect her sleep. 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 
-     dextroamphetamine-amphetamine (ADDERALL) 10 mg tablet; Take 1 tablet po in the am and 1 po at lunchtime may take an additional tab on mon at 4 pm  Indications: Attention Deficit Disorder with Hyperactivity 3. Anxiety Improved with treatment of attention deficit Other orders 
-     amoxicillin-clavulanate (AUGMENTIN) 875-125 mg per tablet; Take 1 Tab by mouth two (2) times a day. Follow-up in 2 months or earlier if needed for medication adjustment of her Adderall.

## 2019-05-16 DIAGNOSIS — F90.2 ATTENTION DEFICIT HYPERACTIVITY DISORDER (ADHD), COMBINED TYPE: ICD-10-CM

## 2019-05-16 RX ORDER — DEXTROAMPHETAMINE SACCHARATE, AMPHETAMINE ASPARTATE, DEXTROAMPHETAMINE SULFATE AND AMPHETAMINE SULFATE 1.875; 1.875; 1.875; 1.875 MG/1; MG/1; MG/1; MG/1
TABLET ORAL
Qty: 60 TAB | Refills: 0 | Status: SHIPPED | OUTPATIENT
Start: 2019-06-15 | End: 2019-05-16 | Stop reason: SDUPTHER

## 2019-05-16 RX ORDER — DEXTROAMPHETAMINE SACCHARATE, AMPHETAMINE ASPARTATE, DEXTROAMPHETAMINE SULFATE AND AMPHETAMINE SULFATE 1.875; 1.875; 1.875; 1.875 MG/1; MG/1; MG/1; MG/1
TABLET ORAL
Qty: 60 TAB | Refills: 0 | Status: SHIPPED | OUTPATIENT
Start: 2019-05-15 | End: 2019-05-21

## 2019-05-21 DIAGNOSIS — F90.2 ATTENTION DEFICIT HYPERACTIVITY DISORDER (ADHD), COMBINED TYPE: ICD-10-CM

## 2019-05-21 RX ORDER — DEXTROAMPHETAMINE SACCHARATE, AMPHETAMINE ASPARTATE, DEXTROAMPHETAMINE SULFATE AND AMPHETAMINE SULFATE 1.25; 1.25; 1.25; 1.25 MG/1; MG/1; MG/1; MG/1
TABLET ORAL
Qty: 64 TAB | Refills: 0 | Status: SHIPPED | OUTPATIENT
Start: 2019-05-21 | End: 2019-06-13 | Stop reason: SDUPTHER

## 2019-05-24 ENCOUNTER — DOCUMENTATION ONLY (OUTPATIENT)
Dept: INTERNAL MEDICINE CLINIC | Age: 29
End: 2019-05-24

## 2019-05-31 RX ORDER — RIZATRIPTAN BENZOATE 10 MG/1
10 TABLET, ORALLY DISINTEGRATING ORAL
Qty: 4 TAB | Refills: 1 | Status: SHIPPED | OUTPATIENT
Start: 2019-05-31 | End: 2019-06-18 | Stop reason: SDUPTHER

## 2019-06-11 ENCOUNTER — PATIENT MESSAGE (OUTPATIENT)
Dept: INTERNAL MEDICINE CLINIC | Age: 29
End: 2019-06-11

## 2019-06-11 DIAGNOSIS — F41.8 SITUATIONAL ANXIETY: ICD-10-CM

## 2019-06-11 DIAGNOSIS — F90.2 ATTENTION DEFICIT HYPERACTIVITY DISORDER (ADHD), COMBINED TYPE: ICD-10-CM

## 2019-06-11 RX ORDER — CLONAZEPAM 0.5 MG/1
TABLET ORAL
Qty: 8 TAB | Refills: 0 | Status: SHIPPED | OUTPATIENT
Start: 2019-06-11 | End: 2019-07-16 | Stop reason: SDUPTHER

## 2019-06-12 ENCOUNTER — TELEPHONE (OUTPATIENT)
Dept: INTERNAL MEDICINE CLINIC | Age: 29
End: 2019-06-12

## 2019-06-13 RX ORDER — DEXTROAMPHETAMINE SACCHARATE, AMPHETAMINE ASPARTATE, DEXTROAMPHETAMINE SULFATE AND AMPHETAMINE SULFATE 1.25; 1.25; 1.25; 1.25 MG/1; MG/1; MG/1; MG/1
TABLET ORAL
Qty: 34 TAB | Refills: 0 | Status: SHIPPED | OUTPATIENT
Start: 2019-06-13 | End: 2019-07-16 | Stop reason: SDUPTHER

## 2019-06-13 RX ORDER — DEXTROAMPHETAMINE SACCHARATE, AMPHETAMINE ASPARTATE, DEXTROAMPHETAMINE SULFATE AND AMPHETAMINE SULFATE 1.875; 1.875; 1.875; 1.875 MG/1; MG/1; MG/1; MG/1
TABLET ORAL
Qty: 30 TAB | Refills: 0 | Status: SHIPPED | OUTPATIENT
Start: 2019-06-13 | End: 2019-07-16 | Stop reason: SDUPTHER

## 2019-06-13 NOTE — TELEPHONE ENCOUNTER
From: [place]  To: Magnolia Kinney MD  Sent: 6/11/2019 1:51 PM EDT  Subject: Prescription Question    I am getting my 4 wisdom teeth out on 6/26 by [facility]. My insurance won't cover general anesthesia or oral sedation. My dentist said I can take 1mg-2mg of my Ignacia Tavo prior to my appointment to help my anxiety since I will only be receiving novocaine. Is this ok? If so, I will need a refill. I took most of my last klonapin script when the one generic adderall brand was causing chest pains. I was thinking it was anxiety related but it wasn't (See previous messages between LPN & I).

## 2019-06-18 RX ORDER — RIZATRIPTAN BENZOATE 10 MG/1
10 TABLET, ORALLY DISINTEGRATING ORAL
Qty: 4 TAB | Refills: 1 | Status: SHIPPED | OUTPATIENT
Start: 2019-06-18 | End: 2019-07-09 | Stop reason: SDUPTHER

## 2019-06-19 ENCOUNTER — TELEPHONE (OUTPATIENT)
Dept: INTERNAL MEDICINE CLINIC | Age: 29
End: 2019-06-19

## 2019-06-19 NOTE — TELEPHONE ENCOUNTER
Pt picked up rx's today for our office but did not want the klonopin rx. Pt stated she thought the doctor phoned it in to her local pharmacy. I gave the original to the nurse.      Thank you

## 2019-06-21 ENCOUNTER — DOCUMENTATION ONLY (OUTPATIENT)
Dept: INTERNAL MEDICINE CLINIC | Age: 29
End: 2019-06-21

## 2019-07-09 DIAGNOSIS — G43.819 OTHER MIGRAINE WITHOUT STATUS MIGRAINOSUS, INTRACTABLE: Primary | ICD-10-CM

## 2019-07-10 RX ORDER — RIZATRIPTAN BENZOATE 10 MG/1
10 TABLET, ORALLY DISINTEGRATING ORAL
Qty: 4 TAB | Refills: 1 | Status: SHIPPED | OUTPATIENT
Start: 2019-07-10 | End: 2019-07-26 | Stop reason: SDUPTHER

## 2019-07-16 DIAGNOSIS — F90.2 ATTENTION DEFICIT HYPERACTIVITY DISORDER (ADHD), COMBINED TYPE: ICD-10-CM

## 2019-07-16 DIAGNOSIS — F41.8 SITUATIONAL ANXIETY: ICD-10-CM

## 2019-07-16 RX ORDER — CLONAZEPAM 0.5 MG/1
TABLET ORAL
Qty: 8 TAB | Refills: 0 | Status: SHIPPED | OUTPATIENT
Start: 2019-07-16 | End: 2019-08-05 | Stop reason: SDUPTHER

## 2019-07-16 RX ORDER — DEXTROAMPHETAMINE SACCHARATE, AMPHETAMINE ASPARTATE, DEXTROAMPHETAMINE SULFATE AND AMPHETAMINE SULFATE 1.875; 1.875; 1.875; 1.875 MG/1; MG/1; MG/1; MG/1
TABLET ORAL
Qty: 30 TAB | Refills: 0 | Status: SHIPPED | OUTPATIENT
Start: 2019-07-16 | End: 2019-08-26 | Stop reason: SDUPTHER

## 2019-07-16 RX ORDER — DEXTROAMPHETAMINE SACCHARATE, AMPHETAMINE ASPARTATE, DEXTROAMPHETAMINE SULFATE AND AMPHETAMINE SULFATE 1.25; 1.25; 1.25; 1.25 MG/1; MG/1; MG/1; MG/1
TABLET ORAL
Qty: 34 TAB | Refills: 0 | Status: SHIPPED | OUTPATIENT
Start: 2019-07-16 | End: 2019-08-26 | Stop reason: SDUPTHER

## 2019-07-26 ENCOUNTER — PATIENT MESSAGE (OUTPATIENT)
Dept: INTERNAL MEDICINE CLINIC | Age: 29
End: 2019-07-26

## 2019-07-26 DIAGNOSIS — G43.819 OTHER MIGRAINE WITHOUT STATUS MIGRAINOSUS, INTRACTABLE: ICD-10-CM

## 2019-07-26 RX ORDER — RIZATRIPTAN BENZOATE 10 MG/1
10 TABLET, ORALLY DISINTEGRATING ORAL
Qty: 4 TAB | Refills: 1 | Status: SHIPPED | OUTPATIENT
Start: 2019-07-26 | End: 2019-08-05 | Stop reason: SDUPTHER

## 2019-07-26 RX ORDER — RIZATRIPTAN BENZOATE 10 MG/1
10 TABLET ORAL
Qty: 4 TAB | Refills: 3 | Status: SHIPPED | OUTPATIENT
Start: 2019-07-26 | End: 2019-08-05 | Stop reason: SDUPTHER

## 2019-07-26 NOTE — TELEPHONE ENCOUNTER
From: [place]  To: Pako Toussaint MD  Sent: 7/26/2019 7:38 AM EDT  Subject: Prescription Question    I'm going to be taking a 3 week vacation at the end of August. I'd like to \"stock up\" on my migraine medication (ODT) to be sure I'm covered while traveling. My insurance will only approve 4 pills per week. Therefore, I will be requesting a refill rizatriptin ODT every week up until I leave on 8/18.

## 2019-08-02 ENCOUNTER — DOCUMENTATION ONLY (OUTPATIENT)
Dept: INTERNAL MEDICINE CLINIC | Age: 29
End: 2019-08-02

## 2019-08-02 NOTE — PROGRESS NOTES
Pt picked up Rx for the following: Klonopin 0.5mg #8, Adderall 5mg #34, Adderall 7.5mg #30 on 7/24/19.

## 2019-08-05 ENCOUNTER — PATIENT MESSAGE (OUTPATIENT)
Dept: INTERNAL MEDICINE CLINIC | Age: 29
End: 2019-08-05

## 2019-08-05 DIAGNOSIS — F41.8 SITUATIONAL ANXIETY: ICD-10-CM

## 2019-08-05 DIAGNOSIS — G43.819 OTHER MIGRAINE WITHOUT STATUS MIGRAINOSUS, INTRACTABLE: ICD-10-CM

## 2019-08-05 RX ORDER — RIZATRIPTAN BENZOATE 10 MG/1
10 TABLET, ORALLY DISINTEGRATING ORAL
Qty: 4 TAB | Refills: 1 | Status: SHIPPED | OUTPATIENT
Start: 2019-08-05 | End: 2019-08-30 | Stop reason: SDUPTHER

## 2019-08-05 RX ORDER — RIZATRIPTAN BENZOATE 10 MG/1
10 TABLET ORAL
Qty: 4 TAB | Refills: 3 | Status: SHIPPED | OUTPATIENT
Start: 2019-08-05 | End: 2019-10-23 | Stop reason: ALTCHOICE

## 2019-08-05 RX ORDER — CLONAZEPAM 0.5 MG/1
TABLET ORAL
Qty: 8 TAB | Refills: 0 | OUTPATIENT
Start: 2019-08-05 | End: 2019-10-07 | Stop reason: SDUPTHER

## 2019-08-05 NOTE — TELEPHONE ENCOUNTER
From: [place]  To: Gigi Will MD  Sent: 8/5/2019 1:09 PM EDT  Subject: Non-Urgent Medical Question    I'm sending a refill request for my migraine ODT med and clonazepam. I know it's a bit early for clonazepam, but I'm going on that 3 week trip with my family and would like to have it on hand. . especially because my family is a Huge anxiety trigger.     Thanks,    [facility] [employer]

## 2019-08-06 ENCOUNTER — TELEPHONE (OUTPATIENT)
Dept: INTERNAL MEDICINE CLINIC | Age: 29
End: 2019-08-06

## 2019-08-26 ENCOUNTER — OFFICE VISIT (OUTPATIENT)
Dept: INTERNAL MEDICINE CLINIC | Age: 29
End: 2019-08-26

## 2019-08-26 VITALS
SYSTOLIC BLOOD PRESSURE: 121 MMHG | RESPIRATION RATE: 18 BRPM | HEART RATE: 65 BPM | HEIGHT: 67 IN | OXYGEN SATURATION: 98 % | WEIGHT: 136.2 LBS | TEMPERATURE: 98 F | DIASTOLIC BLOOD PRESSURE: 65 MMHG | BODY MASS INDEX: 21.38 KG/M2

## 2019-08-26 DIAGNOSIS — Z91.018 MULTIPLE FOOD ALLERGIES: Primary | ICD-10-CM

## 2019-08-26 DIAGNOSIS — G43.919 INTRACTABLE MIGRAINE WITHOUT STATUS MIGRAINOSUS, UNSPECIFIED MIGRAINE TYPE: ICD-10-CM

## 2019-08-26 DIAGNOSIS — F90.2 ATTENTION DEFICIT HYPERACTIVITY DISORDER (ADHD), COMBINED TYPE: ICD-10-CM

## 2019-08-26 DIAGNOSIS — Z00.00 WELL ADULT EXAM: ICD-10-CM

## 2019-08-26 RX ORDER — DEXTROAMPHETAMINE SACCHARATE, AMPHETAMINE ASPARTATE, DEXTROAMPHETAMINE SULFATE AND AMPHETAMINE SULFATE 1.25; 1.25; 1.25; 1.25 MG/1; MG/1; MG/1; MG/1
TABLET ORAL
Qty: 46 TAB | Refills: 0 | Status: SHIPPED | OUTPATIENT
Start: 2019-09-26 | End: 2019-12-03 | Stop reason: SDUPTHER

## 2019-08-26 RX ORDER — DEXTROAMPHETAMINE SACCHARATE, AMPHETAMINE ASPARTATE, DEXTROAMPHETAMINE SULFATE AND AMPHETAMINE SULFATE 1.25; 1.25; 1.25; 1.25 MG/1; MG/1; MG/1; MG/1
TABLET ORAL
Qty: 46 TAB | Refills: 0 | Status: SHIPPED | OUTPATIENT
Start: 2019-10-26 | End: 2019-11-18 | Stop reason: SDUPTHER

## 2019-08-26 RX ORDER — ASCORBIC ACID 500 MG
TABLET ORAL
COMMUNITY

## 2019-08-26 RX ORDER — DEXTROAMPHETAMINE SACCHARATE, AMPHETAMINE ASPARTATE, DEXTROAMPHETAMINE SULFATE AND AMPHETAMINE SULFATE 1.25; 1.25; 1.25; 1.25 MG/1; MG/1; MG/1; MG/1
TABLET ORAL
Qty: 46 TAB | Refills: 0 | Status: SHIPPED | OUTPATIENT
Start: 2019-08-26 | End: 2019-08-26 | Stop reason: SDUPTHER

## 2019-08-26 RX ORDER — DEXTROAMPHETAMINE SACCHARATE, AMPHETAMINE ASPARTATE, DEXTROAMPHETAMINE SULFATE AND AMPHETAMINE SULFATE 1.875; 1.875; 1.875; 1.875 MG/1; MG/1; MG/1; MG/1
7.5 TABLET ORAL DAILY
Qty: 30 TAB | Refills: 0 | Status: SHIPPED | OUTPATIENT
Start: 2019-10-26 | End: 2019-11-18 | Stop reason: SDUPTHER

## 2019-08-26 RX ORDER — DEXTROAMPHETAMINE SACCHARATE, AMPHETAMINE ASPARTATE, DEXTROAMPHETAMINE SULFATE AND AMPHETAMINE SULFATE 1.875; 1.875; 1.875; 1.875 MG/1; MG/1; MG/1; MG/1
7.5 TABLET ORAL DAILY
Qty: 30 TAB | Refills: 0 | Status: SHIPPED | OUTPATIENT
Start: 2019-09-26 | End: 2019-11-18 | Stop reason: SDUPTHER

## 2019-08-26 RX ORDER — DEXTROAMPHETAMINE SACCHARATE, AMPHETAMINE ASPARTATE, DEXTROAMPHETAMINE SULFATE AND AMPHETAMINE SULFATE 1.875; 1.875; 1.875; 1.875 MG/1; MG/1; MG/1; MG/1
TABLET ORAL
Qty: 30 TAB | Refills: 0 | Status: SHIPPED | OUTPATIENT
Start: 2019-08-26 | End: 2019-12-03 | Stop reason: SDUPTHER

## 2019-08-26 NOTE — PROGRESS NOTES
Diagnoses and all orders for this visit:    1. Multiple food allergies  Patient with possible allergy as trigger for migraines. She would like to also be checked for chickpea and hops but this is not available. Discussed with her if she has any abnormalities in her readings see allergy. She agrees.  -     FOOD ALLERGY PROFILE  -     ALLERGEN PANEL, FISH  -     ALLERGEN PROFILE, FOOD-GRAIN    2. Attention deficit hyperactivity disorder (ADHD), combined type  Are improving  Congratulated patient on her improved work and behavioral changes.  -     REFERRAL TO NEUROLOGY  -     REFERRAL TO PSYCHIATRY  -     dextroamphetamine-amphetamine (ADDERALL) 7.5 mg tablet; Take 1 tab po in the morning  -     dextroamphetamine-amphetamine (ADDERALL) 7.5 mg tablet; Take 1 Tab by mouth daily. Max Daily Amount: 7.5 mg.  -     dextroamphetamine-amphetamine (ADDERALL) 7.5 mg tablet; Take 1 Tab by mouth daily. Max Daily Amount: 7.5 mg.  -     dextroamphetamine-amphetamine (ADDERALL) 5 mg tablet; Take 1 tab at lunch and if needed take an additional tab at 4 pm  Indications: Attention Deficit Disorder with Hyperactivity  -     dextroamphetamine-amphetamine (ADDERALL) 5 mg tablet; Take 1 po at noon and if needed 1 tab at 4 pm    3. Well adult exam  This is not a well adult she just did not get her labs from her well adult exam  -     VITAMIN B12 & FOLATE  -     MTHFR MUTATION DETECTION  -     METABOLIC PANEL, COMPREHENSIVE  -     TSH 3RD GENERATION  -     LIPID PANEL  -     CBC W/O DIFF    4.  Intractable migraine without status migrainosus, unspecified migraine type  We will work-up for underlying causes for migraines  -     VITAMIN B12 & FOLATE  -     MTHFR MUTATION DETECTION

## 2019-08-26 NOTE — PROGRESS NOTES
All health maintenance and other pertinent information has been reviewed in preparation for today's office visit. Patient presents in the office today for:    Chief Complaint   Patient presents with    Follow-up     Situational Anxiety, Migraines     1. Have you been to the ER, urgent care clinic since your last visit? Hospitalized since your last visit? No    2. Have you seen or consulted any other health care providers outside of the [address] [place] [address] since your last visit? Include any pap smears or colon screening.  No

## 2019-08-26 NOTE — PROGRESS NOTES
Dahlia Hidalgo is a 34 y.o. female and presents with Follow-up (Situational Anxiety, Migraines, ) and Medication Refill  . Patient is here to follow-up on her attention deficit medication and anxiety. 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 She has 1 more semester and then will be completed. She is currently taking her Adderall 7.5 mg in the morning and 5 mg at noon. 5 mg in the evening. Tuesday may take a day off   Added supplements Ca, Mag,Zinc, L theonine 1 hour  Eating 3 meals a day, weight is stable  Sleeping at night 7-9 hours, occasionally gets 6 hours sleep, feels well rested when wakes up  When on vacation she does not take    Migraine  Patient reports she is still having migraines. She notes her Maxalt is not working as well as it used to. She has a relative who is a neurologist and recommended doing some allergy testing. She would like to see Dr. Christopher Yusuf as he was recommended by her peers. Anxiety  Since she has been on the Adderall she reports her anxiety is much better. She was concerned that she might not be able to sleep but is now sleeping very well. She reports that she just uses her clonazepam when she interacts with her parents. She would like to see a counselor but is not really ready to do so. 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 Jaw pain  Improved since last visit. Review of Systems  Constitutional: negative for fevers, chills, anorexia and weight loss  Eyes:   negative for visual disturbance and irritation  ENT:   negative for tinnitus,sore throat,nasal congestion,ear pains. hoarseness  Respiratory:  negative for cough, hemoptysis, dyspnea,wheezing  CV:   negative for chest pain, palpitations, lower extremity edema  GI:   negative for nausea, vomiting, diarrhea, abdominal pain,melena  Endo:               negative for polyuria,polydipsia,polyphagia,heat intolerance  Genitourinary: negative for frequency, dysuria and hematuria  Integument:  negative for rash and pruritus  Hematologic:  negative for easy bruising and gum/nose bleeding  Musculoskel: negative for myalgias, arthralgias, back pain, muscle weakness, joint pain  Neurological:  negative for headaches, dizziness, vertigo, memory problems and gait   Behavl/Psych: negative for feelings of anxiety, depression, mood changes    Past Medical History:   Diagnosis Date    Anxiety     prn clonazepam    Headache     controlled with magnesium and prn maxalt     History reviewed. No pertinent surgical history. Social History     Socioeconomic History    Marital status: SINGLE     Spouse name: Not on file    Number of children: Not on file    Years of education: Not on file    Highest education level: Not on file   Tobacco Use    Smoking status: Never Smoker    Smokeless tobacco: Never Used   Substance and Sexual Activity    Alcohol use:  Yes     Alcohol/week: 3.0 standard drinks     Types: 1 Glasses of wine, 1 Cans of beer, 1 Shots of liquor per week     Comment: occasional weekend    Drug use: No    Sexual activity: Yes     Partners: Male     Birth control/protection: Condom     Comment: followed by [place] physicians   Social History Narrative    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[place], clinicals through [employer]        No kids    Boyfriend, Nichole Mcgowan X 5 years     Family History   Problem Relation Age of Onset    Diabetes Mother         prediabetic    Hypertension Mother     Arthritis-osteo Father     Hypertension Father     Cancer Maternal Grandmother         lung    Heart Disease Neg Hx     Heart Attack Neg Hx        Allergies   Allergen Reactions    Dilaudid [Hydromorphone] Hives       Objective:  Visit Vitals  /65 (BP 1 Location: Left arm, BP Patient Position: Sitting)   Pulse 65   Temp 98 °F (36.7 °C) (Oral)   Resp 18   Ht 5' 7\" (1.702 m)   Wt 136 lb 3.2 oz (61.8 kg)   LMP 08/24/2019   SpO2 98%   BMI 21.33 kg/m²     Physical Exam:   General appearance - alert, well appearing, and in no distress  Mental status - alert, oriented to person, place, and time  EYE-GEORGE, EOMI,  corneas normal, no foreign bodies, visual acuity normal both eyes, no periorbital cellulitis  ENT-ENT exam normal, no neck nodes or sinus tenderness  Nose - normal and patent, no erythema, discharge or polyps  Mouth - mucous membranes moist, pharynx normal without lesions, left lower wisdom tooth pain on palpation, white dentate line noted on bucca mucosa  Neck - supple, no significant adenopathy   Chest - clear to auscultation, no wheezes, rales or rhonchi, symmetric air entry   Heart - normal rate, regular rhythm, normal S1, S2, no murmurs, rubs, clicks or gallops   Abdomen - soft, nontender, nondistended, no masses or organomegaly  Lymph- no adenopathy palpable  Ext-peripheral pulses normal, no pedal edema, no clubbing or cyanosis  Skin-Warm and dry.  no hyperpigmentation, vitiligo, or suspicious lesions  Neuro -alert, oriented, normal speech, no focal findings or movement disorder noted          Results for orders placed or performed in visit on 02/08/18   URINALYSIS W/ RFLX MICROSCOPIC   Result Value Ref Range    Specific Gravity 1.006 1.005 - 1.030    pH (UA) 7.0 5.0 - 7.5    Color Yellow Yellow    Appearance Clear Clear    Leukocyte Esterase Negative Negative    Protein Negative Negative/Trace    Glucose Negative Negative    Ketone Negative Negative    Blood Negative Negative    Bilirubin Negative Negative    Urobilinogen 0.2 0.2 - 1.0 mg/dL    Nitrites Negative Negative    Microscopic Examination Comment    CBC W/O DIFF   Result Value Ref Range    WBC 5.0 3.4 - 10.8 x10E3/uL    RBC 4.41 3.77 - 5.28 x10E6/uL    HGB 13.1 11.1 - 15.9 g/dL    HCT 39.3 34.0 - 46.6 %    MCV 89 79 - 97 fL    MCH 29.7 26.6 - 33.0 pg    MCHC 33.3 31.5 - 35.7 g/dL    RDW 12.9 12.3 - 15.4 %    PLATELET 084 150 - 379 [id number]   METABOLIC PANEL, COMPREHENSIVE   Result Value Ref Range    Glucose 87 65 - 99 mg/dL    BUN 12 6 - 20 mg/dL    Creatinine 0.60 0.57 - 1.00 mg/dL    GFR est non- >59 mL/min/1.73    GFR est  >59 mL/min/1.73    BUN/Creatinine ratio 20 9 - 23    Sodium 139 134 - 144 mmol/L    Potassium 3.8 3.5 - 5.2 mmol/L    Chloride 98 96 - 106 mmol/L    CO2 25 18 - 29 mmol/L    Calcium 9.5 8.7 - 10.2 mg/dL    Protein, total 7.8 6.0 - 8.5 g/dL    Albumin 4.5 3.5 - 5.5 g/dL    GLOBULIN, TOTAL 3.3 1.5 - 4.5 g/dL    A-G Ratio 1.4 1.2 - 2.2    Bilirubin, total 0.4 0.0 - 1.2 mg/dL    Alk. phosphatase 41 39 - 117 IU/L    AST (SGOT) 18 0 - 40 IU/L    ALT (SGPT) 16 0 - 32 IU/L   TSH 3RD GENERATION   Result Value Ref Range    TSH 3.270 0.450 - 4.500 uIU/mL   VITAMIN D, 25 HYDROXY   Result Value Ref Range    VITAMIN D, 25-HYDROXY 36.4 30.0 - 100.0 ng/mL   HEMOGLOBIN A1C WITH EAG   Result Value Ref Range    Hemoglobin A1c 4.8 4.8 - 5.6 %    Estimated average glucose 91 mg/dL   GELACIO W/REFLEX   Result Value Ref Range    Antinuclear Antibodies Direct Negative Negative   SED RATE (ESR)   Result Value Ref Range    Sed rate (ESR) 2 0 - 32 mm/hr     Prevention    Cardiovascular profile  Family hx  Exercising:  Hot house yoga, walk /running   Blood pressure:  Health healthy diet:  Diabetes:  Cholesterol:  Renal function:      Cancer risk profile  Mammogram  Lung  Colonoscopy  Skin nonhealing in 2 weeks--mole flat and looks like may be changing dermatology in March   Gyn abnormal bleeding/discharge/abd pain/pressure- [facility]  Discussion with gyn re: birth control, not on bcp.  She is using condoms with boyfriend  nuva ring      Thyroid sx  Weight stable   No sx    Osteopenia prevention  Calcium 1000mg/day yes  Vitamin D 800iu/day yes    Mental health scale:   Took clonazepma when coming off zoloft    Depression  Anxiety  Sleep # of hours:  Energy Level:        Immunizations  uptodate through work  [employer] 10/14  Pneumonia vaccine  Flu vaccine 10/14  PPD negative    HPV- completed gardisil vaccine    Diagnoses and all orders for this visit:    1. Jaw pain, non-TMJ  Patient with pain on palpation of left lower wisdom tooth. If patient is grinding teeth which she is she may be causing further impact on the wisdom tooth. She will need to follow-up with her dentist.  Patient can take Augmentin if cannot be seen by her dentist this week. 2. Attention deficit hyperactivity disorder (ADHD), combined type  Not optimally controlled  Will adjust medicine  I discussed with patient that we may need to transition to a long-acting Adderall and if she needs to take evening classes may need to use a short acting dose in the evening such that it does not affect her sleep. 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 Anxiety  Improved with treatment of attention deficit    Other orders  -     amoxicillin-clavulanate (AUGMENTIN) 875-125 mg per tablet; Take 1 Tab by mouth two (2) times a day. Follow-up in 2 months or earlier if needed for medication adjustment of her Adderall.

## 2019-08-28 ENCOUNTER — TELEPHONE (OUTPATIENT)
Dept: INTERNAL MEDICINE CLINIC | Age: 29
End: 2019-08-28

## 2019-08-28 NOTE — TELEPHONE ENCOUNTER
Pt referred by  for help with her ADHD and anxiety. 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 Doesn't think she has time as she is working full time and going to school. Pt will call office when she is ready.

## 2019-08-29 ENCOUNTER — PATIENT MESSAGE (OUTPATIENT)
Dept: INTERNAL MEDICINE CLINIC | Age: 29
End: 2019-08-29

## 2019-08-29 DIAGNOSIS — G43.019 INTRACTABLE MIGRAINE WITHOUT AURA AND WITHOUT STATUS MIGRAINOSUS: Primary | ICD-10-CM

## 2019-08-29 NOTE — TELEPHONE ENCOUNTER
From: [place]  To: Ozzie Narayan MD  Sent: 8/29/2019 12:53 PM EDT  Subject: Referral Request    Hi-- I had received a referral to neurology at my appointment on 8/26. However, the referral says the diagnosis associated with the referral is ADHD. Can you please change the diagnosis to Migraine without aura and fax it to Dr. Allyson Gibbs at [phone].     Thanks,    [place] [facility] [employer]

## 2019-08-30 DIAGNOSIS — G43.819 OTHER MIGRAINE WITHOUT STATUS MIGRAINOSUS, INTRACTABLE: ICD-10-CM

## 2019-09-03 RX ORDER — RIZATRIPTAN BENZOATE 10 MG/1
10 TABLET, ORALLY DISINTEGRATING ORAL
Qty: 4 TAB | Refills: 1 | Status: SHIPPED | OUTPATIENT
Start: 2019-09-03 | End: 2019-10-01 | Stop reason: SDUPTHER

## 2019-09-05 LAB
ALBUMIN SERPL-MCNC: 4.6 G/DL (ref 3.5–5.5)
ALBUMIN/GLOB SERPL: 1.4 {RATIO} (ref 1.2–2.2)
ALP SERPL-CCNC: 48 IU/L (ref 39–117)
ALT SERPL-CCNC: 12 IU/L (ref 0–32)
AST SERPL-CCNC: 15 IU/L (ref 0–40)
BARLEY IGE QN: <0.1 KU/L
BILIRUB SERPL-MCNC: 0.4 MG/DL (ref 0–1.2)
BUN SERPL-MCNC: 12 MG/DL (ref 6–20)
BUN/CREAT SERPL: 17 (ref 9–23)
CALCIUM SERPL-MCNC: 9.5 MG/DL (ref 8.7–10.2)
CHLORIDE SERPL-SCNC: 101 MMOL/L (ref 96–106)
CHOLEST SERPL-MCNC: 140 MG/DL (ref 100–199)
CLAM IGE QN: <0.1 KU/L
CO2 SERPL-SCNC: 24 MMOL/L (ref 20–29)
CODFISH IGE QN: <0.1 KU/L
CORN IGE QN: <0.1 KU/L
COW MILK IGE QN: <0.1 KU/L
CREAT SERPL-MCNC: 0.71 MG/DL (ref 0.57–1)
EGG WHITE IGE QN: <0.1 KU/L
ERYTHROCYTE [DISTWIDTH] IN BLOOD BY AUTOMATED COUNT: 12.6 % (ref 12.3–15.4)
FOLATE SERPL-MCNC: 19 NG/ML
GLOBULIN SER CALC-MCNC: 3.2 G/DL (ref 1.5–4.5)
GLUCOSE SERPL-MCNC: 92 MG/DL (ref 65–99)
HALIBUT IGE QN: <0.1 KU/L
HCT VFR BLD AUTO: 40.7 % (ref 34–46.6)
HDLC SERPL-MCNC: 73 MG/DL
HGB BLD-MCNC: 13.4 G/DL (ref 11.1–15.9)
INTERPRETATION, 910389: NORMAL
LDLC SERPL CALC-MCNC: 55 MG/DL (ref 0–99)
Lab: NORMAL
MACKEREL IGE QN: <0.1 KU/L
MCH RBC QN AUTO: 30.1 PG (ref 26.6–33)
MCHC RBC AUTO-ENTMCNC: 32.9 G/DL (ref 31.5–35.7)
MCV RBC AUTO: 92 FL (ref 79–97)
MTHFR GENE MUT ANL BLD/T: NORMAL
OAT IGE QN: <0.1 KU/L
PEANUT IGE QN: <0.1 KU/L
PLATELET # BLD AUTO: 275 X10E3/UL (ref 150–450)
POTASSIUM SERPL-SCNC: 4.1 MMOL/L (ref 3.5–5.2)
PROT SERPL-MCNC: 7.8 G/DL (ref 6–8.5)
RBC # BLD AUTO: 4.45 X10E6/UL (ref 3.77–5.28)
RICE IGE QN: <0.1 KU/L
RYE IGE QN: <0.1 KU/L
SALMON IGE QN: <0.1 KU/L
SCALLOP IGE QN: <0.1 KU/L
SESAME SEED IGE QN: <0.1 KU/L
SHRIMP IGE QN: <0.1 KU/L
SODIUM SERPL-SCNC: 140 MMOL/L (ref 134–144)
SOYBEAN IGE QN: <0.1 KU/L
TRIGL SERPL-MCNC: 60 MG/DL (ref 0–149)
TROUT IGE QN: <0.1 KU/L
TSH SERPL DL<=0.005 MIU/L-ACNC: 2.18 UIU/ML (ref 0.45–4.5)
TUNA IGE QN: <0.1 KU/L
VIT B12 SERPL-MCNC: 559 PG/ML (ref 232–1245)
VLDLC SERPL CALC-MCNC: 12 MG/DL (ref 5–40)
WALLEYE PIKE IGE QN: <0.1 KU/L
WALNUT IGE QN: <0.1 KU/L
WBC # BLD AUTO: 5.8 X10E3/UL (ref 3.4–10.8)
WHEAT IGE QN: <0.1 KU/L

## 2019-10-01 DIAGNOSIS — F41.8 SITUATIONAL ANXIETY: ICD-10-CM

## 2019-10-01 DIAGNOSIS — J45.20 MILD INTERMITTENT REACTIVE AIRWAY DISEASE WITHOUT COMPLICATION: ICD-10-CM

## 2019-10-01 DIAGNOSIS — G43.819 OTHER MIGRAINE WITHOUT STATUS MIGRAINOSUS, INTRACTABLE: ICD-10-CM

## 2019-10-01 RX ORDER — RIZATRIPTAN BENZOATE 10 MG/1
10 TABLET, ORALLY DISINTEGRATING ORAL
Qty: 4 TAB | Refills: 1 | Status: SHIPPED | OUTPATIENT
Start: 2019-10-01 | End: 2019-11-07 | Stop reason: SDUPTHER

## 2019-10-01 RX ORDER — ALBUTEROL SULFATE 90 UG/1
1 AEROSOL, METERED RESPIRATORY (INHALATION)
Qty: 1 INHALER | Refills: 6 | Status: SHIPPED | OUTPATIENT
Start: 2019-10-01 | End: 2020-02-19 | Stop reason: SDUPTHER

## 2019-10-01 RX ORDER — CLONAZEPAM 0.5 MG/1
TABLET ORAL
Qty: 8 TAB | Refills: 0 | OUTPATIENT
Start: 2019-10-01

## 2019-10-07 DIAGNOSIS — F41.8 SITUATIONAL ANXIETY: ICD-10-CM

## 2019-10-07 RX ORDER — CLONAZEPAM 0.5 MG/1
TABLET ORAL
Qty: 8 TAB | Refills: 0 | OUTPATIENT
Start: 2019-10-07 | End: 2019-11-07 | Stop reason: SDUPTHER

## 2019-10-08 ENCOUNTER — TELEPHONE (OUTPATIENT)
Dept: INTERNAL MEDICINE CLINIC | Age: 29
End: 2019-10-08

## 2019-10-23 DIAGNOSIS — G43.919 INTRACTABLE MIGRAINE WITHOUT STATUS MIGRAINOSUS, UNSPECIFIED MIGRAINE TYPE: Primary | ICD-10-CM

## 2019-10-23 RX ORDER — RIZATRIPTAN BENZOATE 10 MG/1
10 TABLET ORAL
Qty: 20 TAB | Refills: 0 | Status: SHIPPED | OUTPATIENT
Start: 2019-10-23 | End: 2019-11-04 | Stop reason: ALTCHOICE

## 2019-11-04 DIAGNOSIS — G43.919 INTRACTABLE MIGRAINE WITHOUT STATUS MIGRAINOSUS, UNSPECIFIED MIGRAINE TYPE: ICD-10-CM

## 2019-11-04 RX ORDER — RIZATRIPTAN BENZOATE 10 MG/1
10 TABLET ORAL
Qty: 20 TAB | Refills: 0 | Status: SHIPPED | OUTPATIENT
Start: 2019-11-04 | End: 2020-09-18 | Stop reason: ALTCHOICE

## 2019-11-07 ENCOUNTER — HOSPITAL ENCOUNTER (OUTPATIENT)
Dept: MAMMOGRAPHY | Age: 29
Discharge: HOME | End: 2019-11-07
Attending: INTERNAL MEDICINE
Payer: COMMERCIAL

## 2019-11-07 ENCOUNTER — OFFICE VISIT (OUTPATIENT)
Dept: INTERNAL MEDICINE CLINIC | Age: 29
End: 2019-11-07

## 2019-11-07 VITALS
HEART RATE: 88 BPM | WEIGHT: 131.6 LBS | HEIGHT: 67 IN | TEMPERATURE: 97.8 F | RESPIRATION RATE: 12 BRPM | DIASTOLIC BLOOD PRESSURE: 71 MMHG | SYSTOLIC BLOOD PRESSURE: 131 MMHG | BODY MASS INDEX: 20.65 KG/M2 | OXYGEN SATURATION: 98 %

## 2019-11-07 DIAGNOSIS — F41.8 SITUATIONAL ANXIETY: ICD-10-CM

## 2019-11-07 DIAGNOSIS — N60.02 SOLITARY CYST OF LEFT BREAST: ICD-10-CM

## 2019-11-07 DIAGNOSIS — N63.20 LEFT BREAST LUMP: ICD-10-CM

## 2019-11-07 DIAGNOSIS — G43.819 OTHER MIGRAINE WITHOUT STATUS MIGRAINOSUS, INTRACTABLE: ICD-10-CM

## 2019-11-07 DIAGNOSIS — N63.20 MASS OF BREAST, LEFT: Primary | ICD-10-CM

## 2019-11-07 DIAGNOSIS — M35.9 CONNECTIVE TISSUE DISEASE, UNDIFFERENTIATED (HCC): ICD-10-CM

## 2019-11-07 PROCEDURE — 76642 ULTRASOUND BREAST LIMITED: CPT

## 2019-11-07 RX ORDER — CLONAZEPAM 0.5 MG/1
TABLET ORAL
Qty: 10 TAB | Refills: 0 | Status: SHIPPED | OUTPATIENT
Start: 2019-11-07 | End: 2019-12-19 | Stop reason: SDUPTHER

## 2019-11-07 RX ORDER — RIZATRIPTAN BENZOATE 10 MG/1
10 TABLET, ORALLY DISINTEGRATING ORAL
Qty: 12 TAB | Refills: 1 | Status: SHIPPED | OUTPATIENT
Start: 2019-11-07 | End: 2019-11-18 | Stop reason: SDUPTHER

## 2019-11-07 NOTE — PROGRESS NOTES
Chief Complaint   Patient presents with    Breast Mass     Left breast mass  Patient reports that she has a nodule in her left breast that has been present for a month. She reports that she first detected it last month before her. .  She continue to monitor it but it did not improve or change for 30 days. She denies nipple discharge or pain. She is concerned because it has features consistent with a breast cancer hard immobile    Situational stress  Patient reports that she is planning on breaking up with her boyfriend of 10 years. 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 Migraine  Patient reports that she needs a refill on her Maxalt. She never refilled it up in [place]. She reports her migraines are worse because of the stress and the ending of her semester. 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 She was asked to get an EKG as a baseline. Past Medical History:   Diagnosis Date    Anxiety     prn clonazepam    Headache     controlled with magnesium and prn maxalt     History reviewed. No pertinent surgical history. Social History     Socioeconomic History    Marital status: SINGLE     Spouse name: Not on file    Number of children: Not on file    Years of education: Not on file    Highest education level: Not on file   Tobacco Use    Smoking status: Never Smoker    Smokeless tobacco: Never Used   Substance and Sexual Activity    Alcohol use:  Yes     Alcohol/week: 3.0 standard drinks     Types: 1 Glasses of wine, 1 Cans of beer, 1 Shots of liquor per week     Comment: occasional weekend    Drug use: No    Sexual activity: Yes     Partners: Male     Birth control/protection: Condom     Comment: followed by [facility] [place] physicians   Social History Narrative    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 12 Tab 1    clonazePAM (KLONOPIN) 0.5 mg tablet Take 1/2 to 1 tab daily ONLY as needed. 10 Tab 0    albuterol (PROVENTIL HFA, VENTOLIN HFA, PROAIR HFA) 90 mcg/actuation inhaler Take 1 Puff by inhalation every four (4) hours as needed for Wheezing. 1 Inhaler 6    dexAMETHasone phos-lidocaine 5-10 mg/1.5 mL soln dexamethasone 10 mg/mL injection solution   ORAL - Administer 0.6 mg/kg as a one time dose. Not to Exceed 10mg. Take the IV form and give PO.      calcium-cholecalciferol, d3, (CALCIUM 600 + D) 600-125 mg-unit tab Take 1,000 mg by mouth.  zinc sulfate (ZINC-15 PO) Take  by mouth.  L-THREONINE 150 mg by Does Not Apply route.  ascorbic acid, vitamin C, (VITAMIN C) 500 mg tablet Take  by mouth.  dextroamphetamine-amphetamine (ADDERALL) 7.5 mg tablet Take 1 tab po in the morning 30 Tab 0    dextroamphetamine-amphetamine (ADDERALL) 7.5 mg tablet Take 1 Tab by mouth daily. Max Daily Amount: 7.5 mg. 30 Tab 0    dextroamphetamine-amphetamine (ADDERALL) 7.5 mg tablet Take 1 Tab by mouth daily. Max Daily Amount: 7.5 mg. 30 Tab 0    dextroamphetamine-amphetamine (ADDERALL) 5 mg tablet Take 1 tab at lunch and if needed take an additional tab at 4 pm  Indications: Attention Deficit Disorder with Hyperactivity 46 Tab 0    dextroamphetamine-amphetamine (ADDERALL) 5 mg tablet Take 1 po at noon and if needed 1 tab at 4 pm 46 Tab 0    ibuprofen (MOTRIN) 600 mg tablet Take  by mouth every six (6) hours as needed for Pain.       amoxicillin-clavulanate (AUGMENTIN) 875-125 mg per tablet Take 1 Tab by mouth two (2) times a day. 20 Tab 0    magnesium gluconate 500 mg (27 mg  elemental) tablet Take 1 Tab by mouth daily. 90 Tab 1    MULTIVITAMIN PO Take 1 Tab by mouth daily.  rizatriptan (MAXALT) 10 mg tablet Take 1 Tab by mouth once as needed for Migraine for up to 1 dose. May repeat in 2 hours if needed 20 Tab 0     Allergies   Allergen Reactions    Dilaudid [Hydromorphone] Hives       Review of Systems - General ROS: negative for - chills, fatigue, fever, hot flashes or malaise  Cardiovascular ROS: no chest pain or dyspnea on exertion  Respiratory ROS: no cough, shortness of breath, or wheezing    Visit Vitals  /71 (BP 1 Location: Left arm, BP Patient Position: Sitting)   Pulse 88   Temp 97.8 °F (36.6 °C) (Oral)   Resp 12   Ht 5' 7\" (1.702 m)   Wt 131 lb 9.6 oz (59.7 kg)   SpO2 98%   BMI 20.61 kg/m²     General Appearance:  Well developed, well nourished,alert and oriented x 3, and individual in no acute distress. Ears/Nose/Mouth/Throat:   Hearing grossly normal.  Right breast: Fibrocystic changes of right upper outer quadrant no discrete nodules, no axillary lymphadenopathy no supraclavicular lymphadenopathy left breast increase in fibrocystic changes of the left upper quadrant slightly greater than right, 4 mm BB nodule behind the areola, no axillary or supra clavicular lymphadenopathy       Neck: Supple, no lad, no bruits   Chest:   Lungs clear to auscultation bilaterally. Cardiovascular:  Regular rate and rhythm, S1, S2 normal, no murmur. Abdomen:   Soft, non-tender, bowel sounds are active. Extremities: No edema bilaterally. Skin: Warm and dry, no suspicious lesions                 Diagnoses and all orders for this visit:    1. Mass of breast, left  -     JOSEPH MAMMO LT DX INCL CAD;  Future  -     US BREAST LT COMPLETE 4 QUAD; Future  Unable to do mammogram per radiology  Patient with complex cyst of left breast.  I discussed results with patient. She would like to see Dr. Pricila Myers for follow-up. 2. Connective tissue disease, undifferentiated (HCC)  -     AMB POC EKG ROUTINE W/ 12 LEADS, INTER & REP    3. Other migraine without status migrainosus, intractable  -     rizatriptan (MAXALT-MLT) 10 mg disintegrating tablet; Take 1 Tab by mouth once as needed for Migraine for up to 1 dose. 4. Situational anxiety  -     clonazePAM (KLONOPIN) 0.5 mg tablet; Take 1/2 to 1 tab daily ONLY as needed.

## 2019-11-18 ENCOUNTER — OFFICE VISIT (OUTPATIENT)
Dept: SURGERY | Age: 29
End: 2019-11-18

## 2019-11-18 VITALS
WEIGHT: 130 LBS | HEART RATE: 99 BPM | BODY MASS INDEX: 20.4 KG/M2 | SYSTOLIC BLOOD PRESSURE: 108 MMHG | DIASTOLIC BLOOD PRESSURE: 69 MMHG | HEIGHT: 67 IN

## 2019-11-18 DIAGNOSIS — G43.819 OTHER MIGRAINE WITHOUT STATUS MIGRAINOSUS, INTRACTABLE: ICD-10-CM

## 2019-11-18 DIAGNOSIS — N60.02 BREAST CYST, LEFT: Primary | ICD-10-CM

## 2019-11-18 RX ORDER — RIZATRIPTAN BENZOATE 10 MG/1
10 TABLET, ORALLY DISINTEGRATING ORAL
Qty: 12 TAB | Refills: 1 | Status: SHIPPED | OUTPATIENT
Start: 2019-11-18 | End: 2019-12-19 | Stop reason: SDUPTHER

## 2019-11-18 RX ORDER — RIZATRIPTAN BENZOATE 10 MG/1
TABLET ORAL
COMMUNITY
Start: 2019-11-05 | End: 2019-12-23 | Stop reason: SDUPTHER

## 2019-11-18 NOTE — PROGRESS NOTES
HISTORY OF PRESENT ILLNESS  Alana Rivera is a 34 y.o. female. HPI NEW Patient presents for consultation at the request of Dr. Giovana Martin for LEFT breast lump. Had noticed it for about 3-4 prior to breast ultrasound which was ordered by her PCP. No pain or discomfort. Family history-  Maternal great aunt had breast cancer. Maternal grandmother had lung cancer. Breast imaging-  US Results (most recent):  Results from Hospital Encounter encounter on 11/07/19   US BREAST LT LIMITED=<3 QUAD    Narrative History: Left breast lump. Ultrasonography of the 3:00 position of left breast demonstrates a 3.9 mm  complex cyst in the region of palpable abnormality. Impression IMPRESSION:  1. BI-RADS category 3, probably benign. 2. The patient should return in 6 months for unilateral left breast ultrasound. 3. She was informed. Past Medical History:   Diagnosis Date    Anxiety     prn clonazepam    Headache     controlled with magnesium and prn maxalt     History reviewed. No pertinent surgical history. Social History     Socioeconomic History    Marital status: SINGLE     Spouse name: Not on file    Number of children: Not on file    Years of education: Not on file    Highest education level: Not on file   Occupational History    Not on file   Social Needs    Financial resource strain: Not on file    Food insecurity:     Worry: Not on file     Inability: Not on file    Transportation needs:     Medical: Not on file     Non-medical: Not on file   Tobacco Use    Smoking status: Never Smoker    Smokeless tobacco: Never Used   Substance and Sexual Activity    Alcohol use:  Yes     Alcohol/week: 3.0 standard drinks     Types: 1 Glasses of wine, 1 Cans of beer, 1 Shots of liquor per week     Comment: occasional weekend    Drug use: No    Sexual activity: Yes     Partners: Male     Birth control/protection: Condom     Comment: followed [facility]   Lifestyle    Physical activity:     Days per week: Not on file     Minutes per session: Not on file    Stress: Not on file   Relationships    Social connections:     Talks on phone: Not on file     Gets together: Not on file     Attends Yarsani service: Not on file     Active member of club or organization: Not on file     Attends meetings of clubs or organizations: Not on file     Relationship status: Not on file    Intimate partner violence:     Fear of current or ex partner: Not on file     Emotionally abused: Not on file     Physically abused: Not on file     Forced sexual activity: Not on file   Other Topics Concern    Not on file   Social History Narrative    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 LMP: 11/8/19. # of Children:  0. Age at Delivery of First Child:  n/a. Hysterectomy/oophorectomy:  NO/NO. Breast Bx:  no.  Hx of Breast Feeding:  no. BCP:  no. Hormone therapy:  no.                  Current Outpatient Medications:     rizatriptan (MAXALT) 10 mg tablet, , Disp: , Rfl:     clonazePAM (KLONOPIN) 0.5 mg tablet, Take 1/2 to 1 tab daily ONLY as needed. , Disp: 10 Tab, Rfl: 0    dextroamphetamine-amphetamine (ADDERALL) 7.5 mg tablet, Take 1 tab po in the morning, Disp: 30 Tab, Rfl: 0    dextroamphetamine-amphetamine (ADDERALL) 5 mg tablet, Take 1 tab at lunch and if needed take an additional tab at 4 pm  Indications: Attention Deficit Disorder with Hyperactivity, Disp: 46 Tab, Rfl: 0    magnesium gluconate 500 mg (27 mg  elemental) tablet, Take 1 Tab by mouth daily. , Disp: 90 Tab, Rfl: 1    rizatriptan (MAXALT-MLT) 10 mg disintegrating tablet, Take 1 Tab by mouth once as needed for Migraine for up to 1 dose., Disp: 12 Tab, Rfl: 1    rizatriptan (MAXALT) 10 mg tablet, Take 1 Tab by mouth once as needed for Migraine for up to 1 dose. May repeat in 2 hours if needed, Disp: 20 Tab, Rfl: 0    albuterol (PROVENTIL HFA, VENTOLIN HFA, PROAIR HFA) 90 mcg/actuation inhaler, Take 1 Puff by inhalation every four (4) hours as needed for Wheezing., Disp: 1 Inhaler, Rfl: 6    calcium-cholecalciferol, d3, (CALCIUM 600 + D) 600-125 mg-unit tab, Take 1,000 mg by mouth., Disp: , Rfl:     zinc sulfate (ZINC-15 PO), Take  by mouth., Disp: , Rfl:     L-THREONINE, 150 mg by Does Not Apply route., Disp: , Rfl:     ascorbic acid, vitamin C, (VITAMIN C) 500 mg tablet, Take  by mouth., Disp: , Rfl:     ibuprofen (MOTRIN) 600 mg tablet, Take  by mouth every six (6) hours as needed for Pain., Disp: , Rfl:     MULTIVITAMIN PO, Take 1 Tab by mouth daily. , Disp: , Rfl:   Allergies   Allergen Reactions    Dilaudid [Hydromorphone] Hives     Review of Systems   Constitutional: Negative. HENT: Negative. Eyes: Negative. Respiratory: Negative. Cardiovascular: Negative. Gastrointestinal: Negative. Genitourinary: Negative. Musculoskeletal: Positive for joint pain and neck pain. Skin: Negative. Neurological: Negative. Endo/Heme/Allergies: Bruises/bleeds easily. Psychiatric/Behavioral: The patient is nervous/anxious. All other systems reviewed and are negative. Physical Exam   Constitutional: She is oriented to person, place, and time. She appears well-developed and well-nourished. HENT:   Head: Normocephalic. Eyes: EOM are normal.   Neck: Neck supple. No thyromegaly present. Cardiovascular: Intact distal pulses. Pulmonary/Chest: Effort normal. Right breast exhibits no inverted nipple, no mass, no nipple discharge, no skin change and no tenderness. Left breast exhibits mass. Left breast exhibits no inverted nipple, no nipple discharge, no skin change and no tenderness. Breasts are symmetrical.       Small 4 mm superficial mass 2:00 left breast retroareolar   Abdominal: Soft. Musculoskeletal: Normal range of motion. Lymphadenopathy:     She has no cervical adenopathy. She has no axillary adenopathy. Neurological: She is alert and oriented to person, place, and time. Skin: Skin is warm and dry. Psychiatric: She has a normal mood and affect. Nursing note and vitals reviewed. ASSESSMENT and PLAN    ICD-10-CM ICD-9-CM    1. Breast cyst, left N60.02 610.0      35 yo female complex cyst left breast.   Birad 3. I agree with follow-up imaging. I will see her back in 3-6 months to assess her. No needle biopsy or excision for now. She was happy with the plan.

## 2019-11-18 NOTE — PATIENT INSTRUCTIONS

## 2019-12-03 DIAGNOSIS — F90.2 ATTENTION DEFICIT HYPERACTIVITY DISORDER (ADHD), COMBINED TYPE: ICD-10-CM

## 2019-12-03 RX ORDER — DEXTROAMPHETAMINE SACCHARATE, AMPHETAMINE ASPARTATE, DEXTROAMPHETAMINE SULFATE AND AMPHETAMINE SULFATE 1.875; 1.875; 1.875; 1.875 MG/1; MG/1; MG/1; MG/1
TABLET ORAL
Qty: 30 TAB | Refills: 0 | Status: SHIPPED | OUTPATIENT
Start: 2019-12-03 | End: 2019-12-23 | Stop reason: SDUPTHER

## 2019-12-03 RX ORDER — DEXTROAMPHETAMINE SACCHARATE, AMPHETAMINE ASPARTATE, DEXTROAMPHETAMINE SULFATE AND AMPHETAMINE SULFATE 1.25; 1.25; 1.25; 1.25 MG/1; MG/1; MG/1; MG/1
TABLET ORAL
Qty: 46 TAB | Refills: 0 | Status: SHIPPED | OUTPATIENT
Start: 2019-12-03 | End: 2019-12-23 | Stop reason: SDUPTHER

## 2019-12-19 DIAGNOSIS — G43.819 OTHER MIGRAINE WITHOUT STATUS MIGRAINOSUS, INTRACTABLE: ICD-10-CM

## 2019-12-19 DIAGNOSIS — F41.8 SITUATIONAL ANXIETY: ICD-10-CM

## 2019-12-19 RX ORDER — CLONAZEPAM 0.5 MG/1
TABLET ORAL
Qty: 10 TAB | Refills: 0 | Status: SHIPPED | OUTPATIENT
Start: 2019-12-19 | End: 2020-02-19 | Stop reason: SDUPTHER

## 2019-12-19 RX ORDER — RIZATRIPTAN BENZOATE 10 MG/1
10 TABLET, ORALLY DISINTEGRATING ORAL
Qty: 12 TAB | Refills: 1 | Status: SHIPPED | OUTPATIENT
Start: 2019-12-19 | End: 2019-12-23 | Stop reason: SDUPTHER

## 2019-12-23 ENCOUNTER — PATIENT MESSAGE (OUTPATIENT)
Dept: INTERNAL MEDICINE CLINIC | Age: 29
End: 2019-12-23

## 2019-12-23 DIAGNOSIS — F90.2 ATTENTION DEFICIT HYPERACTIVITY DISORDER (ADHD), COMBINED TYPE: ICD-10-CM

## 2019-12-23 DIAGNOSIS — G43.819 OTHER MIGRAINE WITHOUT STATUS MIGRAINOSUS, INTRACTABLE: Primary | ICD-10-CM

## 2019-12-23 DIAGNOSIS — G43.819 OTHER MIGRAINE WITHOUT STATUS MIGRAINOSUS, INTRACTABLE: ICD-10-CM

## 2019-12-23 RX ORDER — RIZATRIPTAN BENZOATE 10 MG/1
10 TABLET ORAL
Qty: 10 TAB | Refills: 0 | Status: SHIPPED | OUTPATIENT
Start: 2019-12-23 | End: 2020-09-18 | Stop reason: ALTCHOICE

## 2019-12-23 RX ORDER — DEXTROAMPHETAMINE SACCHARATE, AMPHETAMINE ASPARTATE, DEXTROAMPHETAMINE SULFATE AND AMPHETAMINE SULFATE 1.875; 1.875; 1.875; 1.875 MG/1; MG/1; MG/1; MG/1
TABLET ORAL
Qty: 30 TAB | Refills: 0 | Status: SHIPPED | OUTPATIENT
Start: 2019-12-23 | End: 2019-12-23 | Stop reason: SDUPTHER

## 2019-12-23 RX ORDER — RIZATRIPTAN BENZOATE 10 MG/1
10 TABLET, ORALLY DISINTEGRATING ORAL
Qty: 12 TAB | Refills: 1 | Status: SHIPPED | OUTPATIENT
Start: 2019-12-23 | End: 2020-01-07 | Stop reason: SDUPTHER

## 2019-12-23 RX ORDER — DEXTROAMPHETAMINE SACCHARATE, AMPHETAMINE ASPARTATE, DEXTROAMPHETAMINE SULFATE AND AMPHETAMINE SULFATE 1.25; 1.25; 1.25; 1.25 MG/1; MG/1; MG/1; MG/1
TABLET ORAL
Qty: 46 TAB | Refills: 0 | Status: SHIPPED | OUTPATIENT
Start: 2019-12-23 | End: 2020-01-24 | Stop reason: SDUPTHER

## 2019-12-23 RX ORDER — DEXTROAMPHETAMINE SACCHARATE, AMPHETAMINE ASPARTATE, DEXTROAMPHETAMINE SULFATE AND AMPHETAMINE SULFATE 1.25; 1.25; 1.25; 1.25 MG/1; MG/1; MG/1; MG/1
TABLET ORAL
Qty: 46 TAB | Refills: 0 | Status: SHIPPED | OUTPATIENT
Start: 2019-12-23 | End: 2019-12-23 | Stop reason: SDUPTHER

## 2019-12-23 RX ORDER — DEXTROAMPHETAMINE SACCHARATE, AMPHETAMINE ASPARTATE, DEXTROAMPHETAMINE SULFATE AND AMPHETAMINE SULFATE 1.875; 1.875; 1.875; 1.875 MG/1; MG/1; MG/1; MG/1
TABLET ORAL
Qty: 30 TAB | Refills: 0 | Status: SHIPPED | OUTPATIENT
Start: 2019-12-23 | End: 2020-01-24 | Stop reason: SDUPTHER

## 2019-12-23 NOTE — TELEPHONE ENCOUNTER
From: [place]  Sent: 12/23/2019 1:31 PM EST  To: [facility]ina [place]  Subject: RE: Non-Urgent Medical Question    I have enough of the adderall but I don't have any Rizatriptan left    ----- Message -----  From: Kd Walters LPN  Sent: 17/18/81, 13:31  To: Alana Mcdonald  Subject: RE: Non-Urgent Dennise Stain  Dr. Tessy Clark is out of the office until Friday. Do you have enough medication until then?      ----- Message -----   From: Oleksandr Na: 12/23/2019 1:22 PM EST   To: Marlen Yuan MD  Subject: RE: Non-Urgent Medical Question    I sent my both adderall scrips and Rizatriptan refill requests again as I did not pick them up in [address] I'm in [place] for 2 weeks and would like to pick them I'm here.      [address] [place] [address], [address] [place] [address] [address]  [facility]

## 2020-01-27 DIAGNOSIS — F90.2 ATTENTION DEFICIT HYPERACTIVITY DISORDER (ADHD), COMBINED TYPE: ICD-10-CM

## 2020-01-27 RX ORDER — DEXTROAMPHETAMINE SACCHARATE, AMPHETAMINE ASPARTATE, DEXTROAMPHETAMINE SULFATE AND AMPHETAMINE SULFATE 1.875; 1.875; 1.875; 1.875 MG/1; MG/1; MG/1; MG/1
TABLET ORAL
Qty: 30 TAB | Refills: 0 | Status: SHIPPED | OUTPATIENT
Start: 2020-01-27 | End: 2020-02-26 | Stop reason: SDUPTHER

## 2020-01-27 RX ORDER — DEXTROAMPHETAMINE SACCHARATE, AMPHETAMINE ASPARTATE, DEXTROAMPHETAMINE SULFATE AND AMPHETAMINE SULFATE 1.25; 1.25; 1.25; 1.25 MG/1; MG/1; MG/1; MG/1
TABLET ORAL
Qty: 46 TAB | Refills: 0 | Status: SHIPPED | OUTPATIENT
Start: 2020-01-27 | End: 2020-02-26 | Stop reason: SDUPTHER

## 2020-02-26 ENCOUNTER — OFFICE VISIT (OUTPATIENT)
Dept: INTERNAL MEDICINE CLINIC | Age: 30
End: 2020-02-26

## 2020-02-26 VITALS
WEIGHT: 138.4 LBS | TEMPERATURE: 98.5 F | BODY MASS INDEX: 21.72 KG/M2 | HEIGHT: 67 IN | HEART RATE: 66 BPM | SYSTOLIC BLOOD PRESSURE: 122 MMHG | RESPIRATION RATE: 12 BRPM | DIASTOLIC BLOOD PRESSURE: 76 MMHG | OXYGEN SATURATION: 97 %

## 2020-02-26 DIAGNOSIS — F90.2 ATTENTION DEFICIT HYPERACTIVITY DISORDER (ADHD), COMBINED TYPE: ICD-10-CM

## 2020-02-26 DIAGNOSIS — G43.919 INTRACTABLE MIGRAINE WITHOUT STATUS MIGRAINOSUS, UNSPECIFIED MIGRAINE TYPE: Primary | ICD-10-CM

## 2020-02-26 RX ORDER — DEXTROAMPHETAMINE SACCHARATE, AMPHETAMINE ASPARTATE, DEXTROAMPHETAMINE SULFATE AND AMPHETAMINE SULFATE 1.875; 1.875; 1.875; 1.875 MG/1; MG/1; MG/1; MG/1
TABLET ORAL
Qty: 30 TAB | Refills: 0 | Status: SHIPPED | OUTPATIENT
Start: 2020-02-27 | End: 2020-02-26 | Stop reason: SDUPTHER

## 2020-02-26 RX ORDER — DEXTROAMPHETAMINE SACCHARATE, AMPHETAMINE ASPARTATE, DEXTROAMPHETAMINE SULFATE AND AMPHETAMINE SULFATE 1.25; 1.25; 1.25; 1.25 MG/1; MG/1; MG/1; MG/1
TABLET ORAL
Qty: 60 TAB | Refills: 0 | Status: SHIPPED | OUTPATIENT
Start: 2020-04-27 | End: 2020-06-03 | Stop reason: SDUPTHER

## 2020-02-26 RX ORDER — DEXTROAMPHETAMINE SACCHARATE, AMPHETAMINE ASPARTATE, DEXTROAMPHETAMINE SULFATE AND AMPHETAMINE SULFATE 1.875; 1.875; 1.875; 1.875 MG/1; MG/1; MG/1; MG/1
TABLET ORAL
Qty: 30 TAB | Refills: 0 | Status: SHIPPED | OUTPATIENT
Start: 2020-03-27 | End: 2020-02-26 | Stop reason: SDUPTHER

## 2020-02-26 RX ORDER — DEXTROAMPHETAMINE SACCHARATE, AMPHETAMINE ASPARTATE, DEXTROAMPHETAMINE SULFATE AND AMPHETAMINE SULFATE 1.25; 1.25; 1.25; 1.25 MG/1; MG/1; MG/1; MG/1
TABLET ORAL
Qty: 60 TAB | Refills: 0 | Status: SHIPPED | OUTPATIENT
Start: 2020-03-27 | End: 2020-02-26 | Stop reason: SDUPTHER

## 2020-02-26 RX ORDER — DEXTROAMPHETAMINE SACCHARATE, AMPHETAMINE ASPARTATE, DEXTROAMPHETAMINE SULFATE AND AMPHETAMINE SULFATE 1.875; 1.875; 1.875; 1.875 MG/1; MG/1; MG/1; MG/1
TABLET ORAL
Qty: 30 TAB | Refills: 0 | Status: SHIPPED | OUTPATIENT
Start: 2020-04-27 | End: 2020-06-03 | Stop reason: ALTCHOICE

## 2020-02-26 RX ORDER — DEXTROAMPHETAMINE SACCHARATE, AMPHETAMINE ASPARTATE, DEXTROAMPHETAMINE SULFATE AND AMPHETAMINE SULFATE 1.25; 1.25; 1.25; 1.25 MG/1; MG/1; MG/1; MG/1
TABLET ORAL
Qty: 60 TAB | Refills: 0 | Status: SHIPPED | OUTPATIENT
Start: 2020-02-27 | End: 2020-02-26 | Stop reason: SDUPTHER

## 2020-02-26 NOTE — PROGRESS NOTES
Chief Complaint   Patient presents with    Medication Evaluation     Patient presents to follow-up with regards to her attention deficit medicine. 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 She is looking to get a job in [place]. She is looking to work in the [employer] prior. Patient has friends in [place] who have encouraged her to come out to work there. Situational stress  She has broke up with her boyfriend of 10 years. Migraine  Patient reports that her migraines have actually improved since her break-up with her boyfriend. Attention deficit  Patient has been taking Adderall 7.5 mg in the morning and uses the 5 mg tablet at lunch if needed and if working late shifts may need additional 5 mg. She reports this regimen has been working for her. She may not need to take the second 5 mg pill if not needed as it will depend on her workflow in [place]. Past Medical History:   Diagnosis Date    Anxiety     prn clonazepam    Headache     controlled with magnesium and prn maxalt     History reviewed. No pertinent surgical history. Social History     Socioeconomic History    Marital status: SINGLE     Spouse name: Not on file    Number of children: Not on file    Years of education: Not on file    Highest education level: Not on file   Tobacco Use    Smoking status: Never Smoker    Smokeless tobacco: Never Used   Substance and Sexual Activity    Alcohol use:  Yes     Alcohol/week: 3.0 standard drinks     Types: 1 Glasses of wine, 1 Cans of beer, 1 Shots of liquor per week     Comment: occasional weekend    Drug use: No    Sexual activity: Yes     Partners: Male     Birth control/protection: Condom     Comment: followed by [facility]   Social History Narrative    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[place], clinicals through [employer]        No kids    Boyfriend, Louie Friend Marta X 9 years     Family History   Problem Relation Age of Onset    Diabetes Mother         prediabetic    Hypertension Mother     Arthritis-osteo Father     Hypertension Father     Cancer Maternal Grandmother         lung    Breast Cancer Other     Heart Disease Neg Hx     Heart Attack Neg Hx      Current Outpatient Medications   Medication Sig Dispense Refill    albuterol (PROVENTIL HFA, VENTOLIN HFA, PROAIR HFA) 90 mcg/actuation inhaler Take 1 Puff by inhalation every four (4) hours as needed for Wheezing. 1 Inhaler 6    clonazePAM (KLONOPIN) 0.5 mg tablet Take 1/2 to 1 tab daily ONLY as needed. 10 Tab 0    dextroamphetamine-amphetamine (ADDERALL) 5 mg tablet Take 1 tab at lunch and if needed take an additional tab at 4 pm  Indications: attention deficit disorder with hyperactivity 46 Tab 0    dextroamphetamine-amphetamine (ADDERALL) 7.5 mg tablet Take 1 tab po in the morning 30 Tab 0    calcium-cholecalciferol, d3, (CALCIUM 600 + D) 600-125 mg-unit tab Take 1,000 mg by mouth.  zinc sulfate (ZINC-15 PO) Take  by mouth.  L-THREONINE 150 mg by Does Not Apply route.  ascorbic acid, vitamin C, (VITAMIN C) 500 mg tablet Take  by mouth.  ibuprofen (MOTRIN) 600 mg tablet Take  by mouth every six (6) hours as needed for Pain.  magnesium gluconate 500 mg (27 mg  elemental) tablet Take 1 Tab by mouth daily. 90 Tab 1    MULTIVITAMIN PO Take 1 Tab by mouth daily.  rizatriptan (MAXALT-MLT) 10 mg disintegrating tablet Take 1 Tab by mouth once as needed for Migraine for up to 1 dose. 12 Tab 5    rizatriptan (MAXALT) 10 mg tablet Take 1 Tab by mouth once as needed for Migraine for up to 1 dose. 10 Tab 0    rizatriptan (MAXALT) 10 mg tablet Take 1 Tab by mouth once as needed for Migraine for up to 1 dose.  May repeat in 2 hours if needed 20 Tab 0     Allergies   Allergen Reactions    Dilaudid [Hydromorphone] Hives       Review of Systems - General ROS: negative for - chills, fatigue, fever, hot flashes or malaise  Cardiovascular ROS: no chest pain or dyspnea on exertion  Respiratory ROS: no cough, shortness of breath, or wheezing    Visit Vitals  /76 (BP 1 Location: Left arm, BP Patient Position: Sitting)   Pulse 66   Temp 98.5 °F (36.9 °C) (Oral)   Resp 12   Ht 5' 7\" (1.702 m)   Wt 138 lb 6.4 oz (62.8 kg)   SpO2 97%   BMI 21.68 kg/m²     General Appearance:  Well developed, well nourished,alert and oriented x 3, and individual in no acute distress. Ears/Nose/Mouth/Throat:   Hearing grossly normal.  Right breast: Fibrocystic changes of right upper outer quadrant no discrete nodules, no axillary lymphadenopathy no supraclavicular lymphadenopathy left breast increase in fibrocystic changes of the left upper quadrant slightly greater than right, 4 mm BB nodule behind the areola, no axillary or supra clavicular lymphadenopathy       Neck: Supple, no lad, no bruits   Chest:   Lungs clear to auscultation bilaterally. Cardiovascular:  Regular rate and rhythm, S1, S2 normal, no murmur. Abdomen:   Soft, non-tender, bowel sounds are active. Extremities: No edema bilaterally. Skin: Warm and dry, no suspicious lesions                 Diagnoses and all orders for this visit:    1. Intractable migraine without status migrainosus, unspecified migraine type  Continue Maxalt as needed    2. Attention deficit hyperactivity disorder (ADHD), combined type  Continue Adderall. Patient will be starting new job and important for her to remain on Adderall until she is more stabilized. She will find a primary care doctor in [place] within 3 months if possible. -     dextroamphetamine-amphetamine (ADDERALL) 5 mg tablet; Take 1 tab at lunch and if needed take an additional tab at 4 pm  Indications: attention deficit disorder with hyperactivity  -     dextroamphetamine-amphetamine (ADDERALL) 7.5 mg tablet;  Take 1 tab po in the morning

## 2020-03-17 DIAGNOSIS — G43.819 OTHER MIGRAINE WITHOUT STATUS MIGRAINOSUS, INTRACTABLE: ICD-10-CM

## 2020-03-17 RX ORDER — RIZATRIPTAN BENZOATE 10 MG/1
10 TABLET, ORALLY DISINTEGRATING ORAL
Qty: 9 TAB | Refills: 0 | Status: SHIPPED | OUTPATIENT
Start: 2020-03-17 | End: 2020-05-24 | Stop reason: SDUPTHER

## 2020-03-17 RX ORDER — RIZATRIPTAN BENZOATE 10 MG/1
10 TABLET, ORALLY DISINTEGRATING ORAL
Qty: 9 TAB | Refills: 0 | Status: SHIPPED | OUTPATIENT
Start: 2020-03-17 | End: 2020-03-17 | Stop reason: ALTCHOICE

## 2020-05-24 DIAGNOSIS — G43.819 OTHER MIGRAINE WITHOUT STATUS MIGRAINOSUS, INTRACTABLE: ICD-10-CM

## 2020-05-24 RX ORDER — RIZATRIPTAN BENZOATE 10 MG/1
10 TABLET, ORALLY DISINTEGRATING ORAL
Qty: 9 TAB | Refills: 0 | Status: SHIPPED | OUTPATIENT
Start: 2020-05-24 | End: 2020-07-21 | Stop reason: SDUPTHER

## 2020-06-02 ENCOUNTER — TELEPHONE (OUTPATIENT)
Dept: INTERNAL MEDICINE CLINIC | Age: 30
End: 2020-06-02

## 2020-06-02 NOTE — TELEPHONE ENCOUNTER
----- Message from Sariah Cole sent at 6/2/2020  2:15 PM EDT -----  Regarding: Dr. Richard Ambriz Message/Vendor Calls    Caller's first and last name: Purvi Chaidez      Reason for call: pre employment form filled out      Callback required yes/no and why:yes      Best contact number(s):  [phone]    Details to clarify the request: please return the patients call      Sariah Cole

## 2020-06-03 ENCOUNTER — VIRTUAL VISIT (OUTPATIENT)
Dept: INTERNAL MEDICINE CLINIC | Age: 30
End: 2020-06-03

## 2020-06-03 VITALS — TEMPERATURE: 98.2 F | HEART RATE: 76 BPM | BODY MASS INDEX: 21.68 KG/M2 | HEIGHT: 67 IN

## 2020-06-03 DIAGNOSIS — F90.2 ATTENTION DEFICIT HYPERACTIVITY DISORDER (ADHD), COMBINED TYPE: ICD-10-CM

## 2020-06-03 DIAGNOSIS — G43.819 OTHER MIGRAINE WITHOUT STATUS MIGRAINOSUS, INTRACTABLE: ICD-10-CM

## 2020-06-03 DIAGNOSIS — F41.8 SITUATIONAL ANXIETY: ICD-10-CM

## 2020-06-03 DIAGNOSIS — F43.9 SITUATIONAL STRESS: Primary | ICD-10-CM

## 2020-06-03 RX ORDER — CLONAZEPAM 0.5 MG/1
TABLET ORAL
Qty: 10 TAB | Refills: 0 | Status: SHIPPED | OUTPATIENT
Start: 2020-06-03 | End: 2020-09-18 | Stop reason: SDUPTHER

## 2020-06-03 RX ORDER — DEXTROAMPHETAMINE SACCHARATE, AMPHETAMINE ASPARTATE, DEXTROAMPHETAMINE SULFATE AND AMPHETAMINE SULFATE 1.25; 1.25; 1.25; 1.25 MG/1; MG/1; MG/1; MG/1
TABLET ORAL
Qty: 150 TAB | Refills: 0 | Status: SHIPPED | OUTPATIENT
Start: 2020-06-03 | End: 2020-06-05

## 2020-06-03 RX ORDER — RIZATRIPTAN BENZOATE 10 MG/1
10 TABLET ORAL
COMMUNITY
End: 2020-09-18 | Stop reason: ALTCHOICE

## 2020-06-03 NOTE — PROGRESS NOTES
Consent: Wagner Witt, who was seen by synchronous (real-time) audio-video technology, and/or her healthcare decision maker, is aware that this patient-initiated, Telehealth encounter on 6/3/2020 is a billable service, with coverage as determined by her insurance carrier. She is aware that she may receive a bill and has provided verbal consent to proceed: YES      712  Assessment & Plan:   Diagnoses and all orders for this visit:    1. Situational stress  Patient reports episodic periods of increased stress and is taking clonazepam as needed. Discussed with patient most ideal to not be using this medication. She notes the BuSpar did not work for her. I discussed with her potential using gabapentin at night. She will get established with her work and then eventually will see psychiatry for medication management. She is agreeable to potentially paying out of pocket if needed. Will refill clonazepam for now. There are no signs of abuse of medication. 2. Attention deficit hyperactivity disorder (ADHD), combined type  Not optimally controlled  We will increase her Adderall to 10 mg in the morning and 5 mg in the evening. She will hold her 7.5 mg of Adderall and not use  -     dextroamphetamine-amphetamine (ADDERALL) 5 mg tablet; Take 2 tablets p.o. at 8 AM and 1 tablet p.o. at 3:30 PM  Indications: attention deficit disorder with hyperactivity    3. Other migraine without status migrainosus, intractable  Stable continue Maxalt              Subjective:   Wagner Witt is a 27 y.o. female who was seen for Complete Physical (form completion)    Situational stress  Patient reports that she has left [place] and is transitioning to a new job in [employer]. 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 She reports her mental health is overall very good.   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      Attention deficit  Patient reports that she does not think her Adderall is optimally at the right dose. She notes that she is having some breakthrough toward the end of 6 hours. She is interested in potentially adding an additional 5 mg in the early afternoon. She is starting a job where she is doing 12-hour shifts. She feels her current regimen of 5 mg in the morning and seven-point 5 in the evening may not last through her shift. She will start her job in a week. Migraines  She reports she received her prescription for her Maxalt. She has not had one in a month. She thinks her headaches may be related to stress. Letter  Clonazepam for situaional stress    Current Outpatient Medications   Medication Sig    rizatriptan (Maxalt) 10 mg tablet Take 10 mg by mouth once as needed for Migraine. May repeat in 2 hours if needed    dextroamphetamine-amphetamine (ADDERALL) 5 mg tablet Take 1 tab at lunch and if needed take an additional tab at 4 pm  Indications: attention deficit disorder with hyperactivity    dextroamphetamine-amphetamine (ADDERALL) 7.5 mg tablet Take 1 tab po in the morning    albuterol (PROVENTIL HFA, VENTOLIN HFA, PROAIR HFA) 90 mcg/actuation inhaler Take 1 Puff by inhalation every four (4) hours as needed for Wheezing.  clonazePAM (KLONOPIN) 0.5 mg tablet Take 1/2 to 1 tab daily ONLY as needed.  calcium-cholecalciferol, d3, (CALCIUM 600 + D) 600-125 mg-unit tab Take 1,000 mg by mouth.  zinc sulfate (ZINC-15 PO) Take  by mouth.  ascorbic acid, vitamin C, (VITAMIN C) 500 mg tablet Take  by mouth.  ibuprofen (MOTRIN) 600 mg tablet Take  by mouth every six (6) hours as needed for Pain.  magnesium gluconate 500 mg (27 mg  elemental) tablet Take 1 Tab by mouth daily.  MULTIVITAMIN PO Take 1 Tab by mouth daily.  rizatriptan (MAXALT-MLT) 10 mg disintegrating tablet Take 1 Tab by mouth once as needed for Migraine for up to 1 dose.     rizatriptan (MAXALT) 10 mg tablet Take 1 Tab by mouth once as needed for Migraine for up to 1 dose.  rizatriptan (MAXALT) 10 mg tablet Take 1 Tab by mouth once as needed for Migraine for up to 1 dose. May repeat in 2 hours if needed    L-THREONINE 150 mg by Does Not Apply route. No current facility-administered medications for this visit.         Allergies   Allergen Reactions    Dilaudid [Hydromorphone] Hives     Subjective    Past Medical History:   Diagnosis Date    Anxiety     prn clonazepam    Headache     controlled with magnesium and prn maxalt       ROS  All other systems reviewed and negative, unless mentioned in HPI    Objective:   Vital Signs: (As obtained by patient/caregiver at home)  Visit Vitals  Pulse 76   Temp 98.2 °F (36.8 °C) (Oral)   Ht 5' 7\" (1.702 m)   LMP 05/31/2020   BMI 21.68 kg/m²        [INSTRUCTIONS:  \"[x]\" Indicates a positive item  \"[]\" Indicates a negative item  -- DELETE ALL ITEMS NOT EXAMINED]    Constitutional: [x] Appears well-developed and well-nourished [x] No apparent distress      [] Abnormal -     Mental status: [x] Alert and awake  [x] Oriented to person/place/time [x] Able to follow commands    [] Abnormal -     Eyes:   EOM    [x]  Normal    [] Abnormal -   Sclera  [x]  Normal    [] Abnormal -          Discharge [x]  None visible   [] Abnormal -     HENT: [x] Normocephalic, atraumatic  [] Abnormal -   [x] Mouth/Throat: Mucous membranes are moist    External Ears [x] Normal  [] Abnormal -    Neck: [x] No visualized mass [] Abnormal -     Pulmonary/Chest: [x] Respiratory effort normal   [x] No visualized signs of difficulty breathing or respiratory distress        [] Abnormal -      Musculoskeletal:   [x] Normal gait with no signs of ataxia         [x] Normal range of motion of neck        [] Abnormal -     Neurological:        [x] No Facial Asymmetry (Cranial nerve 7 motor function) (limited exam due to video visit)          [x] No gaze palsy        [] Abnormal -          Skin:        [x] No significant exanthematous lesions or discoloration noted on facial skin         [] Abnormal -            Psychiatric:       [x] Normal Affect [] Abnormal -        [x] No Hallucinations    Other pertinent observable physical exam findings:-      We discussed the expected course, resolution and complications of the diagnosis(es) in detail. Medication risks, benefits, costs, interactions, and alternatives were discussed as indicated. I advised her to contact the office if her condition worsens, changes or fails to improve as anticipated. She expressed understanding with the diagnosis(es) and plan. Hailey Catalan is a 27 y.o. female being evaluated by a video visit encounter for concerns as above. A caregiver was present when appropriate. Due to this being a TeleHealth encounter (During [place] [address] public health emergency), evaluation of the following organ systems was limited: Vitals/Constitutional/EENT/Resp/CV/GI//MS/Neuro/Skin/Heme-Lymph-Imm. Pursuant to the emergency declaration under the [facility], 1135 waiver authority and the [employer] Act, this Virtual  Visit was conducted, with patient's (and/or legal guardian's) consent, to reduce the patient's risk of exposure to COVID-19 and provide necessary medical care. Services were provided through a video synchronous discussion virtually to substitute for in-person clinic visit.      I was in the office and the patient was in the airport    Amy Culver MD

## 2020-06-03 NOTE — LETTER
NOTIFICATION RETURN TO WORK / SCHOOL 
 
6/3/2020 5:51 PM 
 
Ms. Casper Hall [address] [place] [address] [phone] To Whom It May Concern: 
 
Casper Hall is currently under the care of [address] [place] [address],[address]. Ms. Janalee Lesches has been a patient of mine since December 2014. She has been diagnosed with situational stress and attention deficit. She is currently taking clonazepam as needed as well as Adderall. She is very stable on these medications. If there are questions or concerns please have the patient contact our office. Sincerely, Rajiv Luis MD

## 2020-06-05 DIAGNOSIS — F90.2 ATTENTION DEFICIT HYPERACTIVITY DISORDER (ADHD), COMBINED TYPE: ICD-10-CM

## 2020-06-05 RX ORDER — DEXTROAMPHETAMINE SACCHARATE, AMPHETAMINE ASPARTATE, DEXTROAMPHETAMINE SULFATE AND AMPHETAMINE SULFATE 1.25; 1.25; 1.25; 1.25 MG/1; MG/1; MG/1; MG/1
TABLET ORAL
Qty: 120 TAB | Refills: 0 | Status: SHIPPED | OUTPATIENT
Start: 2020-06-05 | End: 2020-07-21 | Stop reason: SDUPTHER

## 2020-07-22 ENCOUNTER — DOCUMENTATION ONLY (OUTPATIENT)
Dept: INTERNAL MEDICINE CLINIC | Age: 30
End: 2020-07-22

## 2020-07-24 DIAGNOSIS — F90.2 ATTENTION DEFICIT HYPERACTIVITY DISORDER (ADHD), COMBINED TYPE: ICD-10-CM

## 2020-07-24 RX ORDER — DEXTROAMPHETAMINE SACCHARATE, AMPHETAMINE ASPARTATE, DEXTROAMPHETAMINE SULFATE AND AMPHETAMINE SULFATE 1.25; 1.25; 1.25; 1.25 MG/1; MG/1; MG/1; MG/1
TABLET ORAL
Qty: 120 TAB | Refills: 0 | Status: SHIPPED | OUTPATIENT
Start: 2020-07-24 | End: 2020-09-18

## 2020-09-18 ENCOUNTER — OFFICE VISIT (OUTPATIENT)
Dept: INTERNAL MEDICINE CLINIC | Age: 30
End: 2020-09-18
Payer: COMMERCIAL

## 2020-09-18 VITALS
BODY MASS INDEX: 22.03 KG/M2 | OXYGEN SATURATION: 100 % | RESPIRATION RATE: 14 BRPM | TEMPERATURE: 98.1 F | DIASTOLIC BLOOD PRESSURE: 73 MMHG | SYSTOLIC BLOOD PRESSURE: 117 MMHG | WEIGHT: 140.4 LBS | HEART RATE: 67 BPM | HEIGHT: 67 IN

## 2020-09-18 DIAGNOSIS — Z00.00 ANNUAL PHYSICAL EXAM: Primary | ICD-10-CM

## 2020-09-18 DIAGNOSIS — Z00.00 ANNUAL PHYSICAL EXAM: ICD-10-CM

## 2020-09-18 DIAGNOSIS — G43.819 OTHER MIGRAINE WITHOUT STATUS MIGRAINOSUS, INTRACTABLE: ICD-10-CM

## 2020-09-18 DIAGNOSIS — N63.20 LEFT BREAST MASS: ICD-10-CM

## 2020-09-18 DIAGNOSIS — F41.8 SITUATIONAL ANXIETY: ICD-10-CM

## 2020-09-18 DIAGNOSIS — F90.2 ATTENTION DEFICIT HYPERACTIVITY DISORDER (ADHD), COMBINED TYPE: Primary | ICD-10-CM

## 2020-09-18 LAB
ALBUMIN SERPL-MCNC: 4.4 G/DL (ref 3.5–5)
ALBUMIN/GLOB SERPL: 1.2 {RATIO} (ref 1.1–2.2)
ALP SERPL-CCNC: 46 U/L (ref 45–117)
ALT SERPL-CCNC: 16 U/L (ref 12–78)
ANION GAP SERPL CALC-SCNC: 4 MMOL/L (ref 5–15)
AST SERPL-CCNC: 12 U/L (ref 15–37)
BILIRUB SERPL-MCNC: 0.7 MG/DL (ref 0.2–1)
BUN SERPL-MCNC: 16 MG/DL (ref 6–20)
BUN/CREAT SERPL: 21 (ref 12–20)
CALCIUM SERPL-MCNC: 9.5 MG/DL (ref 8.5–10.1)
CHLORIDE SERPL-SCNC: 103 MMOL/L (ref 97–108)
CHOLEST SERPL-MCNC: 116 MG/DL
CO2 SERPL-SCNC: 29 MMOL/L (ref 21–32)
CREAT SERPL-MCNC: 0.76 MG/DL (ref 0.55–1.02)
GLOBULIN SER CALC-MCNC: 3.6 G/DL (ref 2–4)
GLUCOSE SERPL-MCNC: 93 MG/DL (ref 65–100)
HDLC SERPL-MCNC: 61 MG/DL
HDLC SERPL: 1.9 {RATIO} (ref 0–5)
LDLC SERPL CALC-MCNC: 45.6 MG/DL (ref 0–100)
LIPID PROFILE,FLP: NORMAL
POTASSIUM SERPL-SCNC: 3.8 MMOL/L (ref 3.5–5.1)
PROT SERPL-MCNC: 8 G/DL (ref 6.4–8.2)
SODIUM SERPL-SCNC: 136 MMOL/L (ref 136–145)
TRIGL SERPL-MCNC: 47 MG/DL (ref ?–150)
TSH SERPL DL<=0.05 MIU/L-ACNC: 1.91 UIU/ML (ref 0.36–3.74)
VLDLC SERPL CALC-MCNC: 9.4 MG/DL

## 2020-09-18 PROCEDURE — 99214 OFFICE O/P EST MOD 30 MIN: CPT | Performed by: INTERNAL MEDICINE

## 2020-09-18 RX ORDER — DEXTROAMPHETAMINE SACCHARATE, AMPHETAMINE ASPARTATE, DEXTROAMPHETAMINE SULFATE AND AMPHETAMINE SULFATE 1.875; 1.875; 1.875; 1.875 MG/1; MG/1; MG/1; MG/1
7.5 TABLET ORAL
Qty: 72 TAB | Refills: 0 | Status: SHIPPED | OUTPATIENT
Start: 2020-09-18 | End: 2020-12-04 | Stop reason: SDUPTHER

## 2020-09-18 RX ORDER — RIZATRIPTAN BENZOATE 10 MG/1
10 TABLET, ORALLY DISINTEGRATING ORAL
Qty: 9 TAB | Refills: 0 | Status: SHIPPED | OUTPATIENT
Start: 2020-09-18 | End: 2020-09-18 | Stop reason: SDUPTHER

## 2020-09-18 RX ORDER — DEXTROAMPHETAMINE SACCHARATE, AMPHETAMINE ASPARTATE, DEXTROAMPHETAMINE SULFATE AND AMPHETAMINE SULFATE 1.25; 1.25; 1.25; 1.25 MG/1; MG/1; MG/1; MG/1
5 TABLET ORAL DAILY
Qty: 90 TAB | Refills: 0 | Status: SHIPPED | OUTPATIENT
Start: 2020-09-18 | End: 2020-12-04 | Stop reason: SDUPTHER

## 2020-09-18 RX ORDER — RIZATRIPTAN BENZOATE 10 MG/1
10 TABLET, ORALLY DISINTEGRATING ORAL
Qty: 9 TAB | Refills: 0 | Status: SHIPPED | OUTPATIENT
Start: 2020-09-18 | End: 2020-11-05 | Stop reason: SDUPTHER

## 2020-09-18 RX ORDER — CLONAZEPAM 0.5 MG/1
TABLET ORAL
Qty: 8 TAB | Refills: 0 | Status: SHIPPED | OUTPATIENT
Start: 2020-09-18 | End: 2020-12-04 | Stop reason: SDUPTHER

## 2020-09-18 NOTE — PROGRESS NOTES
Diagnoses and all orders for this visit:    1. Attention deficit hyperactivity disorder (ADHD), combined type  Stable  No suspicious activity with regards to her Adderall use  [facility]  -     dextroamphetamine-amphetamine (ADDERALL) 7.5 mg tablet; Take 1 Tab by mouth six (6) days a week. Max Daily Amount: 7.5 mg. In the am  -     dextroamphetamine-amphetamine (ADDERALL) 5 mg tablet; Take 1 Tab by mouth daily. Max Daily Amount: 5 mg. Indications: attention deficit disorder with hyperactivity    2. Left breast mass  Patient needs follow-up  She has got her insurance so we will do  -     US BREAST LT COMPLETE 4 QUAD; Future    3. Situational anxiety  Overall controlled  She is doing her anxiety breathing techniques and will see Merlinda Daring  Monitor use  -     clonazePAM (KlonoPIN) 0.5 mg tablet; Take 1/2 to 1 tab daily ONLY as needed. 4. Other migraine without status migrainosus, intractable  Stable but may be triggered by night shifts  We will monitor  -     rizatriptan (MAXALT-MLT) 10 mg disintegrating tablet; Take 1 Tab by mouth once as needed for Migraine for up to 1 dose. 5.   Did her feet well today  -     TSH 3RD GENERATION  -     LIPID PANEL  -     METABOLIC PANEL, COMPREHENSIVE       Pt's waist size is 32 inches    Chief Complaint   Patient presents with    Follow-up     Be Well visit. Referral - genetics.  Medication Evaluation     Patient presents to follow-up from her last virtual visit. 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 Migraines  Nervous about migraines with nights with new PICU work  Nights fine but next day  Migraine pill during shift      Patient reports she needs to be well visit. She will send me the form and we will fill out.       Attention deficit  Patient reports that she is using Adderall 7.5 mg 5 days of the week. She uses Adderall 5 mg in the afternoon and occasionally uses 2.5 or 5 mg in the evening. She reports this regimen has been working well for her. She has not had any weight loss. She has not had any interference with her sleep. Anxiety  Patient reports that she has been using clonazepam very seldomly. She likes to know that it is around. She is using about 1 to 2 tablets a month. Past Medical History:   Diagnosis Date    Anxiety     prn clonazepam    Headache     controlled with magnesium and prn maxalt     History reviewed. No pertinent surgical history. Social History     Socioeconomic History    Marital status: SINGLE     Spouse name: Not on file    Number of children: Not on file    Years of education: Not on file    Highest education level: Not on file   Tobacco Use    Smoking status: Never Smoker    Smokeless tobacco: Never Used   Substance and Sexual Activity    Alcohol use:  Yes     Alcohol/week: 3.0 standard drinks     Types: 1 Glasses of wine, 1 Cans of beer, 1 Shots of liquor per week     Comment: occasional weekend    Drug use: No    Sexual activity: Yes     Partners: Male     Birth control/protection: Condom     Comment: followed by [place] physicians   Social History Narrative    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 at 8 AM and 1 tablet p.o at noon and 1 tab  at 3:30  Tab 0    clonazePAM (KlonoPIN) 0.5 mg tablet Take 1/2 to 1 tab daily ONLY as needed. 10 Tab 0    albuterol (PROVENTIL HFA, VENTOLIN HFA, PROAIR HFA) 90 mcg/actuation inhaler Take 1 Puff by inhalation every four (4) hours as needed for Wheezing. 1 Inhaler 6    calcium-cholecalciferol, d3, (CALCIUM 600 + D) 600-125 mg-unit tab Take 1,000 mg by mouth.  zinc sulfate (ZINC-15 PO) Take  by mouth.  ascorbic acid, vitamin C, (VITAMIN C) 500 mg tablet Take  by mouth.  ibuprofen (MOTRIN) 600 mg tablet Take  by mouth every six (6) hours as needed for Pain.  magnesium gluconate 500 mg (27 mg  elemental) tablet Take 1 Tab by mouth daily. 90 Tab 1    rizatriptan (MAXALT-MLT) 10 mg disintegrating tablet Take 1 Tab by mouth once as needed for Migraine for up to 1 dose. 9 Tab 0    rizatriptan (Maxalt) 10 mg tablet Take 10 mg by mouth once as needed for Migraine. May repeat in 2 hours if needed      rizatriptan (MAXALT) 10 mg tablet Take 1 Tab by mouth once as needed for Migraine for up to 1 dose. 10 Tab 0    rizatriptan (MAXALT) 10 mg tablet Take 1 Tab by mouth once as needed for Migraine for up to 1 dose. May repeat in 2 hours if needed 20 Tab 0    L-THREONINE 150 mg by Does Not Apply route.  MULTIVITAMIN PO Take 1 Tab by mouth daily. Allergies   Allergen Reactions    Dilaudid [Hydromorphone] Hives       Review of Systems - General ROS: negative for - chills, fatigue, fever or hot flashes  Cardiovascular ROS: no chest pain or dyspnea on exertion  Respiratory ROS: no cough, shortness of breath, or wheezing    Visit Vitals  /73 (BP 1 Location: Left arm, BP Patient Position: Sitting)   Pulse 67   Temp 98.1 °F (36.7 °C) (Oral)   Resp 14   Ht 5' 7\" (1.702 m)   Wt 140 lb 6.4 oz (63.7 kg)   LMP 09/10/2020   SpO2 100%   BMI 21.99 kg/m²     General Appearance:  Well developed, well nourished,alert and oriented x 3, and individual in no acute distress.    Ears/Nose/Mouth/Throat:   Hearing grossly normal.         Neck: Supple, no lad, no bruits   Chest:   Lungs clear to auscultation bilaterally. Cardiovascular:  Regular rate and rhythm, S1, S2 normal, no murmur. Abdomen:   Soft, non-tender, bowel sounds are active. 32 inches   Extremities: No edema bilaterally.     Skin: Warm and dry, no suspicious lesions

## 2020-09-21 ENCOUNTER — PATIENT MESSAGE (OUTPATIENT)
Dept: INTERNAL MEDICINE CLINIC | Age: 30
End: 2020-09-21

## 2020-09-21 DIAGNOSIS — Z12.83 SKIN CANCER SCREENING: Primary | ICD-10-CM

## 2020-09-23 RX ORDER — TRIAMCINOLONE ACETONIDE 0.25 MG/G
CREAM TOPICAL 2 TIMES DAILY
Qty: 15 G | Refills: 0 | Status: SHIPPED | OUTPATIENT
Start: 2020-09-23 | End: 2021-01-04 | Stop reason: ALTCHOICE

## 2020-09-25 DIAGNOSIS — Z20.2 STD EXPOSURE: Primary | ICD-10-CM

## 2020-10-05 NOTE — TELEPHONE ENCOUNTER
From: Pippa Angelo  Sent: 10/5/2020 7:08 AM EDT  To: [facility]  Subject: RE: Non-Urgent Evon Howe! My eye improved with a week of the steroid cream. However, it flared up again yesterday & is slowly getting worse again. Can you do a referral to Dr. Viki Nguyen at [facility]? I saw her once for a mole and liked her. Thanks    ----- Message -----  From: Roxie Tyson MD  Sent: 9/23/20, 16:06  To: Alana Trevizo  Subject: RE: Non-Urgent Medical Question    Hi Ms. José Miguel Prieto to hear you are not improving. I sent in a very low dose of steroid to your pharmacy. If it is not getting better within a week please let me know. We may need to send you to dermatology. Thank you and take care.      ----- Message -----   From:Alana Trevizo   Sent:9/23/2020 12:30 PM EDT    To:Gia Valdivia MD   Subject:RE: Non-Urgent Medical Question    Thank you. My left eyelid that I showed you at my appointment has continued to be swollen/red/dry. It's becoming painful now. I'm applying the Vaseline like you suggested, but no relief. Is there anything else I can do?      ----- Message -----   From:Gia Valdivia MD   EVFK:4/01/2499 6:35 PM EDT   To: Alana Trevizo   Subject:RE: Non-Urgent Medical Question    I tried to enter Be well as the visit. I will forward this to our  to see if she can fix. I will send you a letter with regards to your labs.      ----- Message -----   From:Alana Trevizo   Sent:9/21/2020 11:02 AM EDT   To:Gia Valdivia MD   Subject:Non-Urgent Medical Question    Hi-- I signed into [facility] assessment and it says with a [facility] provider, a form does not need to be completed. If the appointment was marked as a [facility] visit, it will transfer from [address] to be well. Waist circumference is 32\". I also wanted to see if you had a chance to review my labs. Some were slightly off.  As I had mentioned, I have bladder emptying issues/urgency/frequency. Urgency/frequency prompted UA with normal results since my teen years (& with you as PCP, previous visits).     Thanks, [facility] [employer]

## 2020-10-08 LAB
C TRACH RRNA SPEC QL NAA+PROBE: NEGATIVE
HIV 1+2 AB+HIV1 P24 AG SERPL QL IA: NON REACTIVE
N GONORRHOEA RRNA SPEC QL NAA+PROBE: NEGATIVE
T VAGINALIS DNA SPEC QL NAA+PROBE: NEGATIVE

## 2020-10-12 DIAGNOSIS — Z20.2 STD EXPOSURE: Primary | ICD-10-CM

## 2020-11-05 ENCOUNTER — APPOINTMENT (OUTPATIENT)
Dept: INTERNAL MEDICINE CLINIC | Age: 30
End: 2020-11-05

## 2020-11-05 DIAGNOSIS — Z20.2 STD EXPOSURE: ICD-10-CM

## 2020-11-05 DIAGNOSIS — G43.819 OTHER MIGRAINE WITHOUT STATUS MIGRAINOSUS, INTRACTABLE: ICD-10-CM

## 2020-11-05 RX ORDER — RIZATRIPTAN BENZOATE 10 MG/1
10 TABLET, ORALLY DISINTEGRATING ORAL
Qty: 9 TAB | Refills: 0 | Status: SHIPPED | OUTPATIENT
Start: 2020-11-05 | End: 2021-01-04 | Stop reason: SDUPTHER

## 2020-11-06 LAB — RPR SER QL: NONREACTIVE

## 2020-12-15 DIAGNOSIS — F90.2 ATTENTION DEFICIT HYPERACTIVITY DISORDER (ADHD), COMBINED TYPE: ICD-10-CM

## 2020-12-15 RX ORDER — DEXTROAMPHETAMINE SACCHARATE, AMPHETAMINE ASPARTATE, DEXTROAMPHETAMINE SULFATE AND AMPHETAMINE SULFATE 1.25; 1.25; 1.25; 1.25 MG/1; MG/1; MG/1; MG/1
5 TABLET ORAL DAILY
Qty: 90 TAB | Refills: 0 | Status: SHIPPED | OUTPATIENT
Start: 2020-12-15 | End: 2021-01-28 | Stop reason: SDUPTHER

## 2020-12-15 RX ORDER — DEXTROAMPHETAMINE SACCHARATE, AMPHETAMINE ASPARTATE, DEXTROAMPHETAMINE SULFATE AND AMPHETAMINE SULFATE 1.875; 1.875; 1.875; 1.875 MG/1; MG/1; MG/1; MG/1
7.5 TABLET ORAL
Qty: 72 TAB | Refills: 0 | Status: SHIPPED | OUTPATIENT
Start: 2020-12-15 | End: 2021-01-28 | Stop reason: SDUPTHER

## 2020-12-21 ENCOUNTER — TELEPHONE (OUTPATIENT)
Dept: INTERNAL MEDICINE CLINIC | Age: 30
End: 2020-12-21

## 2020-12-21 NOTE — TELEPHONE ENCOUNTER
Pharmacy called requesting a early refill on patient's medication. Pharmacy advised patient unable to continue taking medication filled last month at [facility] due name brand of medication. Pharmacy advised patient only able to tolerate Teva brand Adderall. Take 1 Tab by mouth six (6) days a week. Max Daily Amount: 7.5 mg. In the am. teva brand only please      Take 1 Tab by mouth daily. Max Daily Amount: 5 mg. teva brand only please  Indications: attention deficit disorder with hyperactivity         Please call pharmacy today to advise.      [facility] [address] [place] [address] [phone]

## 2021-01-04 ENCOUNTER — OFFICE VISIT (OUTPATIENT)
Dept: SURGERY | Age: 31
End: 2021-01-04
Payer: COMMERCIAL

## 2021-01-04 VITALS
HEART RATE: 76 BPM | TEMPERATURE: 98.6 F | WEIGHT: 140 LBS | HEIGHT: 67 IN | SYSTOLIC BLOOD PRESSURE: 112 MMHG | DIASTOLIC BLOOD PRESSURE: 62 MMHG | BODY MASS INDEX: 21.97 KG/M2

## 2021-01-04 DIAGNOSIS — G43.819 OTHER MIGRAINE WITHOUT STATUS MIGRAINOSUS, INTRACTABLE: ICD-10-CM

## 2021-01-04 DIAGNOSIS — N60.02 BREAST CYST, LEFT: Primary | ICD-10-CM

## 2021-01-04 PROCEDURE — 99214 OFFICE O/P EST MOD 30 MIN: CPT | Performed by: SURGERY

## 2021-01-04 PROCEDURE — 76642 ULTRASOUND BREAST LIMITED: CPT | Performed by: SURGERY

## 2021-01-04 RX ORDER — RIZATRIPTAN BENZOATE 10 MG/1
10 TABLET, ORALLY DISINTEGRATING ORAL
Qty: 9 TAB | Refills: 0 | Status: SHIPPED | OUTPATIENT
Start: 2021-01-04 | End: 2021-02-01 | Stop reason: SDUPTHER

## 2021-01-04 NOTE — PATIENT INSTRUCTIONS

## 2021-01-04 NOTE — PROGRESS NOTES
HISTORY OF PRESENT ILLNESS  Alana Riley is a 27 y.o. female. HPI ESTABLISHED patient here for follow up LEFT breast complex cyst. The patient thinks that it might be a little bigger than it was last time she was here 11/2019. she has not had follow up imaging done. Last visit 11/2019 - Small 4 mm superficial mass 2:00 left breast retroareolar. 35 yo female complex cyst left breast. Birad 3. I agree with follow-up imaging.     Family history-  Maternal great aunt had breast cancer. Maternal grandmother had lung cancer.      Breast imaging-  US Results (most recent):  Results from Hospital Encounter encounter on 11/07/19   US BREAST LT LIMITED=<3 QUAD    Narrative History: Left breast lump. Ultrasonography of the 3:00 position of left breast demonstrates a 3.9 mm  complex cyst in the region of palpable abnormality. Impression IMPRESSION:  1. BI-RADS category 3, probably benign. 2. The patient should return in 6 months for unilateral left breast ultrasound. 3. She was informed. Review of Systems   All other systems reviewed and are negative. Physical Exam  Vitals signs and nursing note reviewed. Chest:      Breasts: Breasts are symmetrical.         Right: No inverted nipple, mass, nipple discharge, skin change or tenderness. Left: Mass (4 mm nodule 2:00 edge areola) present. No inverted nipple, nipple discharge, skin change or tenderness. Lymphadenopathy:      Cervical: No cervical adenopathy. BREAST ULTRASOUND  Indication: left breast mass 2:00   Technique: The area was scanned using a high-frequency linear-array near-field transducer  Findings: 3 x 3 x 3 mm mass, oval, hypoechoic, through transmission  Impression: Benign cyst  Disposition: Discussed aspiration or observation. Pt has elected for observation lesion unchanged     ASSESSMENT and PLAN    ICD-10-CM ICD-9-CM    1. Breast cyst, left  N60.02 610.0      Left breast cyst unchanged over 1 year.    Continue breast exams.    Will let me know if enlarges   Start mammo age 36   Total time spent with patient for counseling, coordination care 30 minutes

## 2021-01-04 NOTE — LETTER
1/6/2021 Patient: Yuval Martinez YOB: 1990 Date of Visit: 1/4/2021 Herlinda Traore MD 
[address] [facility] [address] [facility] [address] [address] [address] [facility] Dear Herlinda Traore MD, Thank you for referring Ms. Alana Rivera to [address] MAIN OFFICE SU[address] [facility] for evaluation. My notes for this consultation are attached. If you have questions, please do not hesitate to call me. I look forward to following your patient along with you. Sincerely, Eben Suárez MD

## 2021-01-14 DIAGNOSIS — F41.8 SITUATIONAL ANXIETY: ICD-10-CM

## 2021-01-15 RX ORDER — CLONAZEPAM 0.5 MG/1
TABLET ORAL
Qty: 8 TAB | Refills: 0 | Status: SHIPPED | OUTPATIENT
Start: 2021-01-15 | End: 2021-03-04 | Stop reason: SDUPTHER

## 2021-02-01 DIAGNOSIS — G43.819 OTHER MIGRAINE WITHOUT STATUS MIGRAINOSUS, INTRACTABLE: ICD-10-CM

## 2021-02-01 RX ORDER — RIZATRIPTAN BENZOATE 10 MG/1
10 TABLET, ORALLY DISINTEGRATING ORAL
Qty: 9 TAB | Refills: 0 | Status: SHIPPED | OUTPATIENT
Start: 2021-02-01 | End: 2021-03-04 | Stop reason: SDUPTHER

## 2021-02-03 ENCOUNTER — TRANSCRIBE ORDER (OUTPATIENT)
Dept: SCHEDULING | Age: 31
End: 2021-02-03

## 2021-02-03 DIAGNOSIS — M25.572 CHRONIC PAIN OF LEFT ANKLE: Primary | ICD-10-CM

## 2021-02-03 DIAGNOSIS — G89.29 CHRONIC PAIN OF LEFT ANKLE: Primary | ICD-10-CM

## 2021-03-16 ENCOUNTER — TELEPHONE (OUTPATIENT)
Dept: INTERNAL MEDICINE CLINIC | Age: 31
End: 2021-03-16

## 2021-03-16 ENCOUNTER — VIRTUAL VISIT (OUTPATIENT)
Dept: INTERNAL MEDICINE CLINIC | Age: 31
End: 2021-03-16
Payer: COMMERCIAL

## 2021-03-16 DIAGNOSIS — F43.9 SITUATIONAL STRESS: ICD-10-CM

## 2021-03-16 DIAGNOSIS — G43.819 OTHER MIGRAINE WITHOUT STATUS MIGRAINOSUS, INTRACTABLE: ICD-10-CM

## 2021-03-16 DIAGNOSIS — F98.8 ATTENTION DEFICIT DISORDER (ADD) IN ADULT: Primary | ICD-10-CM

## 2021-03-16 PROCEDURE — 99214 OFFICE O/P EST MOD 30 MIN: CPT | Performed by: INTERNAL MEDICINE

## 2021-03-16 RX ORDER — RIZATRIPTAN BENZOATE 10 MG/1
10 TABLET, ORALLY DISINTEGRATING ORAL
Qty: 9 TAB | Refills: 0 | Status: SHIPPED | OUTPATIENT
Start: 2021-04-16 | End: 2021-04-19 | Stop reason: SDUPTHER

## 2021-03-16 NOTE — PROGRESS NOTES
Diagnoses and all orders for this visit:    1. Attention deficit disorder (ADD) in adult  Stable  Continue meds  Patient has a rotating work schedule she has learned when to take the medication such that it does not interfere with her sleep      2. Other migraine without status migrainosus, intractable  Increase in medication triptan  related to patient's rotating PICU schedule  -     rizatriptan (MAXALT-MLT) 10 mg disintegrating tablet; Take 1 Tab by mouth once as needed for Migraine for up to 1 dose. She is trying to balance her sleep and eating around this rotating schedule to try to prevent her migraines    3. Situational stress  Her cat passed which was very stressful for her. She was very close to her cat. Her significant other/boyfriend also very sad  She is seeing a counselor  She required more clonazepam during this time may need refill. We will monitor clonazepam use. She will follow-up in 3 months or earlier if needed. Chief Complaint   Patient presents with    Medication Evaluation     no concerns      Patient presents to follow-up from her last virtual visit. Attention deficit disorder  Patient reports that she has been able to figure out how to use her medication such that it does not interfere with her sleep. She has complicated rotating schedule involving days evenings and nights. She notes the dose is seen still seems to be correct. Migraines  Every third fri and Saturday doing nights, not back to back sometimes space in between  Tracking for the past 2 months  Gets 4 am after night shift. She is taking close to 9 triptans/month and use 2 -5 triptans/month  When takes magnesium will have diarrhea.   16 on call hours    Situational stress  Cat passed traumatic for her  [place]  She has restarted some yoga and walking  Restarted counseling Alan Avitia,  Active issues with bereavement being addressed  Took clonazepam with cat passing  Not panicing  She has about 4 clonazepam left  Counseling every other week  She agrees the frequency of the clonazepam has been greater but due to the stress of her cats illness and passing                Past Medical History:   Diagnosis Date    Anxiety     prn clonazepam    Headache     controlled with magnesium and prn maxalt     History reviewed. No pertinent surgical history. Social History     Socioeconomic History    Marital status: SINGLE     Spouse name: Not on file    Number of children: Not on file    Years of education: Not on file    Highest education level: Not on file   Tobacco Use    Smoking status: Never Smoker    Smokeless tobacco: Never Used   Substance and Sexual Activity    Alcohol use: Yes     Alcohol/week: 3.0 standard drinks     Types: 1 Glasses of wine, 1 Cans of beer, 1 Shots of liquor per week     Comment: occasional weekend    Drug use: No    Sexual activity: Yes     Partners: Male     Birth control/protection: Condom     Comment: followed by [facility] [place] physicians   Social History Narrative    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 8 Tab 0    dextroamphetamine-amphetamine (ADDERALL) 7.5 mg tablet Take 1 Tab by mouth six (6) days a week. Max Daily Amount: 7.5 mg. In the am. teva brand only please 72 Tab 0    dextroamphetamine-amphetamine (ADDERALL) 5 mg tablet Take 1 Tab by mouth daily. Max Daily Amount: 5 mg.  teva brand only please  Indications: attention deficit disorder with hyperactivity 90 Tab 0    albuterol (PROVENTIL HFA, VENTOLIN HFA, PROAIR HFA) 90 mcg/actuation inhaler Take 1 Puff by inhalation every four (4) hours as needed for Wheezing. 1 Inhaler 6    calcium-cholecalciferol, d3, (CALCIUM 600 + D) 600-125 mg-unit tab Take 1,000 mg by mouth.  zinc sulfate (ZINC-15 PO) Take  by mouth.  ascorbic acid, vitamin C, (VITAMIN C) 500 mg tablet Take  by mouth.  ibuprofen (MOTRIN) 600 mg tablet Take  by mouth every six (6) hours as needed for Pain.  magnesium gluconate 500 mg (27 mg  elemental) tablet Take 1 Tab by mouth daily. 90 Tab 1    rizatriptan (MAXALT-MLT) 10 mg disintegrating tablet Take 1 Tab by mouth once as needed for Migraine for up to 1 dose. 9 Tab 0    L-THREONINE 150 mg by Does Not Apply route.  MULTIVITAMIN PO Take 1 Tab by mouth daily. Allergies   Allergen Reactions    Dilaudid [Hydromorphone] Hives       Review of Systems - General ROS: negative for - chills, fatigue, fever or hot flashes  Cardiovascular ROS: no chest pain or dyspnea on exertion  Respiratory ROS: no cough, shortness of breath, or wheezing    There were no vitals taken for this visit. General Appearance:  Well developed, well nourished,alert and oriented x 3, and individual in no acute distress. Ears/Nose/Mouth/Throat:   Hearing grossly normal.         Neck: Supple, no lad, no bruits   Chest:   Lungs clear to auscultation bilaterally. Cardiovascular:  Regular rate and rhythm, S1, S2 normal, no murmur. Abdomen:   Soft, non-tender, bowel sounds are active. 32 inches   Extremities: No edema bilaterally.     Skin: Warm and dry, no suspicious lesions

## 2021-03-16 NOTE — TELEPHONE ENCOUNTER
----- Message from Cole De Los Santos sent at 3/16/2021  8:06 AM EDT -----  Regarding: Dr Osbaldo Nolasco: [phone]  Patient return call    Caller's first and last name and relationship (if not the patient): Pt. Best contact number(s): [phone]       Whose call is being returned: Unknown. Details to clarify the request: Possible check in call for 8:40 VV appointment.       Cole De Los Santos

## 2021-03-28 DIAGNOSIS — F41.8 SITUATIONAL ANXIETY: ICD-10-CM

## 2021-03-29 RX ORDER — CLONAZEPAM 0.5 MG/1
TABLET ORAL
Qty: 8 TAB | Refills: 0 | Status: SHIPPED | OUTPATIENT
Start: 2021-03-29 | End: 2021-05-28 | Stop reason: SDUPTHER

## 2021-04-19 DIAGNOSIS — G43.819 OTHER MIGRAINE WITHOUT STATUS MIGRAINOSUS, INTRACTABLE: ICD-10-CM

## 2021-04-19 RX ORDER — RIZATRIPTAN BENZOATE 10 MG/1
10 TABLET, ORALLY DISINTEGRATING ORAL
Qty: 9 TAB | Refills: 0 | Status: SHIPPED | OUTPATIENT
Start: 2021-04-19 | End: 2021-05-28 | Stop reason: SDUPTHER

## 2021-04-26 ENCOUNTER — VIRTUAL VISIT (OUTPATIENT)
Dept: INTERNAL MEDICINE CLINIC | Age: 31
End: 2021-04-26
Payer: COMMERCIAL

## 2021-04-26 DIAGNOSIS — G43.919 INTRACTABLE MIGRAINE WITHOUT STATUS MIGRAINOSUS, UNSPECIFIED MIGRAINE TYPE: ICD-10-CM

## 2021-04-26 DIAGNOSIS — T78.40XD ALLERGY, SUBSEQUENT ENCOUNTER: Primary | ICD-10-CM

## 2021-04-26 PROCEDURE — 99214 OFFICE O/P EST MOD 30 MIN: CPT | Performed by: INTERNAL MEDICINE

## 2021-04-26 RX ORDER — MONTELUKAST SODIUM 10 MG/1
10 TABLET ORAL DAILY
Qty: 30 TAB | Refills: 1 | Status: SHIPPED | OUTPATIENT
Start: 2021-04-26 | End: 2021-11-12

## 2021-04-26 RX ORDER — NORTRIPTYLINE HYDROCHLORIDE 10 MG/1
10 CAPSULE ORAL
Qty: 30 CAP | Refills: 0 | Status: SHIPPED | OUTPATIENT
Start: 2021-04-26 | End: 2021-05-30

## 2021-04-26 RX ORDER — PREDNISONE 20 MG/1
TABLET ORAL
COMMUNITY
Start: 2021-04-23 | End: 2021-11-12

## 2021-04-26 NOTE — PROGRESS NOTES
Diagnoses and all orders for this visit:    1. Allergy, subsequent encounter  Rash appears to be allergy mediated and responding to prednisone  Follow-up with allergist  Continue Zyrtec and may need another antihistamine and Flonase or Nasacort to decrease eye inflammation  -     montelukast (SINGULAIR) 10 mg tablet; Take 1 Tab by mouth daily. She will cut back on dairy  She is not eating any sugar or sugar substitute products  2. Intractable migraine without status migrainosus, unspecified migraine type  Not optimally controlled   Nightshifts aggravating  -     nortriptyline (PAMELOR) 10 mg capsule; Take 1 Cap by mouth nightly. Chief Complaint   Patient presents with    Follow-up     rash around the chest, arms, face, and eyes     Headache     getting worse        Hives   Patient reports hives became significantly worse and had to start on prednisone. Tomorrow will be her last day. She has been on prednisone 40 mg for 5 days. She will have patch testing with allergy in 3 weeks on May 17. She notes that her symptoms seem to start after starting her job with [employer]. She has never had a history of eczema or dermatitis before. She was able to work at [employer] with no issues  Allergy testing in November + for mold  Food allergy testing in past negative  All free and clear    She reports her symptoms started after she woke up after nigth shift in September 2020  Off for 2 days and will happen. She notes her symptoms have continued since then. Chronic allegra every day  Zyrtec then to allegra  benedryl 2-3 weeks     She will be working this week wed day, Friday   She will do nights Fri and Saturday  No sob with her symptoms but she does have periorbital inflammation      Migraines  She notes that her migraines are stable. She has to pay out-of-pocket if she needs more than 9 tablets of Maxalt per month.   Neurology dr. Familia Becerril  With every night shift she has a migraine associated    Past Medical History:   Diagnosis Date    Anxiety     prn clonazepam    Headache     controlled with magnesium and prn maxalt     History reviewed. No pertinent surgical history. Social History     Socioeconomic History    Marital status: SINGLE     Spouse name: Not on file    Number of children: Not on file    Years of education: Not on file    Highest education level: Not on file   Tobacco Use    Smoking status: Never Smoker    Smokeless tobacco: Never Used   Substance and Sexual Activity    Alcohol use: Yes     Alcohol/week: 3.0 standard drinks     Types: 1 Glasses of wine, 1 Cans of beer, 1 Shots of liquor per week     Comment: occasional weekend    Drug use: No    Sexual activity: Yes     Partners: Male     Birth control/protection: Condom     Comment: followed by [facility] [place] physicians   Social History Narrative    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 8 Tab 0    dextroamphetamine-amphetamine (ADDERALL) 7.5 mg tablet Take 1 Tab by mouth six (6) days a week. Max Daily Amount: 7.5 mg. In the am. teva brand only please 72 Tab 0    dextroamphetamine-amphetamine (ADDERALL) 5 mg tablet Take 1 Tab by mouth daily. Max Daily Amount: 5 mg.  teva brand only please  Indications: attention deficit disorder with hyperactivity 90 Tab 0    albuterol (PROVENTIL HFA, VENTOLIN HFA, PROAIR HFA) 90 mcg/actuation inhaler Take 1 Puff by inhalation every four (4) hours as needed for Wheezing. 1 Inhaler 6    calcium-cholecalciferol, d3, (CALCIUM 600 + D) 600-125 mg-unit tab Take 1,000 mg by mouth.  zinc sulfate (ZINC-15 PO) Take  by mouth.  ascorbic acid, vitamin C, (VITAMIN C) 500 mg tablet Take  by mouth.  ibuprofen (MOTRIN) 600 mg tablet Take  by mouth every six (6) hours as needed for Pain.  magnesium gluconate 500 mg (27 mg  elemental) tablet Take 1 Tab by mouth daily. 90 Tab 1    predniSONE (DELTASONE) 20 mg tablet       rizatriptan (MAXALT-MLT) 10 mg disintegrating tablet Take 1 Tab by mouth once as needed for Migraine for up to 1 dose. 9 Tab 0    L-THREONINE 150 mg by Does Not Apply route.  MULTIVITAMIN PO Take 1 Tab by mouth daily. Allergies   Allergen Reactions    Dilaudid [Hydromorphone] Hives       Review of Systems - General ROS: negative for - chills or fever  Cardiovascular ROS: no chest pain or dyspnea on exertion  Respiratory ROS: no cough, shortness of breath, or wheezing                        There were no vitals taken for this visit.   Constitutional: [x] Appears well-developed and well-nourished [x] No apparent distress      [] Abnormal -     Mental status: [x] Alert and awake  [x] Oriented to person/place/time [x] Able to follow commands    [] Abnormal -     Eyes:   EOM    [x]  Normal    [] Abnormal -   Sclera  [x]  Normal    [] Abnormal -          Discharge [x]  None visible   [] Abnormal -     HENT: [x] Normocephalic, atraumatic  [] Abnormal -   [x] Mouth/Throat: Mucous membranes are moist    External Ears [x] Normal  [] Abnormal -    Neck: [x] No visualized mass [] Abnormal -     Pulmonary/Chest: [x] Respiratory effort normal   [x] No visualized signs of difficulty breathing or respiratory distress        [] Abnormal -      Musculoskeletal:   [x] Normal gait with no signs of ataxia         [x] Normal range of motion of neck        [] Abnormal -     Neurological:        [x] No Facial Asymmetry (Cranial nerve 7 motor function) (limited exam due to video visit)          [x] No gaze palsy        [] Abnormal -          Skin:        [x] No significant exanthematous lesions or discoloration noted on facial skin         [] Abnormal -            Psychiatric:       [x] Normal Affect [] Abnormal -        [x] No Hallucinations      ATTENTION:   This medical record was transcribed using an electronic medical records/speech recognition system. Although proofread, it may and can contain electronic, spelling and other errors. Corrections may be executed at a later time. Please contact us for any clarifications as needed. On this date 04/26/21  I have spent 30 minutes reviewing previous notes, test results and face to face with the patient discussing the diagnosis and importance of compliance with the treatment plan as well as documenting on the day of the visit. This is a video virtual visit. I was located in my office and the patient was located in his/her home. Pt has given consent and aware this visit will be billed to his/her health insurance.

## 2021-05-25 ENCOUNTER — NURSE TRIAGE (OUTPATIENT)
Dept: OTHER | Facility: CLINIC | Age: 31
End: 2021-05-25

## 2021-05-25 NOTE — TELEPHONE ENCOUNTER
Reason for Disposition   Information only question and nurse able to answer    Answer Assessment - Initial Assessment Questions  1. REASON FOR CALL or QUESTION: \"What is your reason for calling today? \" or \"How can I best help you? \" or \"What question do you have that I can help answer? \"      Was notified Friday 5/21/2021 from patch testing that she is allergic to a component in the soap at work- cocamidopropyl betaine and dimethylamino propylamine (both allergies). Propyline glycol is also an irritant. States that she was having rash, hives, and eye swelling due to this. This period was starting September of 2020 until present. Protocols used: INFORMATION ONLY CALL - NO TRIAGE-ADULT-OH    Brief description of triage: reporting an allergen that she has to certain components of the soap at work. Triage indicates for patient to contact [address] [place] [address] for her additional questions, and to continue under the care of her physician. Care advice provided, patient verbalizes understanding; denies any other questions or concerns; instructed to call back for any new or worsening symptoms. Attention Provider: Thank you for allowing me to participate in the care of your patient. The patient was connected to triage in response to symptoms provided. Please do not respond through this encounter as the response is not directed to a shared pool.

## 2021-05-26 ENCOUNTER — PATIENT MESSAGE (OUTPATIENT)
Dept: INTERNAL MEDICINE CLINIC | Age: 31
End: 2021-05-26

## 2021-05-26 NOTE — LETTER
5/27/2021 1:54 PM 
 
Ms. Casper Hall [address] [facility] [id number] To whom it may concern: 
 
Please send the most recent medical records for Casper Hall, date of birth 1990 for continuity of care. Please fax to [phone] Sincerely, Vijay Boyd MD

## 2021-05-27 NOTE — TELEPHONE ENCOUNTER
From: Arsh Client  To: Edy Avitia MD  Sent: 5/26/2021 12:40 PM EDT  Subject: Non-Urgent Medical Question    Dr. Luis Mcclellan,    I wanted to let you know that I was positive for two allergens in my patch testing. The one allergen is in the soap at work, which explains why my eye swelling/rashes flared up at work! I also showed \"irritation\" to one substance, which is in some of the hand sanitizers. Can you please fax or email any office notes/messages regarding my allergic symptoms to employee health at [place] [address]? They need to provide me with different soap at work. I also am going to see if workers comp will pay for the testing, as none of my products at home have these ingredients in them.   Fax: [phone]  Email: [email or web address]    Thanks,    Hira Goldstein

## 2021-07-20 DIAGNOSIS — G43.819 OTHER MIGRAINE WITHOUT STATUS MIGRAINOSUS, INTRACTABLE: ICD-10-CM

## 2021-07-20 DIAGNOSIS — F41.8 SITUATIONAL ANXIETY: ICD-10-CM

## 2021-07-20 RX ORDER — CLONAZEPAM 0.5 MG/1
TABLET ORAL
Qty: 8 TABLET | Refills: 0 | Status: SHIPPED | OUTPATIENT
Start: 2021-07-20 | End: 2021-10-05 | Stop reason: SDUPTHER

## 2021-07-20 RX ORDER — RIZATRIPTAN BENZOATE 10 MG/1
10 TABLET, ORALLY DISINTEGRATING ORAL
Qty: 9 TABLET | Refills: 0 | Status: SHIPPED | OUTPATIENT
Start: 2021-07-20 | End: 2021-08-19 | Stop reason: SDUPTHER

## 2021-08-19 DIAGNOSIS — F90.2 ATTENTION DEFICIT HYPERACTIVITY DISORDER (ADHD), COMBINED TYPE: ICD-10-CM

## 2021-08-19 DIAGNOSIS — G43.819 OTHER MIGRAINE WITHOUT STATUS MIGRAINOSUS, INTRACTABLE: ICD-10-CM

## 2021-08-19 RX ORDER — RIZATRIPTAN BENZOATE 10 MG/1
10 TABLET, ORALLY DISINTEGRATING ORAL
Qty: 9 TABLET | Refills: 0 | Status: SHIPPED | OUTPATIENT
Start: 2021-08-19 | End: 2021-09-15 | Stop reason: SDUPTHER

## 2021-08-19 RX ORDER — DEXTROAMPHETAMINE SACCHARATE, AMPHETAMINE ASPARTATE, DEXTROAMPHETAMINE SULFATE AND AMPHETAMINE SULFATE 1.25; 1.25; 1.25; 1.25 MG/1; MG/1; MG/1; MG/1
5 TABLET ORAL DAILY
Qty: 90 TABLET | Refills: 0 | Status: SHIPPED | OUTPATIENT
Start: 2021-08-19 | End: 2021-11-24 | Stop reason: SDUPTHER

## 2021-09-15 ENCOUNTER — TELEPHONE (OUTPATIENT)
Dept: INTERNAL MEDICINE CLINIC | Age: 31
End: 2021-09-15

## 2021-09-15 ENCOUNTER — VIRTUAL VISIT (OUTPATIENT)
Dept: INTERNAL MEDICINE CLINIC | Age: 31
End: 2021-09-15
Payer: COMMERCIAL

## 2021-09-15 DIAGNOSIS — G43.819 OTHER MIGRAINE WITHOUT STATUS MIGRAINOSUS, INTRACTABLE: ICD-10-CM

## 2021-09-15 DIAGNOSIS — R41.840 ATTENTION DEFICIT: Primary | ICD-10-CM

## 2021-09-15 DIAGNOSIS — L23.4 ALLERGIC CONTACT DERMATITIS DUE TO DYES: ICD-10-CM

## 2021-09-15 PROCEDURE — 99214 OFFICE O/P EST MOD 30 MIN: CPT | Performed by: INTERNAL MEDICINE

## 2021-09-15 RX ORDER — RIZATRIPTAN BENZOATE 10 MG/1
10 TABLET, ORALLY DISINTEGRATING ORAL
Qty: 9 TABLET | Refills: 0 | Status: SHIPPED | OUTPATIENT
Start: 2021-09-15 | End: 2021-10-19 | Stop reason: SDUPTHER

## 2021-09-15 NOTE — TELEPHONE ENCOUNTER
----- Message from Dianne Snyder sent at 9/15/2021  8:30 AM EDT -----  Regarding: /telephone  Contact: [phone]  Patient return call    Caller's first and last name and relationship (if not the patient):n/a      Best contact number(s):[phone]       Whose call is being returned:nurse      Details to clarify the request:pt has an vv appt @ 8:40am      Dianne Snyder

## 2021-09-15 NOTE — PROGRESS NOTES
Diagnoses and all orders for this visit:    1. Attention deficit  Continue current medications  Appears to be stable  No weight loss or interference with sleep    2. Other migraine without status migrainosus, intractable  Not optimally controlled due to work at nights and rotating schedule  Discussed with patient and her work regimen may improve but she still may need coverage for migraines. Discussed with patient and will cover comprehensive FMLA. She may need to 3 days every week however when discussing with her she will likely not need. She actually goes to work with migraines regularly and prefers to work due to job responsibilities and her colleagues. She will send over the FMLA papers  -     rizatriptan (MAXALT-MLT) 10 mg disintegrating tablet; Take 1 Tablet by mouth once as needed for Migraine for up to 1 dose.  -     REFERRAL TO NEUROLOGY    3. Allergic contact dermatitis due to dyes  Found to be allergic to the soap. 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 She has been doing weekend nights and some nights during the week but now she is more senior in her night schedule will likely be improved. Dermatitis    Worked through [email or web address]  Both hands neck and face were involved  She is now using her own soap and     Headaches/migraines  Patient reports that she is still getting headaches and migraines because of her nightshifts and even though not on night shift because of rotation migraines during day shifts as well. She is hopeful that this will improve because her manager has taken her off more nights.   Now with GI illness could not work and may need FMLA for migraines  She takes riza triptan by 5 am at first night shift, maybe next night shift too, then body gets shifted and migraine  Cant take amitriptyline because of shifting  Shifting from weekend neight to day shifts will start in October      Discussed with pt and may need to cover more than actual amount  Thinks she may not use it but would like to know in place  isidro going to work with migaines      adderall  She is currently taking  7.5 mg  Six days /week in the am  5 mg at noon seven days a week, occasionally will an additional 5 at 4 pm just during late shifts  Keeping her stable  No weight loss  No tachycardia unless pediatric code    Not on bcp  Sexually active  Condom and spermicide  Does not want IUD defers    Past Medical History:   Diagnosis Date    Anxiety     prn clonazepam    Headache     controlled with magnesium and prn maxalt     History reviewed. No pertinent surgical history. Social History     Socioeconomic History    Marital status: SINGLE     Spouse name: Not on file    Number of children: Not on file    Years of education: Not on file    Highest education level: Not on file   Tobacco Use    Smoking status: Never Smoker    Smokeless tobacco: Never Used   Vaping Use    Vaping Use: Never used   Substance and Sexual Activity    Alcohol use:  Yes     Alcohol/week: 3.0 standard drinks     Types: 1 Glasses of wine, 1 Cans of beer, 1 Shots of liquor per week     Comment: occasional weekend    Drug use: No    Sexual activity: Yes     Partners: Male     Birth control/protection: Condom     Comment: followed by [facility] [place] physicians   Social History Narrative    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[place], clinicals through [employer]        No kids    Boyfriend, Digna Dakins X 9 years     Social Determinants of Health     Financial Resource Strain:     Difficulty of Paying Living Expenses:    Food Insecurity:     Worried About Running Out of Food in the Last Year:     [address] in the Last Year:    Transportation Needs:     Lack of Transportation (Medical):  Lack of Transportation (Non-Medical):    Physical Activity:     Days of Exercise per Week:     Minutes of Exercise per Session:    Stress:     Feeling of Stress :    Social Connections:     Frequency of Communication with Friends and Family:     Frequency of Social Gatherings with Friends and Family:     Attends Yarsani Services:     Active Member of Clubs or Organizations:     Attends Club or Organization Meetings:     Marital Status:      Family History   Problem Relation Age of Onset    Diabetes Mother         prediabetic    Hypertension Mother     Arthritis-osteo Father     Hypertension Father     Cancer Maternal Grandmother         lung    Breast Cancer Other     Heart Disease Neg Hx     Heart Attack Neg Hx      Current Outpatient Medications   Medication Sig Dispense Refill    dextroamphetamine-amphetamine (ADDERALL) 5 mg tablet Take 1 Tablet by mouth daily. Max Daily Amount: 5 mg. teva brand only please  Indications: attention deficit disorder with hyperactivity 90 Tablet 0    dextroamphetamine-amphetamine (ADDERALL) 7.5 mg tablet Take 1 Tablet by mouth six (6) days a week. Max Daily Amount: 7.5 mg. In the am. teva brand only please 26 Tablet 0    clonazePAM (KlonoPIN) 0.5 mg tablet Take 1/2 to 1 tab daily ONLY as needed. 8 Tablet 0    albuterol (PROVENTIL HFA, VENTOLIN HFA, PROAIR HFA) 90 mcg/actuation inhaler Take 1 Puff by inhalation every four (4) hours as needed for Wheezing. 1 Inhaler 6    calcium-cholecalciferol, d3, (CALCIUM 600 + D) 600-125 mg-unit tab Take 1,000 mg by mouth.  zinc sulfate (ZINC-15 PO) Take  by mouth.  ascorbic acid, vitamin C, (VITAMIN C) 500 mg tablet Take  by mouth.  ibuprofen (MOTRIN) 600 mg tablet Take  by mouth every six (6) hours as needed for Pain.  magnesium gluconate 500 mg (27 mg  elemental) tablet Take 1 Tab by mouth daily.  90 Tab 1    rizatriptan (MAXALT-MLT) 10 mg disintegrating tablet Take 1 Tablet by mouth once as needed for Migraine for up to 1 dose. 9 Tablet 0    predniSONE (DELTASONE) 20 mg tablet  (Patient not taking: Reported on 9/15/2021)      montelukast (SINGULAIR) 10 mg tablet Take 1 Tab by mouth daily. (Patient not taking: Reported on 9/15/2021) 30 Tab 1    L-THREONINE 150 mg by Does Not Apply route.  MULTIVITAMIN PO Take 1 Tab by mouth daily. Allergies   Allergen Reactions    Dilaudid [Hydromorphone] Hives       Review of Systems - General ROS: negative for - chills or fever  Cardiovascular ROS: no chest pain or dyspnea on exertion  Respiratory ROS: no cough, shortness of breath, or wheezing                        There were no vitals taken for this visit.   Constitutional: [x] Appears well-developed and well-nourished [x] No apparent distress      [] Abnormal -     Mental status: [x] Alert and awake  [x] Oriented to person/place/time [x] Able to follow commands    [] Abnormal -     Eyes:   EOM    [x]  Normal    [] Abnormal -   Sclera  [x]  Normal    [] Abnormal -          Discharge [x]  None visible   [] Abnormal -     HENT: [x] Normocephalic, atraumatic  [] Abnormal -   [x] Mouth/Throat: Mucous membranes are moist    External Ears [x] Normal  [] Abnormal -    Neck: [x] No visualized mass [] Abnormal -     Pulmonary/Chest: [x] Respiratory effort normal   [x] No visualized signs of difficulty breathing or respiratory distress        [] Abnormal -      Musculoskeletal:   [x] Normal gait with no signs of ataxia         [x] Normal range of motion of neck        [] Abnormal -     Neurological:        [x] No Facial Asymmetry (Cranial nerve 7 motor function) (limited exam due to video visit)          [x] No gaze palsy        [] Abnormal -          Skin:        [x] No significant exanthematous lesions or discoloration noted on facial skin         [] Abnormal -            Psychiatric:       [x] Normal Affect [] Abnormal -        [x] No Hallucinations      ATTENTION:   This medical record was transcribed using an electronic medical records/speech recognition system. Although proofread, it may and can contain electronic, spelling and other errors. Corrections may be executed at a later time. Please contact us for any clarifications as needed. On this date 09/15/21  I have spent 30 minutes reviewing previous notes, test results and face to face with the patient discussing the diagnosis and importance of compliance with the treatment plan as well as documenting on the day of the visit. This is a video virtual visit. I was located in my office and the patient was located in his/her home. Pt has given consent and aware this visit will be billed to his/her health insurance.

## 2021-10-05 DIAGNOSIS — F41.8 SITUATIONAL ANXIETY: ICD-10-CM

## 2021-10-05 RX ORDER — CLONAZEPAM 0.5 MG/1
TABLET ORAL
Qty: 8 TABLET | Refills: 0 | Status: SHIPPED | OUTPATIENT
Start: 2021-10-05 | End: 2021-11-24 | Stop reason: SDUPTHER

## 2021-10-19 DIAGNOSIS — G43.819 OTHER MIGRAINE WITHOUT STATUS MIGRAINOSUS, INTRACTABLE: ICD-10-CM

## 2021-10-20 RX ORDER — RIZATRIPTAN BENZOATE 10 MG/1
10 TABLET, ORALLY DISINTEGRATING ORAL
Qty: 9 TABLET | Refills: 0 | Status: SHIPPED | OUTPATIENT
Start: 2021-10-20 | End: 2021-11-24 | Stop reason: SDUPTHER

## 2021-11-09 ENCOUNTER — NURSE TRIAGE (OUTPATIENT)
Dept: OTHER | Facility: CLINIC | Age: 31
End: 2021-11-09

## 2021-11-09 NOTE — TELEPHONE ENCOUNTER
Reason for Disposition   Patient wants to be seen    Answer Assessment - Initial Assessment Questions  1. DESCRIPTION: \"Describe your dizziness. \"      Stated whenever she stands up her vision would go black. This stopped and now she just feels lightheaded and dizzy. 2. LIGHTHEADED: \"Do you feel lightheaded? \" (e.g., somewhat faint, woozy, weak upon standing)      Yes     3. VERTIGO: \"Do you feel like either you or the room is spinning or tilting? \" (i.e. vertigo)      No     4. SEVERITY: \"How bad is it? \"  \"Do you feel like you are going to faint? \" \"Can you stand and walk? \"    - MILD - walking normally    - MODERATE - interferes with normal activities (e.g., work, school)     - SEVERE - unable to stand, requires support to walk, feels like passing out now. Moderate     5. ONSET:  \"When did the dizziness begin? \"      Over the last 6 months she has been weaker and tired. Yesterday morning at work it was worse than normal which prompted the call. 6. AGGRAVATING FACTORS: \"Does anything make it worse? \" (e.g., standing, change in head position)      Standing up makes it worse, turning head side to sided. 7. HEART RATE: \"Can you tell me your heart rate? \" \"How many beats in 15 seconds? \"  (Note: not all patients can do this)        Yesterday: 76 pulse     8. CAUSE: \"What do you think is causing the dizziness? \"      Unsure     9. RECURRENT SYMPTOM: \"Have you had dizziness before? \" If so, ask: \"When was the last time? \" \"What happened that time? \"      Stated she has had this in the past but never lasted this long. 10. OTHER SYMPTOMS: \"Do you have any other symptoms? \" (e.g., fever, chest pain, vomiting, diarrhea, bleeding)        Migraines on and off. 11. PREGNANCY: \"Is there any chance you are pregnant? \" \"When was your last menstrual period? \"        No, LMP started yesterday    Protocols used: DIZZINESS-ADULT-OH    Received call from Echo Lopez at [facility] with Red Flag Complaint.     Brief description of triage: Dizziness, close to passing out. Triage indicates for patient to be seen today or go to [facility]. Care advice provided, patient verbalizes understanding; denies any other questions or concerns; instructed to call back for any new or worsening symptoms. Writer provided warm transfer to Monique Ambriz at [facility] for appointment scheduling. Attention Provider: Thank you for allowing me to participate in the care of your patient. The patient was connected to triage in response to information provided to the ECC. Please do not respond through this encounter as the response is not directed to a shared pool.

## 2021-11-12 ENCOUNTER — OFFICE VISIT (OUTPATIENT)
Dept: INTERNAL MEDICINE CLINIC | Age: 31
End: 2021-11-12
Payer: COMMERCIAL

## 2021-11-12 ENCOUNTER — PATIENT MESSAGE (OUTPATIENT)
Dept: INTERNAL MEDICINE CLINIC | Age: 31
End: 2021-11-12

## 2021-11-12 VITALS
RESPIRATION RATE: 16 BRPM | HEART RATE: 92 BPM | OXYGEN SATURATION: 98 % | DIASTOLIC BLOOD PRESSURE: 72 MMHG | HEIGHT: 67 IN | BODY MASS INDEX: 21.19 KG/M2 | WEIGHT: 135 LBS | SYSTOLIC BLOOD PRESSURE: 116 MMHG | TEMPERATURE: 97.9 F

## 2021-11-12 DIAGNOSIS — M24.9 HYPERMOBILE JOINTS: ICD-10-CM

## 2021-11-12 DIAGNOSIS — R42 DIZZINESS: ICD-10-CM

## 2021-11-12 DIAGNOSIS — G43.919 INTRACTABLE MIGRAINE WITHOUT STATUS MIGRAINOSUS, UNSPECIFIED MIGRAINE TYPE: ICD-10-CM

## 2021-11-12 DIAGNOSIS — Z00.00 WELL ADULT EXAM: Primary | ICD-10-CM

## 2021-11-12 DIAGNOSIS — M24.9 HYPERMOBILE JOINTS: Primary | ICD-10-CM

## 2021-11-12 LAB
APPEARANCE UR: CLEAR
BILIRUB UR QL: NEGATIVE
COLOR UR: NORMAL
GLUCOSE UR STRIP.AUTO-MCNC: NEGATIVE MG/DL
HGB UR QL STRIP: NEGATIVE
KETONES UR QL STRIP.AUTO: NEGATIVE MG/DL
LEUKOCYTE ESTERASE UR QL STRIP.AUTO: NEGATIVE
NITRITE UR QL STRIP.AUTO: NEGATIVE
PH UR STRIP: 6.5 [PH] (ref 5–8)
PROT UR STRIP-MCNC: NEGATIVE MG/DL
SP GR UR REFRACTOMETRY: 1.01 (ref 1–1.03)
UROBILINOGEN UR QL STRIP.AUTO: 0.2 EU/DL (ref 0.2–1)

## 2021-11-12 PROCEDURE — 99213 OFFICE O/P EST LOW 20 MIN: CPT | Performed by: INTERNAL MEDICINE

## 2021-11-12 PROCEDURE — 99395 PREV VISIT EST AGE 18-39: CPT | Performed by: INTERNAL MEDICINE

## 2021-11-12 NOTE — PROGRESS NOTES
Diagnoses and all orders for this visit:    1. Well adult exam  Patient appears in overall very good health. She will be transitioning to pediatrics emergency room dayshift    Labs from patient first CBC and BMP were within normal limits  EKG within normal limits    2. Dizziness, allusion vertigo  Chronic history suggestive of POTS  Discussed with patient and will try to increase her fluid intake to 2 L/day and salt to 6 g/day  Will have her see cardiology for tilt testing  -     TSH 3RD GENERATION; Future  -     T4, FREE; Future  -     URINALYSIS W/ RFLX MICROSCOPIC; Future    3. Hypermobile joints  -     GELCAIO, DIRECT, W/REFLEX; Future  -     SED RATE (ESR); Future  Beighton score for joint hypermobility  On gross testing appears to have generalized hypermobility     Migraines  Stable  Will follow up with Leon Amin    Chief Complaint   Patient presents with    Physical     patient also has dizziness     Patient presents for a physical. Her prior visits were virtual.  She is transitioning to a pediatrics emergency room dayshift. Hopefully this will help her migraines. Dizziness  Trying not to take adderall every day, asociated with migraines  Only taking at work now because of potential side effects  Will get tension in her neck when she takes Adderall. She notes that as a stimulant it seems to increase the tension in her neck  Dizzy for 6 months. Illusion not every day      Syncope  Illusion vertigo  She has a long history of what she calls, blacking out. When stands up vision deceases black to pinhole, 10 seconds then resolves  Happening more frequently   Had since when she was a child.  No seizure history  Denies palpitations  bp fine  Urgent care: labs and ekg both were normal      Migraines  Seeing Princess Ambrocio in December  She is stable on Maxalt and hoping that her frequency will decrease with a stable day shift physician           Review of Systems  Constitutional: negative for fevers, chills, anorexia and weight loss  Eyes:   negative for visual disturbance and irritation  ENT:   negative for tinnitus,sore throat,nasal congestion,ear pains. hoarseness  Respiratory:  negative for cough, hemoptysis, dyspnea,wheezing  CV:   negative for chest pain, palpitations, lower extremity edema  GI:   negative for nausea, vomiting, diarrhea, abdominal pain,melena  Endo:               negative for polyuria,polydipsia,polyphagia,heat intolerance  Genitourinary: negative for frequency, dysuria and hematuria  Integument:  negative for rash and pruritus  Hematologic:  negative for easy bruising and gum/nose bleeding  Musculoskel: negative for myalgias, arthralgias, back pain, muscle weakness, joint pain  Neurological:  negative for headaches, dizziness, vertigo, memory problems and gait   Behavl/Psych: negative for feelings of anxiety, depression, mood changes    Past Medical History:   Diagnosis Date    Anxiety     prn clonazepam    Headache     controlled with magnesium and prn maxalt     History reviewed. No pertinent surgical history. 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 8 Tablet 0    dextroamphetamine-amphetamine (ADDERALL) 5 mg tablet Take 1 Tablet by mouth daily. Max Daily Amount: 5 mg. teva brand only please  Indications: attention deficit disorder with hyperactivity 90 Tablet 0    dextroamphetamine-amphetamine (ADDERALL) 7.5 mg tablet Take 1 Tablet by mouth six (6) days a week. Max Daily Amount: 7.5 mg. In the am. teva brand only please 26 Tablet 0    albuterol (PROVENTIL HFA, VENTOLIN HFA, PROAIR HFA) 90 mcg/actuation inhaler Take 1 Puff by inhalation every four (4) hours as needed for Wheezing. 1 Inhaler 6    calcium-cholecalciferol, d3, (CALCIUM 600 + D) 600-125 mg-unit tab Take 1,000 mg by mouth.  zinc sulfate (ZINC-15 PO) Take  by mouth.  ascorbic acid, vitamin C, (VITAMIN C) 500 mg tablet Take  by mouth.  ibuprofen (MOTRIN) 600 mg tablet Take  by mouth every six (6) hours as needed for Pain.  rizatriptan (MAXALT-MLT) 10 mg disintegrating tablet Take 1 Tablet by mouth once as needed for Migraine for up to 1 dose. 9 Tablet 0    L-THREONINE 150 mg by Does Not Apply route.  magnesium gluconate 500 mg (27 mg  elemental) tablet Take 1 Tab by mouth daily. (Patient not taking: Reported on 11/12/2021) 90 Tab 1    MULTIVITAMIN PO Take 1 Tab by mouth daily.        Allergies   Allergen Reactions    Dilaudid [Hydromorphone] Hives       Objective:  Visit Vitals  /72 (BP 1 Location: Left upper arm, BP Patient Position: Sitting, BP Cuff Size: Adult)   Pulse 92   Temp 97.9 °F (36.6 °C) (Oral)   Resp 16   Ht 5' 7\" (1.702 m)   Wt 135 lb (61.2 kg)   LMP 11/09/2021 (Exact Date)   SpO2 98%   BMI 21.14 kg/m²     Physical Exam:   General appearance - alert, well appearing, and in no distress  Mental status - alert, oriented to person, place, and time  EYE-GEORGE, EOMI, corneas normal, no foreign bodies, visual acuity normal both eyes, no periorbital cellulitis  ENT-ENT exam normal, no neck nodes or sinus tenderness  Nose - normal and patent, no erythema, discharge or polyps  Mouth - mucous membranes moist, pharynx normal without lesions  Neck - supple, no significant adenopathy   Chest - clear to auscultation, no wheezes, rales or rhonchi, symmetric air entry   Heart - normal rate, regular rhythm, normal S1, S2, no murmurs, rubs, clicks or gallops   Abdomen - soft, nontender, nondistended, no masses or organomegaly  Lymph- no adenopathy palpable  Ext-peripheral pulses normal, no pedal edema, no clubbing or cyanosis, hypermobility/Kamaljit evaluated. She does not hyperextend 10 degrees with elbows or knees  Skin-Warm and dry. no hyperpigmentation, vitiligo, or suspicious lesions  Neuro -alert, oriented, normal speech, no focal findings or movement disorder noted  Neck-normal C-spine, no tenderness, full ROM without pain      Results for orders placed or performed in visit on 11/05/20   RPR   Result Value Ref Range    RPR NONREACTIVE NONREACTIVE       Prevention    Cardiovascular profile  Family hx  Exercising:  Walking and hiking  Blood pressure:  Health healthy diet:  Diabetes:  Cholesterol:  Renal function:      Cancer risk profile  Mammogram breast stable  Lung  Colonoscopy  Skin nonhealing in 2 weeks  Gyn abnormal bleeding/discharge/abd pain/pressure      Thyroid sx    Osteopenia prevention  Calcium 1000mg/day yes  Vitamin D 800iu/day yes    Mental health scale:  tranistion to [facility] pediatrics DAy shift ER, 9-9pm x 3 days  Depression  Anxiety  Sleep # of hours:  Energy Level:        Immunizations  TDAP  Pneumonia vaccine  Flu vaccine  Shingles vaccine  HPV            This medical record was transcribed using an electronic medical records/speech recognition system.   Although proofread, it may and can contain electronic, spelling and other errors. Corrections may be executed at a later time. Please contact us for any clarifications as needed. Aside from patient's physical visit on this date 11/12/21  I have spent 25 minutes reviewing previous notes, test results and face to face with the patient discussing the diagnosis and importance of compliance with the treatment plan as well as documenting on the day of the visit.

## 2021-11-13 LAB
ERYTHROCYTE [SEDIMENTATION RATE] IN BLOOD: 11 MM/HR (ref 0–20)
T4 FREE SERPL-MCNC: 1.1 NG/DL (ref 0.8–1.5)
TSH SERPL DL<=0.05 MIU/L-ACNC: 2.24 UIU/ML (ref 0.36–3.74)

## 2021-11-15 DIAGNOSIS — R42 POSTURAL DIZZINESS WITH PRESYNCOPE: Primary | ICD-10-CM

## 2021-11-15 DIAGNOSIS — M24.80 GENERALIZED HYPERMOBILITY OF JOINTS: ICD-10-CM

## 2021-11-15 DIAGNOSIS — R55 POSTURAL DIZZINESS WITH PRESYNCOPE: Primary | ICD-10-CM

## 2021-11-15 LAB — ANA SER QL: NEGATIVE

## 2021-11-15 NOTE — TELEPHONE ENCOUNTER
From: Rebel Aviles  To: Edgard Avila MD  Sent: 11/12/2021 3:42 PM EST  Subject: Rosemarie Pugh,    I reached out to my coworker and she said she was diagnosed EDS by Dr. Holli Carias with [facility]. She says she hated him but he did diagnose her. Her mother, who also has EDS, is seen by Dr. Morteza Mcdonald. I believe hes with [facility] but not sure. One thing she also mentioned is that she sees Nephrology for her POTS. Granted, we dont know if thats whats going on with me. But I thought it was worth mentioning. Let me know which Cardiology MD I should see for tilt table.     Thanks again,    [facility]

## 2021-11-23 ENCOUNTER — OFFICE VISIT (OUTPATIENT)
Dept: NEUROLOGY | Age: 31
End: 2021-11-23
Payer: COMMERCIAL

## 2021-11-23 DIAGNOSIS — R42 DIZZINESS: Primary | ICD-10-CM

## 2021-11-23 PROCEDURE — 93660 TILT TABLE EVALUATION: CPT | Performed by: PSYCHIATRY & NEUROLOGY

## 2021-11-24 ENCOUNTER — PATIENT MESSAGE (OUTPATIENT)
Dept: INTERNAL MEDICINE CLINIC | Age: 31
End: 2021-11-24

## 2021-11-24 DIAGNOSIS — G43.819 OTHER MIGRAINE WITHOUT STATUS MIGRAINOSUS, INTRACTABLE: ICD-10-CM

## 2021-11-24 DIAGNOSIS — F90.2 ATTENTION DEFICIT HYPERACTIVITY DISORDER (ADHD), COMBINED TYPE: ICD-10-CM

## 2021-11-24 DIAGNOSIS — F41.8 SITUATIONAL ANXIETY: ICD-10-CM

## 2021-11-24 RX ORDER — RIZATRIPTAN BENZOATE 10 MG/1
10 TABLET, ORALLY DISINTEGRATING ORAL
Qty: 9 TABLET | Refills: 0 | Status: SHIPPED | OUTPATIENT
Start: 2021-11-24 | End: 2022-01-27 | Stop reason: SDUPTHER

## 2021-11-24 RX ORDER — RIZATRIPTAN BENZOATE 10 MG/1
10 TABLET, ORALLY DISINTEGRATING ORAL
Qty: 9 TABLET | Refills: 0 | Status: CANCELLED | OUTPATIENT
Start: 2021-11-24 | End: 2021-11-24

## 2021-11-24 RX ORDER — CLONAZEPAM 0.5 MG/1
TABLET ORAL
Qty: 8 TABLET | Refills: 0 | Status: SHIPPED | OUTPATIENT
Start: 2021-11-24 | End: 2022-01-27 | Stop reason: SDUPTHER

## 2021-11-24 RX ORDER — RIZATRIPTAN BENZOATE 10 MG/1
10 TABLET, ORALLY DISINTEGRATING ORAL
Qty: 9 TABLET | Refills: 0 | Status: SHIPPED | OUTPATIENT
Start: 2021-11-24 | End: 2021-11-24

## 2021-11-24 RX ORDER — DEXTROAMPHETAMINE SACCHARATE, AMPHETAMINE ASPARTATE, DEXTROAMPHETAMINE SULFATE AND AMPHETAMINE SULFATE 1.875; 1.875; 1.875; 1.875 MG/1; MG/1; MG/1; MG/1
7.5 TABLET ORAL
Qty: 26 TABLET | Refills: 0 | Status: SHIPPED | OUTPATIENT
Start: 2021-11-24 | End: 2022-01-27 | Stop reason: SDUPTHER

## 2021-11-24 RX ORDER — DEXTROAMPHETAMINE SACCHARATE, AMPHETAMINE ASPARTATE, DEXTROAMPHETAMINE SULFATE AND AMPHETAMINE SULFATE 1.25; 1.25; 1.25; 1.25 MG/1; MG/1; MG/1; MG/1
5 TABLET ORAL DAILY
Qty: 90 TABLET | Refills: 0 | Status: SHIPPED | OUTPATIENT
Start: 2021-11-24 | End: 2022-03-14 | Stop reason: SDUPTHER

## 2021-11-24 NOTE — TELEPHONE ENCOUNTER
From: Belkys GARZA  To: Denisa Barreraia  Sent: 11/24/2021 12:07 PM EST  Subject: Refills    Sincere Warner,   We received your refill request, are you out of your medication? Dr. Agusto Hall is out of the office until Monday.    Belkys

## 2021-11-24 NOTE — TELEPHONE ENCOUNTER
profile was accessed online for Shoaib and reviewed by me during this encounter. I did not see evidence of inappropriate or suspicious controlled substance prescription activity.     Refills sent

## 2021-12-01 ENCOUNTER — OFFICE VISIT (OUTPATIENT)
Dept: NEUROLOGY | Age: 31
End: 2021-12-01
Payer: COMMERCIAL

## 2021-12-01 VITALS
BODY MASS INDEX: 24.22 KG/M2 | HEIGHT: 67 IN | SYSTOLIC BLOOD PRESSURE: 126 MMHG | DIASTOLIC BLOOD PRESSURE: 66 MMHG | HEART RATE: 99 BPM | TEMPERATURE: 97.8 F | OXYGEN SATURATION: 96 % | WEIGHT: 154.3 LBS

## 2021-12-01 DIAGNOSIS — M35.9 DISORDER OF CONNECTIVE TISSUE (HCC): ICD-10-CM

## 2021-12-01 DIAGNOSIS — M24.9 HYPERMOBILITY OF JOINT: ICD-10-CM

## 2021-12-01 DIAGNOSIS — G43.909 MIGRAINE WITHOUT STATUS MIGRAINOSUS, NOT INTRACTABLE, UNSPECIFIED MIGRAINE TYPE: Primary | ICD-10-CM

## 2021-12-01 PROCEDURE — 99245 OFF/OP CONSLTJ NEW/EST HI 55: CPT | Performed by: NURSE PRACTITIONER

## 2021-12-01 RX ORDER — PREDNISONE 10 MG/1
TABLET ORAL
Qty: 42 TABLET | Refills: 0 | Status: SHIPPED | OUTPATIENT
Start: 2021-12-01 | End: 2021-12-08 | Stop reason: SDUPTHER

## 2021-12-01 RX ORDER — ONABOTULINUMTOXINA 200 [USP'U]/1
INJECTION, POWDER, LYOPHILIZED, FOR SOLUTION INTRADERMAL; INTRAMUSCULAR
Qty: 200 UNITS | Refills: 5 | Status: SHIPPED | OUTPATIENT
Start: 2021-12-01 | End: 2022-01-27 | Stop reason: SDUPTHER

## 2021-12-02 NOTE — PROCEDURES
[facility]  [address] [facility], [address], [address] [facility]  Phone: [phone]  FAX: [phone]     Clinical Autonomic Testing Report     Patient ID:  Saul Randolph  [id number]  32 y.o.  1990     REFERRED BY: PCP  PCP: Giovanni Thorpe MD    Date of Testin2021       Indication/History:     Several years of episodes od dizziness and blacking ut when standing. (+) dry eyes. Patient is coming for syncope/autonomic dysfunction evaluation. Medications taken 48 hrs before the test: None     Procedure: Referring provided only requested Head-Up Tilt Table Testing. It is performed by utilizing [address] [place] [address] [employer]onomic System, with established protocol. Result:HUT (head-up tilt) : [id number] BP and HR were measured, up to 15 minutes post tilt. No significant BP reduction or HR acceleration/deceleration. Impression:   NORMAL    No orthostatic hypotension or significant tachycardia noted.          Jacqui Becerra MD  Diplomate, American Board of Psychiatry and Neurology  Diplomate, Neuromuscular Medicine  Diplomate, American Board of Electrodiagnostic Medicine    Note: Raw Data will be scanned separately in Media

## 2021-12-02 NOTE — PROGRESS NOTES
Pavel De is a 32 y.o. female who presents with the following  Chief Complaint   Patient presents with    Migraine     9-10/month with daily headaches. usually 9/10 severity with light sensitivity/ vomiting. HPI       New patient with migraines, chronic headaches. She has about 30 headache days a month   Lasting all day and night  About half of these are migraine in nature  She states they usually come up from the base of the occipital region and on the right side of the head. She does have tension, tightness in shoulders and does feel like some of this is making things worse. 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 Has used multiple preventatives over the years with no relief. She can not take BP medications due to hypotension. She states they migraines can last all day. She uses Maxalt or OTC and feels like she may use this too much   These do dull the headaches down. She does have nausea, visual sensitive, dizziness. The dizziness does come and go with and without headaches  Had ANS and awaiting results to be read. Significant family HX of migraines  Most of these family members are on Botox. Does have some hypermobility and popping in joints, etc.   She has been unable to find a rheumatologist   Does have family hx of connective tissue disorder. Allergies   Allergen Reactions    Dilaudid [Hydromorphone] Hives    Soap Hives, Rash and Swelling     Allergic to some soaps       Current Outpatient Medications   Medication Sig    onabotulinumtoxinA (Botox) 200 unit injection Inject 155 units into 31 FDA indicated and approved sites in the head, face, neck every 3 months for chronic migraine    ubrogepant (Ubrelvy) 100 mg tablet 1 at HA onset and repeat in 2 hours if needed. Max 2 in 24 hours BIN: [id number] PCN: [id number] GRP:[id number] ID: [id number] PLEASE RUN CARD.     predniSONE (DELTASONE) 10 mg tablet 6 po x2 days, 5 po x 2days, 4 po x 2days, 3 po x2days, 2 po x2days, 1 po x 2days    dextroamphetamine-amphetamine (ADDERALL) 7.5 mg tablet Take 1 Tablet by mouth six (6) days a week. Max Daily Amount: 7.5 mg. In the am. teva brand only please    dextroamphetamine-amphetamine (ADDERALL) 5 mg tablet Take 1 Tablet by mouth daily. Max Daily Amount: 5 mg. teva brand only please  Indications: attention deficit disorder with hyperactivity    clonazePAM (KlonoPIN) 0.5 mg tablet Take 1/2 to 1 tab daily ONLY as needed.  albuterol (PROVENTIL HFA, VENTOLIN HFA, PROAIR HFA) 90 mcg/actuation inhaler Take 1 Puff by inhalation every four (4) hours as needed for Wheezing.  calcium-cholecalciferol, d3, (CALCIUM 600 + D) 600-125 mg-unit tab Take 1,000 mg by mouth.  zinc sulfate (ZINC-15 PO) Take  by mouth.  ascorbic acid, vitamin C, (VITAMIN C) 500 mg tablet Take  by mouth.  ibuprofen (MOTRIN) 600 mg tablet Take  by mouth every six (6) hours as needed for Pain.  magnesium gluconate 500 mg (27 mg  elemental) tablet Take 1 Tab by mouth daily.  rizatriptan (MAXALT-MLT) 10 mg disintegrating tablet Take 1 Tablet by mouth once as needed for Migraine for up to 1 dose.  rizatriptan (MAXALT-MLT) 10 mg disintegrating tablet Take 1 Tablet by mouth once as needed for Migraine for up to 1 dose. No current facility-administered medications for this visit. Social History     Tobacco Use   Smoking Status Never Smoker   Smokeless Tobacco Never Used       Past Medical History:   Diagnosis Date    Anxiety     prn clonazepam    Headache     controlled with magnesium and prn maxalt       No past surgical history on file.     Family History   Problem Relation Age of Onset    Diabetes Mother         prediabetic    Hypertension Mother    [facility]ritis-osteo Father     Hypertension Father     Cancer Maternal Grandmother         lung    Breast Cancer Other     Heart Disease Neg Hx     Heart Attack Neg Hx Social History     Socioeconomic History    Marital status: SINGLE   Tobacco Use    Smoking status: Never Smoker    Smokeless tobacco: Never Used   Vaping Use    Vaping Use: Never used   Substance and Sexual Activity    Alcohol use: Yes     Alcohol/week: 3.0 standard drinks     Types: 1 Glasses of wine, 1 Cans of beer, 1 Shots of liquor per week     Comment: occasional weekend    Drug use: No    Sexual activity: Yes     Partners: Male     Birth control/protection: Condom     Comment: followed by [facility]   Social History Narrative    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 Eyes: Positive for blurred vision and photophobia. Negative for double vision. Respiratory: Negative for shortness of breath and wheezing. Cardiovascular: Negative for chest pain and palpitations. Gastrointestinal: Positive for nausea and vomiting. Neurological: Positive for dizziness and headaches. Negative for seizures and loss of consciousness. Remainder of comprehensive review is negative. Physical Exam :    Visit Vitals  /66   Pulse 99   Temp 97.8 °F (36.6 °C)   Ht 5' 7\" (1.702 m)   Wt 70 kg (154 lb 4.8 oz)   LMP 11/09/2021 (Exact Date)   SpO2 96%   BMI 24.17 kg/m²       General: Well defined, nourished, and groomed individual in no acute distress.    Neck: Supple, nontender, no bruits, no pain with resistance to active range of motion.    Musculoskeletal: Extremities revealed no edema and had full range of motion of joints.    Psych: Good mood and bright affect    NEUROLOGICAL EXAMINATION:    Mental Status: Alert and oriented to person, place, and time    Cranial Nerves:    II, III, IV, VI: Visual acuity grossly intact.  Visual fields are normal.    Pupils are equal, round, and reactive to light and accommodation.    Extra-ocular movements are full and fluid. Fundoscopic exam was benign, no ptosis or nystagmus.    V-XII: Hearing is grossly intact. Facial features are symmetric, with normal sensation and strength. The palate rises symmetrically and the tongue protrudes midline. Sternocleidomastoids 5/5. Motor Examination: Normal tone, bulk, and strength, 5/5 muscle strength throughout. Coordination: Finger to nose was normal. No resting or intention tremor    Gait and Station: Steady while walking. Normal arm swing. No pronator drift. No muscle wasting or fasiculations noted. Reflexes: DTRs 2+ throughout.           Results for orders placed or performed in visit on 11/12/21   URINALYSIS W/ RFLX MICROSCOPIC   Result Value Ref Range    Color YELLOW/STRAW      Appearance CLEAR CLEAR      Specific gravity 1.011 1.003 - 1.030      pH (UA) 6.5 5.0 - 8.0      Protein Negative Negative mg/dL    Glucose Negative Negative mg/dL    Ketone Negative Negative mg/dL    Bilirubin Negative Negative      Blood Negative Negative      Urobilinogen 0.2 0.2 - 1.0 EU/dL    Nitrites Negative Negative      Leukocyte Esterase Negative Negative     SED RATE (ESR)   Result Value Ref Range    Sed rate, automated 11 0 - 20 mm/hr   GELACIO, DIRECT, W/REFLEX   Result Value Ref Range    GELACIO, Direct Negative Negative     T4, FREE   Result Value Ref Range    T4, Free 1.1 0.8 - 1.5 NG/DL   TSH 3RD GENERATION   Result Value Ref Range    TSH 2.24 0.36 - 3.74 uIU/mL       Orders Placed This Encounter    REFERRAL TO NEUROLOGY     Referral Priority:   Routine     Referral Type:   Consultation     Referral Reason:   Specialty Services Required     Referred to Provider:   Anurag Mcnamara NP     Number of Visits Requested:   1    REFERRAL TO RHEUMATOLOGY     Referral Priority:   Routine     Referral Type:   Consultation     Referral Reason:   Specialty Services Required     Referred to Provider:   Roxene Ganser, MD     Number of Visits Requested:   1    onabotulinumtoxinA (Botox) 200 unit injection     Sig: Inject 155 units into 31 FDA indicated and approved sites in the head, face, neck every 3 months for chronic migraine     Dispense:  200 Units     Refill:  5    ubrogepant (Ubrelvy) 100 mg tablet     Si at HA onset and repeat in 2 hours if needed. Max 2 in 24 hours BIN: [id number] PCN: [id number] GRP:[id number] ID: [id number] PLEASE RUN CARD. Dispense:  48 Tablet     Refill:  1    predniSONE (DELTASONE) 10 mg tablet     Si po x2 days, 5 po x 2days, 4 po x 2days, 3 po x2days, 2 po x2days, 1 po x 2days     Dispense:  42 Tablet     Refill:  0       1. Migraine without status migrainosus, not intractable, unspecified migraine type    2. Disorder of connective tissue ([facility] [address]    3. Hypermobility of joint      Chronic migraine. Initiate BOTOX for migraine prevention as failed previous preventatives in AED, SSRI and can not take any BP medications due to hypotension. With over 20 headache days a month she will greatly benefit  Try Prednisone to break HA cycle and reset   Try to use Ubrelvy for PRN And not any OTC, Maxalt as no rebound, overuse.      Refer to [facility] rheumatology to look at EDS vs. Hypermobility               This note will not be viewable in MyChart

## 2021-12-08 ENCOUNTER — TELEPHONE (OUTPATIENT)
Dept: NEUROLOGY | Age: 31
End: 2021-12-08

## 2021-12-08 RX ORDER — PREDNISONE 10 MG/1
TABLET ORAL
Qty: 42 TABLET | Refills: 0 | Status: SHIPPED | OUTPATIENT
Start: 2021-12-08 | End: 2022-03-11 | Stop reason: SDUPTHER

## 2021-12-10 RX ORDER — ZOLMITRIPTAN 5 MG/1
TABLET, ORALLY DISINTEGRATING ORAL
Qty: 6 TABLET | Refills: 2 | Status: SHIPPED | OUTPATIENT
Start: 2021-12-10 | End: 2022-02-22 | Stop reason: SDUPTHER

## 2021-12-20 ENCOUNTER — TELEPHONE (OUTPATIENT)
Dept: NEUROLOGY | Age: 31
End: 2021-12-20

## 2021-12-22 ENCOUNTER — TELEPHONE (OUTPATIENT)
Dept: NEUROLOGY | Age: 31
End: 2021-12-22

## 2021-12-22 DIAGNOSIS — M35.9 DISORDER OF CONNECTIVE TISSUE (HCC): Primary | ICD-10-CM

## 2021-12-22 DIAGNOSIS — M25.50 ARTHRALGIA, UNSPECIFIED JOINT: ICD-10-CM

## 2021-12-22 DIAGNOSIS — M24.9 HYPERMOBILITY OF JOINT: ICD-10-CM

## 2021-12-22 NOTE — TELEPHONE ENCOUNTER
Got fax number [phone] off the [email or web address] website, but faxed referral several times from both fax machines and the fax does not go through. Called [facility] and requested call back with fax number so referral can be sent.

## 2021-12-27 ENCOUNTER — PATIENT MESSAGE (OUTPATIENT)
Dept: NEUROLOGY | Age: 31
End: 2021-12-27

## 2021-12-29 NOTE — TELEPHONE ENCOUNTER
From: Koby Britt  Sent: 12/29/2021 9:32 AM EST  To: [facility] Nurse Pool  Subject: appointment    I dont, I wont be active for 30 days after start of employment. So Im out of insurance until 01/27/22.

## 2022-01-20 ENCOUNTER — PATIENT MESSAGE (OUTPATIENT)
Dept: NEUROLOGY | Age: 32
End: 2022-01-20

## 2022-01-27 ENCOUNTER — PATIENT MESSAGE (OUTPATIENT)
Dept: NEUROLOGY | Age: 32
End: 2022-01-27

## 2022-01-27 DIAGNOSIS — M35.9 DISORDER OF CONNECTIVE TISSUE (HCC): Primary | ICD-10-CM

## 2022-01-27 DIAGNOSIS — M25.50 ARTHRALGIA, UNSPECIFIED JOINT: ICD-10-CM

## 2022-01-27 DIAGNOSIS — G43.819 OTHER MIGRAINE WITHOUT STATUS MIGRAINOSUS, INTRACTABLE: ICD-10-CM

## 2022-01-27 RX ORDER — RIZATRIPTAN BENZOATE 10 MG/1
10 TABLET, ORALLY DISINTEGRATING ORAL
Qty: 9 TABLET | Refills: 0 | Status: SHIPPED | OUTPATIENT
Start: 2022-01-27 | End: 2022-03-07 | Stop reason: SDUPTHER

## 2022-01-27 RX ORDER — ONABOTULINUMTOXINA 200 [USP'U]/1
INJECTION, POWDER, LYOPHILIZED, FOR SOLUTION INTRADERMAL; INTRAMUSCULAR
Qty: 200 UNITS | Refills: 5 | Status: SHIPPED | OUTPATIENT
Start: 2022-01-27 | End: 2022-05-31 | Stop reason: ALTCHOICE

## 2022-02-02 ENCOUNTER — TELEPHONE (OUTPATIENT)
Dept: NEUROLOGY | Age: 32
End: 2022-02-02

## 2022-02-02 NOTE — TELEPHONE ENCOUNTER
Since she is a [facility] patient and her insurance switched to [id number] [facility], does she need a new PA for Botox?

## 2022-02-14 ENCOUNTER — TELEPHONE (OUTPATIENT)
Dept: NEUROLOGY | Age: 32
End: 2022-02-14

## 2022-02-15 ENCOUNTER — TELEPHONE (OUTPATIENT)
Dept: NEUROLOGY | Age: 32
End: 2022-02-15

## 2022-02-16 ENCOUNTER — PATIENT MESSAGE (OUTPATIENT)
Dept: NEUROLOGY | Age: 32
End: 2022-02-16

## 2022-02-16 NOTE — TELEPHONE ENCOUNTER
Spoke with Susan Hansen, [facility]. They will send out the Botox on 2/21/22 with delivery on 2/22/22.

## 2022-02-22 ENCOUNTER — PATIENT MESSAGE (OUTPATIENT)
Dept: NEUROLOGY | Age: 32
End: 2022-02-22

## 2022-02-22 RX ORDER — ZOLMITRIPTAN 5 MG/1
TABLET, ORALLY DISINTEGRATING ORAL
Qty: 6 TABLET | Refills: 2 | Status: SHIPPED | OUTPATIENT
Start: 2022-02-22 | End: 2022-04-18

## 2022-02-24 ENCOUNTER — PATIENT MESSAGE (OUTPATIENT)
Dept: NEUROLOGY | Age: 32
End: 2022-02-24

## 2022-03-07 ENCOUNTER — OFFICE VISIT (OUTPATIENT)
Dept: NEUROLOGY | Age: 32
End: 2022-03-07
Payer: COMMERCIAL

## 2022-03-07 ENCOUNTER — TELEPHONE (OUTPATIENT)
Dept: NEUROLOGY | Age: 32
End: 2022-03-07

## 2022-03-07 DIAGNOSIS — G43.909 MIGRAINE WITHOUT STATUS MIGRAINOSUS, NOT INTRACTABLE, UNSPECIFIED MIGRAINE TYPE: ICD-10-CM

## 2022-03-07 DIAGNOSIS — M79.18 MYOFASCIAL PAIN: Primary | ICD-10-CM

## 2022-03-07 DIAGNOSIS — M54.2 NECK PAIN: ICD-10-CM

## 2022-03-07 DIAGNOSIS — G43.819 OTHER MIGRAINE WITHOUT STATUS MIGRAINOSUS, INTRACTABLE: ICD-10-CM

## 2022-03-07 PROCEDURE — 64615 CHEMODENERV MUSC MIGRAINE: CPT | Performed by: NURSE PRACTITIONER

## 2022-03-07 RX ORDER — RIZATRIPTAN BENZOATE 10 MG/1
TABLET, ORALLY DISINTEGRATING ORAL
Qty: 12 TABLET | Refills: 5 | Status: SHIPPED | OUTPATIENT
Start: 2022-03-07 | End: 2022-04-13 | Stop reason: SDUPTHER

## 2022-03-07 NOTE — PROGRESS NOTES
[facility]  OFFICE PROCEDURE PROGRESS NOTE        Chart reviewed for the following:   John REYES NP, have reviewed the History, Physical and updated the Allergic reactions for Alana Salgado0 [facility] performed immediately prior to start of procedure:   John REYES NP, have performed the following reviews on [address] prior to the start of the procedure:            * Patient was identified by name and date of birth   * Agreement on procedure being performed was verified  * Risks and Benefits explained to the patient  * Procedure site verified and marked as necessary  * Patient was positioned for comfort  * Consent was signed and verified     Time: 1400      Date of procedure: 3/7/2022    Procedure performed by:  Perico Vaz NP    Provider assisted by: None    Patient assisted by: None    How tolerated by patient: tolerated the procedure well with no complications    Post Procedural Pain Scale: 2 - Hurts Little Bit    Comments: None      Botox Injection Note       Indication: patient has chronic recurrent migraine, has 7-10 less migraine days per month with botox injections    Procedure:   Botox concentration: 200 units in 4 ml of preservative-free normal saline. 31 sites injections, distribution as follow      Units/site  Sites Sides Subtotal    Procerus 5 1 1 5    5 1 2 10   Frontalis 5 2 2 20   Temporalis 5 4 2 40   Occipitalis 5 3 2 30   Upper cervical paraspinalis 5 2 2 20   Trapezius 5 3 2 30         200 units Botox were reconstituted, 155 units injected as above and the remainder was unavoidably wasted.      Patient tolerated procedure well.     _____________________________   Franchot

## 2022-03-11 RX ORDER — PREDNISONE 10 MG/1
TABLET ORAL
Qty: 42 TABLET | Refills: 0 | Status: SHIPPED | OUTPATIENT
Start: 2022-03-11 | End: 2022-04-18

## 2022-03-11 RX ORDER — KETOROLAC TROMETHAMINE 10 MG/1
TABLET, FILM COATED ORAL
Qty: 20 TABLET | Refills: 1 | Status: SHIPPED | OUTPATIENT
Start: 2022-03-11 | End: 2022-05-31 | Stop reason: ALTCHOICE

## 2022-03-14 DIAGNOSIS — F90.2 ATTENTION DEFICIT HYPERACTIVITY DISORDER (ADHD), COMBINED TYPE: ICD-10-CM

## 2022-03-15 RX ORDER — DEXTROAMPHETAMINE SACCHARATE, AMPHETAMINE ASPARTATE, DEXTROAMPHETAMINE SULFATE AND AMPHETAMINE SULFATE 1.875; 1.875; 1.875; 1.875 MG/1; MG/1; MG/1; MG/1
7.5 TABLET ORAL
Qty: 26 TABLET | Refills: 0 | Status: SHIPPED | OUTPATIENT
Start: 2022-03-15 | End: 2022-04-04

## 2022-03-15 RX ORDER — DEXTROAMPHETAMINE SACCHARATE, AMPHETAMINE ASPARTATE, DEXTROAMPHETAMINE SULFATE AND AMPHETAMINE SULFATE 1.25; 1.25; 1.25; 1.25 MG/1; MG/1; MG/1; MG/1
5 TABLET ORAL DAILY
Qty: 90 TABLET | Refills: 0 | Status: SHIPPED | OUTPATIENT
Start: 2022-03-15 | End: 2022-06-03 | Stop reason: SDUPTHER

## 2022-03-24 ENCOUNTER — TELEPHONE (OUTPATIENT)
Dept: INTERNAL MEDICINE CLINIC | Age: 32
End: 2022-03-24

## 2022-03-24 ENCOUNTER — APPOINTMENT (OUTPATIENT)
Dept: INTERNAL MEDICINE CLINIC | Age: 32
End: 2022-03-24

## 2022-03-24 NOTE — TELEPHONE ENCOUNTER
I called pt twice no answer. LM on VM that we have been trying to contact her multiple times to set up her virtual video visit today but it goes straight to voicemail.  5minutes message also sent

## 2022-04-04 ENCOUNTER — VIRTUAL VISIT (OUTPATIENT)
Dept: INTERNAL MEDICINE CLINIC | Age: 32
End: 2022-04-04
Payer: COMMERCIAL

## 2022-04-04 DIAGNOSIS — F98.8 ATTENTION DEFICIT DISORDER (ADD) WITHOUT HYPERACTIVITY: ICD-10-CM

## 2022-04-04 DIAGNOSIS — G43.819 OTHER MIGRAINE WITHOUT STATUS MIGRAINOSUS, INTRACTABLE: Primary | ICD-10-CM

## 2022-04-04 DIAGNOSIS — R42 DIZZINESS: ICD-10-CM

## 2022-04-04 DIAGNOSIS — F90.2 ATTENTION DEFICIT HYPERACTIVITY DISORDER (ADHD), COMBINED TYPE: ICD-10-CM

## 2022-04-04 DIAGNOSIS — F41.8 SITUATIONAL ANXIETY: ICD-10-CM

## 2022-04-04 PROCEDURE — 99213 OFFICE O/P EST LOW 20 MIN: CPT | Performed by: INTERNAL MEDICINE

## 2022-04-04 RX ORDER — DEXTROAMPHETAMINE SACCHARATE, AMPHETAMINE ASPARTATE, DEXTROAMPHETAMINE SULFATE AND AMPHETAMINE SULFATE 1.875; 1.875; 1.875; 1.875 MG/1; MG/1; MG/1; MG/1
TABLET ORAL
Qty: 60 TABLET | Refills: 0 | Status: SHIPPED | OUTPATIENT
Start: 2022-07-15 | End: 2022-06-03

## 2022-04-04 RX ORDER — CLONAZEPAM 0.5 MG/1
TABLET ORAL
Qty: 8 TABLET | Refills: 0 | Status: SHIPPED | OUTPATIENT
Start: 2022-04-04 | End: 2022-05-12 | Stop reason: SDUPTHER

## 2022-04-04 RX ORDER — DEXTROAMPHETAMINE SACCHARATE, AMPHETAMINE ASPARTATE, DEXTROAMPHETAMINE SULFATE AND AMPHETAMINE SULFATE 1.875; 1.875; 1.875; 1.875 MG/1; MG/1; MG/1; MG/1
7.5 TABLET ORAL
Qty: 60 TABLET | Refills: 0 | Status: SHIPPED | OUTPATIENT
Start: 2022-04-15 | End: 2022-06-03 | Stop reason: SDUPTHER

## 2022-04-04 NOTE — PROGRESS NOTES
Diagnoses and all orders for this visit:    1. Other migraine without status migrainosus, intractable  Stable  Working with Zoraida Chahal Balbir and good rapport  Continue on Botox  Consider Fioricet x2 if migraine occurrence    2. Attention deficit disorder (ADD) without hyperactivity  Current regimen appears to be doing very well for patient. She denies side effects. She is taking Adderall 7.5 mg 4 to 5 days/week and then using Adderall 5 mg on the days she is working along with a 7.5 mg if needed given her longer shifts. The other days she is 5 mg to help her get her work done    3. Attention deficit hyperactivity disorder (ADHD), combined type  -     dextroamphetamine-amphetamine (ADDERALL) 7.5 mg tablet; Take 1 Tablet by mouth five (5) days a week. Max Daily Amount: 7.5 mg. In the am. teva brand only please  -     dextroamphetamine-amphetamine (ADDERALL) 7.5 mg tablet; Take 1 po 5 days/weekIn the am. teva brand only please. Dizziness  History suggestive of orthostasis but also a vestibular component. We will have her discuss with Carlos Ramírez to see if possibly vestibular migraine and if negative then potentially can see ENT Dr. Flaco Rodriguez may         encoruaged pap/pelvic with gyn. She is aware    Chief Complaint   Patient presents with    Medication Check       Migraine  She has been working with neurology. She had Botox and had a terrible migraine but after that no more migraines. She has not had one in 2 weeks. She tried many things to abort but would not help. 1 Fioricet helped to decrease the symptoms and then eventually resolved. Dizziness  Tilt was negative  orthostasis and vertigo/ringing      Adderall  She is currently working 11-11 shifts 3 days a week. She is using the Adderall regimen which seems to work for her. She is taking 7.5 with work days and 5 mg at noon and again in evening (if needed) on the days she works  Days not working will do 5 mg.   She denies palpitations or increase in anxiety. Past Medical History:   Diagnosis Date    Anxiety     prn clonazepam    Headache     controlled with magnesium and prn maxalt     History reviewed. No pertinent surgical history. 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 Max Daily Amount: 5 mg. teva brand only please  Indications: attention deficit disorder with hyperactivity 90 Tablet 0    dextroamphetamine-amphetamine (ADDERALL) 7.5 mg tablet Take 1 Tablet by mouth six (6) days a week. Max Daily Amount: 7.5 mg. In the am. teva brand only please 26 Tablet 0    rizatriptan (MAXALT-MLT) 10 mg disintegrating tablet 1 at HA onset and repeat in 2 hours if needed. Max 2 in 24 hours 12 Tablet 5    clonazePAM (KlonoPIN) 0.5 mg tablet Take 1/2 to 1 tab daily ONLY as needed.  8 Tablet 0    calcium-cholecalciferol, d3, (CALCIUM 600 + D) 600-125 mg-unit tab Take 1,000 mg by mouth.  ascorbic acid, vitamin C, (VITAMIN C) 500 mg tablet Take  by mouth.  ibuprofen (MOTRIN) 600 mg tablet Take  by mouth every six (6) hours as needed for Pain.  magnesium gluconate 500 mg (27 mg  elemental) tablet Take 1 Tab by mouth daily. 90 Tab 1    ketorolac (TORADOL) 10 mg tablet Take 1-2 tablets by mouth at HA onset. May repeat with 1 tablet every 6 hours PRN 20 Tablet 1    predniSONE (DELTASONE) 10 mg tablet 6 po x2 days, 5 po x 2days, 4 po x 2days, 3 po x2days, 2 po x2days, 1 po x 2days (Patient not taking: Reported on 4/4/2022) 42 Tablet 0    ondansetron (ZOFRAN ODT) 4 mg disintegrating tablet Take 4 mg by mouth every eight (8) hours as needed. (Patient not taking: Reported on 4/4/2022)      ZOLMitriptan (Zomig ZMT) 5 mg disintegrating tablet 1 tab at onset of migraine; take 1 tab in 2 hours if headache remains; Limit: 2 tabs in 24 hours, not more than 3 days a week. 30 Day Rx (Patient not taking: Reported on 4/4/2022) 6 Tablet 2    onabotulinumtoxinA (Botox) 200 unit injection Inject 155 units into 31 FDA indicated and approved sites in the head, face, neck every 3 months for chronic migraine 200 Units 5    ubrogepant (Ubrelvy) 100 mg tablet 1 at HA onset and repeat in 2 hours if needed. Max 2 in 24 hours BIN: [id number] PCN: [id number] GRP:[id number] ID: [id number] PLEASE RUN CARD. (Patient not taking: Reported on 4/4/2022) 48 Tablet 1    rizatriptan (MAXALT-MLT) 10 mg disintegrating tablet Take 1 Tablet by mouth once as needed for Migraine for up to 1 dose. 9 Tablet 0    albuterol (PROVENTIL HFA, VENTOLIN HFA, PROAIR HFA) 90 mcg/actuation inhaler Take 1 Puff by inhalation every four (4) hours as needed for Wheezing. 1 Inhaler 6    zinc sulfate (ZINC-15 PO) Take  by mouth.  (Patient not taking: Reported on 4/4/2022)       Allergies   Allergen Reactions    Dilaudid [Hydromorphone] Hives    Soap Hives, Rash and Swelling     Allergic to some soaps       Review of Systems - General ROS: negative for - chills or fever  Cardiovascular ROS: no chest pain or dyspnea on exertion  Respiratory ROS: no cough, shortness of breath, or wheezing    There were no vitals taken for this visit. Constitutional: [x] Appears well-developed and well-nourished [x] No apparent distress      [] Abnormal -     Mental status: [x] Alert and awake  [x] Oriented to person/place/time [x] Able to follow commands    [] Abnormal -     Eyes:   EOM    [x]  Normal    [] Abnormal -   Sclera  [x]  Normal    [] Abnormal -          Discharge [x]  None visible   [] Abnormal -     HENT: [x] Normocephalic, atraumatic  [] Abnormal -   [x] Mouth/Throat: Mucous membranes are moist    External Ears [x] Normal  [] Abnormal -    Neck: [x] No visualized mass [] Abnormal -     Pulmonary/Chest: [x] Respiratory effort normal   [x] No visualized signs of difficulty breathing or respiratory distress        [] Abnormal -      Musculoskeletal:   [x] Normal gait with no signs of ataxia         [x] Normal range of motion of neck        [] Abnormal -     Neurological:        [x] No Facial Asymmetry (Cranial nerve 7 motor function) (limited exam due to video visit)          [x] No gaze palsy        [] Abnormal -          Skin:        [x] No significant exanthematous lesions or discoloration noted on facial skin         [] Abnormal -            Psychiatric:       [x] Normal Affect [] Abnormal -        [x] No Hallucinations      ATTENTION:   This medical record was transcribed using an electronic medical records/speech recognition system. Although proofread, it may and can contain electronic, spelling and other errors. Corrections may be executed at a later time. Please contact us for any clarifications as needed. On this date 04/04/22  I have spent 25minutes reviewing previous notes, test results and face to face with the patient discussing the diagnosis and importance of compliance with the treatment plan as well as documenting on the day of the visit. Video this is a virtual visit. I was located in my office and the patient was located in his/her home. Pt has given consent and aware this visit will be billed to his/her health insurance.

## 2022-04-13 ENCOUNTER — TELEPHONE (OUTPATIENT)
Dept: NEUROLOGY | Age: 32
End: 2022-04-13

## 2022-04-13 DIAGNOSIS — G43.819 OTHER MIGRAINE WITHOUT STATUS MIGRAINOSUS, INTRACTABLE: ICD-10-CM

## 2022-04-13 RX ORDER — RIZATRIPTAN BENZOATE 10 MG/1
TABLET, ORALLY DISINTEGRATING ORAL
Qty: 12 TABLET | Refills: 5 | Status: SHIPPED | OUTPATIENT
Start: 2022-04-13 | End: 2022-05-05 | Stop reason: SDUPTHER

## 2022-04-13 NOTE — TELEPHONE ENCOUNTER
Re: Botox    See pt's insurance plan has changed. Entered new plan into botox one so we can verify benefits. Awaiting update, next appt not til May 2022 for inj.     Based on review camilla HILL is approved through 12/31/22

## 2022-04-18 ENCOUNTER — OFFICE VISIT (OUTPATIENT)
Dept: NEUROLOGY | Age: 32
End: 2022-04-18
Payer: COMMERCIAL

## 2022-04-18 VITALS
HEIGHT: 67 IN | OXYGEN SATURATION: 98 % | BODY MASS INDEX: 22.76 KG/M2 | WEIGHT: 145 LBS | DIASTOLIC BLOOD PRESSURE: 72 MMHG | SYSTOLIC BLOOD PRESSURE: 104 MMHG | RESPIRATION RATE: 16 BRPM | HEART RATE: 90 BPM

## 2022-04-18 DIAGNOSIS — G43.909 MIGRAINE WITHOUT STATUS MIGRAINOSUS, NOT INTRACTABLE, UNSPECIFIED MIGRAINE TYPE: Primary | ICD-10-CM

## 2022-04-18 DIAGNOSIS — M25.40 JOINT SWELLING: ICD-10-CM

## 2022-04-18 PROCEDURE — 99214 OFFICE O/P EST MOD 30 MIN: CPT | Performed by: NURSE PRACTITIONER

## 2022-04-18 RX ORDER — RIMEGEPANT SULFATE 75 MG/75MG
TABLET, ORALLY DISINTEGRATING ORAL
Qty: 8 TABLET | Refills: 5 | Status: SHIPPED | OUTPATIENT
Start: 2022-04-18 | End: 2022-05-11 | Stop reason: ALTCHOICE

## 2022-04-18 RX ORDER — RIMEGEPANT SULFATE 75 MG/75MG
75 TABLET, ORALLY DISINTEGRATING ORAL
COMMUNITY
End: 2022-05-11 | Stop reason: ALTCHOICE

## 2022-04-18 NOTE — PROGRESS NOTES
Ebony See is a 28 y.o. female who presents with the following  Chief Complaint   Patient presents with    Migraine     follow up after getting botox , she states she had 3 migraines in the last month, and she is doing well after botox and she states she is doing well with nurtec and needs an RX for it        HPI     BOTOX FU # 1   She had a headache for about 2 weeks after the first treatment   Broke with a Fioricet. But since then has been doing well  Down from 30 days a month to around 10 with a headache. Has had weeks with no migraine   She has been using Nurtec for PRN   She does keep track with a diary. Before Botox she was having about 30 headache days a month   Lasting all day and night  15 were migraine or more before BOTOX     She states they usually come up from the base of the occipital region and on the right side of the head. She does have tension, tightness in shoulders and does feel like some of this is making things worse. 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 Has used multiple preventatives over the years with no relief. She can not take BP medications due to hypotension.      She uses Maxalt, Nurtec if needed. She does have nausea, visual sensitive, dizziness. The dizziness does come and go with and without headaches  Had ANS and normal.   Never seen ENT       Does have some hypermobility and popping in joints, etc.   Redness. Will get to Rheumatology. Allergies   Allergen Reactions    Dilaudid [Hydromorphone] Hives    Soap Hives, Rash and Swelling     Allergic to some soaps       Current Outpatient Medications   Medication Sig    rimegepant (Nurtec ODT) 75 mg disintegrating tablet Take 75 mg by mouth once as needed for Migraine.  rimegepant (Nurtec ODT) 75 mg disintegrating tablet Take 1 tablet by mouth    rizatriptan (MAXALT-MLT) 10 mg disintegrating tablet 1 at HA onset and repeat in 2 hours if needed.   Max 2 in 24 hours    dextroamphetamine-amphetamine (ADDERALL) 7.5 mg tablet Take 1 Tablet by mouth five (5) days a week. Max Daily Amount: 7.5 mg. In the am. teva brand only please    [START ON 7/15/2022] dextroamphetamine-amphetamine (ADDERALL) 7.5 mg tablet Take 1 po 5 days/weekIn the am. teva brand only please.  clonazePAM (KlonoPIN) 0.5 mg tablet Take 1/2 to 1 tab daily ONLY as needed.  dextroamphetamine-amphetamine (ADDERALL) 5 mg tablet Take 1 Tablet by mouth daily. Max Daily Amount: 5 mg. teva brand only please  Indications: attention deficit disorder with hyperactivity    ketorolac (TORADOL) 10 mg tablet Take 1-2 tablets by mouth at HA onset. May repeat with 1 tablet every 6 hours PRN    ondansetron (ZOFRAN ODT) 4 mg disintegrating tablet Take 4 mg by mouth every eight (8) hours as needed.  onabotulinumtoxinA (Botox) 200 unit injection Inject 155 units into 31 FDA indicated and approved sites in the head, face, neck every 3 months for chronic migraine    albuterol (PROVENTIL HFA, VENTOLIN HFA, PROAIR HFA) 90 mcg/actuation inhaler Take 1 Puff by inhalation every four (4) hours as needed for Wheezing.  calcium-cholecalciferol, d3, (CALCIUM 600 + D) 600-125 mg-unit tab Take 1,000 mg by mouth.  zinc sulfate (ZINC-15 PO) Take  by mouth.  ascorbic acid, vitamin C, (VITAMIN C) 500 mg tablet Take  by mouth.  ibuprofen (MOTRIN) 600 mg tablet Take  by mouth every six (6) hours as needed for Pain.  magnesium gluconate 500 mg (27 mg  elemental) tablet Take 1 Tab by mouth daily.  rizatriptan (MAXALT-MLT) 10 mg disintegrating tablet Take 1 Tablet by mouth once as needed for Migraine for up to 1 dose. No current facility-administered medications for this visit.        Social History     Tobacco Use   Smoking Status Never Smoker   Smokeless Tobacco Never Used       Past Medical History:   Diagnosis Date    Anxiety     prn clonazepam    Headache     controlled with magnesium and prn maxalt No past surgical history on file. Family History   Problem Relation Age of Onset    Diabetes Mother         prediabetic    Hypertension Mother     OSTEOARTHRITIS Father     Hypertension Father     Cancer Maternal Grandmother         lung    Breast Cancer Other     Heart Disease Neg Hx     Heart Attack Neg Hx        Social History     Socioeconomic History    Marital status: SINGLE   Tobacco Use    Smoking status: Never Smoker    Smokeless tobacco: Never Used   Vaping Use    Vaping Use: Never used   Substance and Sexual Activity    Alcohol use: Yes     Alcohol/week: 3.0 standard drinks     Types: 1 Glasses of wine, 1 Cans of beer, 1 Shots of liquor per week     Comment: occasional weekend    Drug use: No    Sexual activity: Yes     Partners: Male     Birth control/protection: Condom     Comment: followed by [place] [facility] [facility]   Social History Narrative    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 Negative for double vision. Respiratory: Negative for shortness of breath and wheezing. Cardiovascular: Negative for chest pain and palpitations. Gastrointestinal: Positive for nausea. Negative for vomiting. Neurological: Positive for dizziness and headaches. Negative for seizures and loss of consciousness. Remainder of comprehensive review is negative.      Physical Exam :    Visit Vitals  /72   Pulse 90   Resp 16   Ht 5' 7\" (1.702 m)   Wt 65.8 kg (145 lb)   SpO2 98%   BMI 22.71 kg/m²       General: Well defined, nourished, and groomed individual in no acute distress.    Neck: Supple, nontender, no bruits, no pain with resistance to active range of motion.    Musculoskeletal: Extremities revealed no edema and had full range of motion of joints.    Psych: Good mood and bright affect    NEUROLOGICAL EXAMINATION:    Mental Status: Alert and oriented to person, place, and time    Cranial Nerves:    II, III, IV, VI: Visual acuity grossly intact. Visual fields are normal.    Pupils are equal, round, and reactive to light and accommodation.    Extra-ocular movements are full and fluid. Fundoscopic exam was benign, no ptosis or nystagmus.    V-XII: Hearing is grossly intact. Facial features are symmetric, with normal sensation and strength. The palate rises symmetrically and the tongue protrudes midline. Sternocleidomastoids 5/5. Motor Examination: Normal tone, bulk, and strength, 5/5 muscle strength throughout. Coordination: Finger to nose was normal. No resting or intention tremor    Gait and Station: Steady while walking. Normal arm swing. No pronator drift. No muscle wasting or fasiculations noted. Reflexes: DTRs 2+ throughout.             Results for orders placed or performed in visit on 11/12/21   URINALYSIS W/ RFLX MICROSCOPIC   Result Value Ref Range    Color YELLOW/STRAW      Appearance CLEAR CLEAR      Specific gravity 1.011 1.003 - 1.030      pH (UA) 6.5 5.0 - 8.0      Protein Negative Negative mg/dL    Glucose Negative Negative mg/dL    Ketone Negative Negative mg/dL    Bilirubin Negative Negative      Blood Negative Negative      Urobilinogen 0.2 0.2 - 1.0 EU/dL    Nitrites Negative Negative      Leukocyte Esterase Negative Negative     SED RATE (ESR)   Result Value Ref Range    Sed rate, automated 11 0 - 20 mm/hr   GELACIO, DIRECT, W/REFLEX   Result Value Ref Range    GELACIO, Direct Negative Negative     T4, FREE   Result Value Ref Range    T4, Free 1.1 0.8 - 1.5 NG/DL   TSH 3RD GENERATION   Result Value Ref Range    TSH 2.24 0.36 - 3.74 uIU/mL       Orders Placed This Encounter    REFERRAL TO RHEUMATOLOGY     Referral Priority:   Routine     Referral Type:   Consultation     Referral Reason:   Specialty Services Required     Referred to Provider:   Maru Sanon MD Number of Visits Requested:   1    rimegepant (Nurtec ODT) 75 mg disintegrating tablet     Sig: Take 75 mg by mouth once as needed for Migraine.  rimegepant (Nurtec ODT) 75 mg disintegrating tablet     Sig: Take 1 tablet by mouth     Dispense:  8 Tablet     Refill:  5       1. Migraine without status migrainosus, not intractable, unspecified migraine type    2. Joint swelling      Keep BOTOX for migraine prevention   She is seeing improvement with this  If another cycle post botox, try Nurtec every other day PRN   Fioricet broke cycle last time. She will keep diary for guidance    Refer to Rheumatology to look at rheumatological disease evaluation with significant family hx. She has mobility issues, joint swelling. Will refer to be evaluated further.                  This note will not be viewable in [facility]

## 2022-04-18 NOTE — PROGRESS NOTES
Chief Complaint   Patient presents with    Migraine     follow up after getting botox , she states she had 3 migraines in the last month, and she is doing well after botox and she states she is doing well with nurtec and needs an RX for it      Visit Vitals  /72   Pulse 90   Resp 16   Ht 5' 7\" (1.702 m)   Wt 65.8 kg (145 lb)   SpO2 98%   BMI 22.71 kg/m²

## 2022-04-19 ENCOUNTER — PATIENT MESSAGE (OUTPATIENT)
Dept: NEUROLOGY | Age: 32
End: 2022-04-19

## 2022-05-03 DIAGNOSIS — G43.919 INTRACTABLE MIGRAINE WITHOUT STATUS MIGRAINOSUS, UNSPECIFIED MIGRAINE TYPE: Primary | ICD-10-CM

## 2022-05-03 DIAGNOSIS — H53.9 VISUAL CHANGES: ICD-10-CM

## 2022-05-03 DIAGNOSIS — R42 DIZZINESS: ICD-10-CM

## 2022-05-04 ENCOUNTER — PATIENT MESSAGE (OUTPATIENT)
Dept: NEUROLOGY | Age: 32
End: 2022-05-04

## 2022-05-05 DIAGNOSIS — G43.819 OTHER MIGRAINE WITHOUT STATUS MIGRAINOSUS, INTRACTABLE: ICD-10-CM

## 2022-05-05 RX ORDER — BUTALBITAL, ACETAMINOPHEN AND CAFFEINE 50; 325; 40 MG/1; MG/1; MG/1
TABLET ORAL
Qty: 40 TABLET | Refills: 3 | Status: SHIPPED | OUTPATIENT
Start: 2022-05-05 | End: 2022-06-03

## 2022-05-06 ENCOUNTER — TELEPHONE (OUTPATIENT)
Dept: NEUROLOGY | Age: 32
End: 2022-05-06

## 2022-05-06 RX ORDER — RIZATRIPTAN BENZOATE 10 MG/1
TABLET, ORALLY DISINTEGRATING ORAL
Qty: 12 TABLET | Refills: 5 | Status: SHIPPED | OUTPATIENT
Start: 2022-05-06 | End: 2022-06-06 | Stop reason: SDUPTHER

## 2022-05-09 ENCOUNTER — TELEPHONE (OUTPATIENT)
Dept: INTERNAL MEDICINE CLINIC | Age: 32
End: 2022-05-09

## 2022-05-09 NOTE — TELEPHONE ENCOUNTER
I called pt to advise there was a scheduling error and can she please come in the same day but at 1140am, pt states she is off Wednesday and Thursday this week and those days work better. Appt schedule Thursday at 220pm. I thanked pt for her flexibility.

## 2022-05-10 DIAGNOSIS — M54.2 NECK PAIN: Primary | ICD-10-CM

## 2022-05-11 RX ORDER — RIMEGEPANT SULFATE 75 MG/75MG
TABLET, ORALLY DISINTEGRATING ORAL
Qty: 16 TABLET | Refills: 4 | Status: SHIPPED | OUTPATIENT
Start: 2022-05-11

## 2022-05-12 ENCOUNTER — OFFICE VISIT (OUTPATIENT)
Dept: INTERNAL MEDICINE CLINIC | Age: 32
End: 2022-05-12
Payer: COMMERCIAL

## 2022-05-12 VITALS
OXYGEN SATURATION: 97 % | DIASTOLIC BLOOD PRESSURE: 74 MMHG | HEART RATE: 78 BPM | SYSTOLIC BLOOD PRESSURE: 127 MMHG | BODY MASS INDEX: 24.48 KG/M2 | TEMPERATURE: 98.2 F | WEIGHT: 156 LBS | RESPIRATION RATE: 16 BRPM | HEIGHT: 67 IN

## 2022-05-12 DIAGNOSIS — E55.9 VITAMIN D DEFICIENCY: ICD-10-CM

## 2022-05-12 DIAGNOSIS — G43.919 INTRACTABLE MIGRAINE WITHOUT STATUS MIGRAINOSUS, UNSPECIFIED MIGRAINE TYPE: ICD-10-CM

## 2022-05-12 DIAGNOSIS — F41.1 GAD (GENERALIZED ANXIETY DISORDER): Primary | ICD-10-CM

## 2022-05-12 DIAGNOSIS — R53.83 FATIGUE, UNSPECIFIED TYPE: ICD-10-CM

## 2022-05-12 PROCEDURE — 99214 OFFICE O/P EST MOD 30 MIN: CPT | Performed by: INTERNAL MEDICINE

## 2022-05-12 RX ORDER — SERTRALINE HYDROCHLORIDE 25 MG/1
25 TABLET, FILM COATED ORAL DAILY
Qty: 90 TABLET | Refills: 0 | Status: SHIPPED | OUTPATIENT
Start: 2022-05-12 | End: 2022-06-03 | Stop reason: SDUPTHER

## 2022-05-12 RX ORDER — CLONAZEPAM 0.5 MG/1
TABLET ORAL
Qty: 8 TABLET | Refills: 0 | Status: SHIPPED | OUTPATIENT
Start: 2022-05-12 | End: 2022-06-03 | Stop reason: SDUPTHER

## 2022-05-12 NOTE — PROGRESS NOTES
Diagnoses and all orders for this visit:    1. GISELLA (generalized anxiety disorder)  Not optimally controlled, describes increased sensory perception sounds  Has been on Zoloft and Celexa in the past but preferred Zoloft  Underlying history of migraines  -     sertraline (ZOLOFT) 25 mg tablet; Take 1 Tablet by mouth daily. -     clonazePAM (KlonoPIN) 0.5 mg tablet; Take 1/2 to 1 tab daily ONLY as needed. Osbaldo noted Prozac and Paxil good for migraines and amitriptyline and venlafaxine also good. After discussion we will start on Zoloft as she has been on this before in the past and should not have any side effects    Discussed we may need to go to 50 mg if symptoms not optimally controlled at 25. She is aware. Follow-up in 6 weeks    2. Fatigue, unspecified type  Multifactorial  Check labs  Possibly related to untreated anxiety  -     CBC W/O DIFF; Future  -     METABOLIC PANEL, COMPREHENSIVE; Future    3. Vitamin D deficiency  Replete as needed  -     VITAMIN D, 25 HYDROXY; Future    4. Intractable migraine without status migrainosus, unspecified migraine type  Follow-up with Osbaldo  -     VITAMIN B12 & FOLATE; Future    Follow-up in 6 weeks      Chief Complaint   Patient presents with    Anxiety     Generalized anxiety   sensory processing much worse. She notes that she is more hyper aware and sensitive to sounds. She cannot eat in the same room with her significant other, Tristan due to his eating. mesophonia  Symptoms are self perceived and defintely worse  If gets engrossed in them can get panicky  She wories about day to day things  Difficulty relaxing    She has been exercising.  1-2  Times/week    Situational stress  Moved to another house 5 days ago  Tristan's mother has [address] [place] [address] Street [address]      Counselor  Working on CBT and DBT techniques    Past Medical History:   Diagnosis Date    Anxiety     prn clonazepam    Headache     controlled with magnesium and prn maxalt     History reviewed.  No pertinent surgical history. 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[place], clinicals through [employer]        No kids    Boyfriend, Ashli Carty X 5 years     Family History   Problem Relation Age of Onset    Diabetes Mother         prediabetic    Hypertension Mother     OSTEOARTHRITIS Father     Hypertension Father     Cancer Maternal Grandmother         lung    Breast Cancer Other     Heart Disease Neg Hx     Heart Attack Neg Hx      Current Outpatient Medications   Medication Sig Dispense Refill    rimegepant (Nurtec ODT) 75 mg disintegrating tablet Take 1 tablet by mouth every other day for migraine prevention 16 Tablet 4    rizatriptan (MAXALT-MLT) 10 mg disintegrating tablet 1 at HA onset and repeat in 2 hours if needed. Max 2 in 24 hours 12 Tablet 5    dextroamphetamine-amphetamine (ADDERALL) 7.5 mg tablet Take 1 Tablet by mouth five (5) days a week. Max Daily Amount: 7.5 mg. In the am. teva brand only please 60 Tablet 0    [START ON 7/15/2022] dextroamphetamine-amphetamine (ADDERALL) 7.5 mg tablet Take 1 po 5 days/weekIn the am. teva brand only please. 60 Tablet 0    dextroamphetamine-amphetamine (ADDERALL) 5 mg tablet Take 1 Tablet by mouth daily. Max Daily Amount: 5 mg.  teva brand only please  Indications: attention deficit disorder with hyperactivity 90 Tablet 0    calcium-cholecalciferol, d3, (CALCIUM 600 + D) 600-125 mg-unit tab Take 1,000 mg by mouth.  ascorbic acid, vitamin C, (VITAMIN C) 500 mg tablet Take  by mouth.  butalbital-acetaminophen-caffeine (FIORICET, ESGIC) -40 mg per tablet take1-2 tablets by mouth every 8 hours PRN for headache 40 Tablet 3    clonazePAM (KlonoPIN) 0.5 mg tablet Take 1/2 to 1 tab daily ONLY as needed. 8 Tablet 0    ketorolac (TORADOL) 10 mg tablet Take 1-2 tablets by mouth at HA onset. May repeat with 1 tablet every 6 hours PRN (Patient not taking: Reported on 5/12/2022) 20 Tablet 1    ondansetron (ZOFRAN ODT) 4 mg disintegrating tablet Take 4 mg by mouth every eight (8) hours as needed. (Patient not taking: Reported on 5/12/2022)      onabotulinumtoxinA (Botox) 200 unit injection Inject 155 units into 31 FDA indicated and approved sites in the head, face, neck every 3 months for chronic migraine (Patient not taking: Reported on 5/12/2022) 200 Units 5    rizatriptan (MAXALT-MLT) 10 mg disintegrating tablet Take 1 Tablet by mouth once as needed for Migraine for up to 1 dose. 9 Tablet 0    albuterol (PROVENTIL HFA, VENTOLIN HFA, PROAIR HFA) 90 mcg/actuation inhaler Take 1 Puff by inhalation every four (4) hours as needed for Wheezing. 1 Inhaler 6    zinc sulfate (ZINC-15 PO) Take  by mouth. (Patient not taking: Reported on 5/12/2022)      ibuprofen (MOTRIN) 600 mg tablet Take  by mouth every six (6) hours as needed for Pain.  magnesium gluconate 500 mg (27 mg  elemental) tablet Take 1 Tab by mouth daily.  90 Tab 1     Allergies   Allergen Reactions    Dilaudid [Hydromorphone] Hives    Soap Hives, Rash and Swelling     Allergic to some soaps       Review of Systems - General ROS: negative for - chills or fever  Cardiovascular ROS: no chest pain or dyspnea on exertion  Respiratory ROS: no cough, shortness of breath, or wheezing    Visit Vitals  /74 (BP 1 Location: Left upper arm, BP Patient Position: Sitting, BP Cuff Size: Adult)   Pulse 78   Temp 98.2 °F (36.8 °C) (Oral)   Resp 16   Ht 5' 7\" (1.702 m)   Wt 156 lb (70.8 kg)   LMP 04/28/2022 (Approximate)   SpO2 97%   BMI 24.43 kg/m²     Constitutional: [x] Appears well-developed and well-nourished [x] No apparent distress      [] Abnormal -     Mental status: [x] Alert and awake  [x] Oriented to person/place/time [x] Able to follow commands    [] Abnormal -     Eyes:   EOM    [x]  Normal    [] Abnormal -   Sclera  [x]  Normal    [] Abnormal -          Discharge [x]  None visible   [] Abnormal -     HENT: [x] Normocephalic, atraumatic  [] Abnormal -   [x] Mouth/Throat: Mucous membranes are moist    External Ears [x] Normal  [] Abnormal -    Neck: [x] No visualized mass [] Abnormal -     Pulmonary/Chest: [x] Respiratory effort normal   [x] No visualized signs of difficulty breathing or respiratory distress        [] Abnormal -      Musculoskeletal:   [x] Normal gait with no signs of ataxia         [x] Normal range of motion of neck        [] Abnormal -     Neurological:        [x] No Facial Asymmetry (Cranial nerve 7 motor function) (limited exam due to video visit)          [x] No gaze palsy        [] Abnormal -          Skin:        [x] No significant exanthematous lesions or discoloration noted on facial skin         [] Abnormal -            Psychiatric:       [x] Normal Affect [] Abnormal -        [x] No Hallucinations      This medical record was transcribed using an electronic medical records/speech recognition system. Although proofread, it may and can contain electronic, spelling and other errors. Corrections may be executed at a later time. Please contact us for any clarifications as needed. On this date 05/12/22  I have spent 30 minutes reviewing previous notes, test results and face to face with the patient discussing the diagnosis and importance of compliance with the treatment plan as well as documenting on the day of the visit.

## 2022-05-16 ENCOUNTER — TELEPHONE (OUTPATIENT)
Dept: NEUROLOGY | Age: 32
End: 2022-05-16

## 2022-05-16 NOTE — TELEPHONE ENCOUNTER
Called [employer] and they said claim processed with  No copay for pt, delivery set up for 05/24/22, rcvd BV back but only aleshia is showing on BV. [employer] said pt had a zero copay.   PA not needed for [id number]

## 2022-05-31 ENCOUNTER — TELEPHONE (OUTPATIENT)
Dept: NEUROLOGY | Age: 32
End: 2022-05-31

## 2022-05-31 DIAGNOSIS — G43.909 MIGRAINE WITHOUT STATUS MIGRAINOSUS, NOT INTRACTABLE, UNSPECIFIED MIGRAINE TYPE: Primary | ICD-10-CM

## 2022-05-31 RX ORDER — ERENUMAB-AOOE 140 MG/ML
140 INJECTION, SOLUTION SUBCUTANEOUS
Qty: 1 EACH | Refills: 6 | Status: SHIPPED | OUTPATIENT
Start: 2022-05-31 | End: 2022-06-02 | Stop reason: ALTCHOICE

## 2022-05-31 NOTE — TELEPHONE ENCOUNTER
[facility] via [address] [place] [address], created [place]'S BIBI Key# [id number], submitted and awaiting update.

## 2022-06-02 ENCOUNTER — TELEPHONE (OUTPATIENT)
Dept: NEUROLOGY | Age: 32
End: 2022-06-02

## 2022-06-02 RX ORDER — ATOGEPANT 60 MG/1
1 TABLET ORAL DAILY
Qty: 90 TABLET | Refills: 1 | Status: SHIPPED | OUTPATIENT
Start: 2022-06-02

## 2022-06-04 ENCOUNTER — PATIENT MESSAGE (OUTPATIENT)
Dept: INTERNAL MEDICINE CLINIC | Age: 32
End: 2022-06-04

## 2022-06-04 DIAGNOSIS — Q79.62 HYPERMOBILE EHLERS-DANLOS SYNDROME: Primary | ICD-10-CM

## 2022-06-06 ENCOUNTER — PATIENT MESSAGE (OUTPATIENT)
Dept: NEUROLOGY | Age: 32
End: 2022-06-06

## 2022-06-06 DIAGNOSIS — G43.819 OTHER MIGRAINE WITHOUT STATUS MIGRAINOSUS, INTRACTABLE: ICD-10-CM

## 2022-06-06 DIAGNOSIS — G43.909 MIGRAINE WITHOUT STATUS MIGRAINOSUS, NOT INTRACTABLE, UNSPECIFIED MIGRAINE TYPE: Primary | ICD-10-CM

## 2022-06-06 RX ORDER — RIZATRIPTAN BENZOATE 10 MG/1
TABLET, ORALLY DISINTEGRATING ORAL
Qty: 12 TABLET | Refills: 5 | Status: SHIPPED | OUTPATIENT
Start: 2022-06-06 | End: 2022-07-22 | Stop reason: SDUPTHER

## 2022-06-06 NOTE — TELEPHONE ENCOUNTER
From: Jaspal Hartmann  To: Bryant Hebert MD  Sent: 6/4/2022 5:43 PM EDT  Subject: Neurology/Rheum update    Hi Dr. Rajni Mora,    Im hoping you can refer me to an [facility] facility for neurology. Im going to have to switch practices. Specifically [facility], as she is close to where I live. 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 Luckily, all the autoimmune in my family was ruled out. He believes Primary Care and Neurology can manage my case & also recommended physical therapy (& gave me a script). My insurance company requires 30 day recertifications for PT. As Im not going to be followed by Rheum, Im wondering if you can manage PT referrals in the future? We can have an appointment to discuss if youd prefer.     Thanks,    [facility]

## 2022-06-07 ENCOUNTER — TELEPHONE (OUTPATIENT)
Dept: NEUROLOGY | Age: 32
End: 2022-06-07

## 2022-06-07 ENCOUNTER — PATIENT MESSAGE (OUTPATIENT)
Dept: NEUROLOGY | Age: 32
End: 2022-06-07

## 2022-06-07 NOTE — TELEPHONE ENCOUNTER
She's calling requesting the Prescription Enrollment Form for Quliptal Complete to be refaxed to [phone]    Some of the information they were unable to read.

## 2022-06-08 ENCOUNTER — TELEPHONE (OUTPATIENT)
Dept: NEUROLOGY | Age: 32
End: 2022-06-08

## 2022-06-08 NOTE — TELEPHONE ENCOUNTER
[facility] will provide med at no cost to patient for up to 24 months. Once she is covered by insurance, the program will stop coverage.     Please see letter

## 2022-06-14 RX ORDER — KETOROLAC TROMETHAMINE 10 MG/1
TABLET, FILM COATED ORAL
Qty: 30 TABLET | Refills: 4 | Status: SHIPPED | OUTPATIENT
Start: 2022-06-14

## 2022-07-03 DIAGNOSIS — F41.1 GAD (GENERALIZED ANXIETY DISORDER): ICD-10-CM

## 2022-07-06 RX ORDER — SERTRALINE HYDROCHLORIDE 50 MG/1
50 TABLET, FILM COATED ORAL DAILY
Qty: 90 TABLET | Refills: 0 | Status: SHIPPED | OUTPATIENT
Start: 2022-07-06 | End: 2022-08-03 | Stop reason: SDUPTHER

## 2022-07-06 NOTE — TELEPHONE ENCOUNTER
Re: [facility] LIZ [place]# [id number]    Scanned to chart. Per letter pt has not tried prevention meds for step therapy and med is not approved. Sent update to [facility] via weekly spreadsheet.

## 2022-07-22 DIAGNOSIS — G43.819 OTHER MIGRAINE WITHOUT STATUS MIGRAINOSUS, INTRACTABLE: ICD-10-CM

## 2022-07-25 DIAGNOSIS — G43.819 OTHER MIGRAINE WITHOUT STATUS MIGRAINOSUS, INTRACTABLE: ICD-10-CM

## 2022-07-25 RX ORDER — RIZATRIPTAN BENZOATE 10 MG/1
TABLET, ORALLY DISINTEGRATING ORAL
Qty: 12 TABLET | Refills: 5 | Status: SHIPPED | OUTPATIENT
Start: 2022-07-25

## 2022-07-25 RX ORDER — RIZATRIPTAN BENZOATE 10 MG/1
TABLET, ORALLY DISINTEGRATING ORAL
Qty: 12 TABLET | Refills: 5 | Status: SHIPPED | OUTPATIENT
Start: 2022-07-25 | End: 2022-07-25 | Stop reason: SDUPTHER

## 2022-08-03 DIAGNOSIS — F41.1 GAD (GENERALIZED ANXIETY DISORDER): ICD-10-CM

## 2022-08-03 DIAGNOSIS — F90.2 ATTENTION DEFICIT HYPERACTIVITY DISORDER (ADHD), COMBINED TYPE: ICD-10-CM

## 2022-08-03 RX ORDER — DEXTROAMPHETAMINE SACCHARATE, AMPHETAMINE ASPARTATE, DEXTROAMPHETAMINE SULFATE AND AMPHETAMINE SULFATE 1.875; 1.875; 1.875; 1.875 MG/1; MG/1; MG/1; MG/1
7.5 TABLET ORAL
Qty: 60 TABLET | Refills: 0 | Status: SHIPPED | OUTPATIENT
Start: 2022-08-03

## 2022-08-03 RX ORDER — SERTRALINE HYDROCHLORIDE 50 MG/1
50 TABLET, FILM COATED ORAL DAILY
Qty: 90 TABLET | Refills: 0 | Status: SHIPPED | OUTPATIENT
Start: 2022-08-03 | End: 2022-10-28 | Stop reason: SDUPTHER

## 2022-08-11 ENCOUNTER — PATIENT MESSAGE (OUTPATIENT)
Dept: NEUROLOGY | Age: 32
End: 2022-08-11

## 2022-08-15 DIAGNOSIS — G43.909 MIGRAINE WITHOUT STATUS MIGRAINOSUS, NOT INTRACTABLE, UNSPECIFIED MIGRAINE TYPE: Primary | ICD-10-CM

## 2022-08-18 DIAGNOSIS — F41.1 GAD (GENERALIZED ANXIETY DISORDER): ICD-10-CM

## 2022-08-20 RX ORDER — CLONAZEPAM 0.5 MG/1
TABLET ORAL
Qty: 8 TABLET | Refills: 0 | Status: SHIPPED | OUTPATIENT
Start: 2022-08-20 | End: 2022-09-15 | Stop reason: SDUPTHER

## 2022-09-06 ENCOUNTER — VIRTUAL VISIT (OUTPATIENT)
Dept: INTERNAL MEDICINE CLINIC | Age: 32
End: 2022-09-06
Payer: COMMERCIAL

## 2022-09-06 DIAGNOSIS — F41.9 ANXIETY: ICD-10-CM

## 2022-09-06 DIAGNOSIS — F90.2 ATTENTION DEFICIT HYPERACTIVITY DISORDER (ADHD), COMBINED TYPE: ICD-10-CM

## 2022-09-06 DIAGNOSIS — U07.1 COVID-19: Primary | ICD-10-CM

## 2022-09-06 PROCEDURE — 99214 OFFICE O/P EST MOD 30 MIN: CPT | Performed by: INTERNAL MEDICINE

## 2022-09-06 RX ORDER — OMEPRAZOLE 20 MG/1
40 CAPSULE, DELAYED RELEASE ORAL DAILY
Qty: 90 CAPSULE | Refills: 0 | Status: SHIPPED | OUTPATIENT
Start: 2022-09-06 | End: 2022-09-09 | Stop reason: SDUPTHER

## 2022-09-06 RX ORDER — DEXTROAMPHETAMINE SACCHARATE, AMPHETAMINE ASPARTATE, DEXTROAMPHETAMINE SULFATE AND AMPHETAMINE SULFATE 2.5; 2.5; 2.5; 2.5 MG/1; MG/1; MG/1; MG/1
10 TABLET ORAL DAILY
Qty: 90 TABLET | Refills: 0 | Status: SHIPPED | OUTPATIENT
Start: 2022-09-06 | End: 2022-09-15 | Stop reason: SDUPTHER

## 2022-09-06 NOTE — PROGRESS NOTES
Diagnoses and all orders for this visit:    1. COVID-19  Continue conservative care  Likely contracted from work/emergency room    2. Attention deficit hyperactivity disorder (ADHD), combined type  Not optimally controlled  Attempted trial of 10 mg with good results. 7.5 mg in the morning was not adequate    She will try Adderall 10 mg in the morning and 5 to 6 hours following this will take 7.5 mg in the late afternoon/evening. Note that patient mentions that she is losing weight but denies associated with the Adderall but possibly stress. She has stopped Adderall for 3 to 4 days and still has no appetite. Will need to monitor weight. If not able to tolerate may need to convert her to Vyvanse. She is aware. Her current weight is 143 pounds. -     dextroamphetamine-amphetamine (ADDERALL) 10 mg tablet; Take 1 Tablet by mouth daily. Max Daily Amount: 10 mg. teva brand only please  Indications: attention deficit disorder with hyperactivity    3. Anxiety  Continue sertraline 50 mg  Appears to be controlled  Reports situational stress and  from her boyfriend Tristan for now        Reflux  Patient reports feeling increased heartburn in her esophagus area. She was on a lot of Toradol with her migraines when working with [employer]. -     omeprazole (PRILOSEC) 20 mg capsule; Take 2 Capsules by mouth daily. Chief Complaint   Patient presents with    Medication Evaluation    Nausea    GERD     5 mos    Cough    Positive For Covid-19          Attention deficit  Patient reports that she was taking Adderall 5 mg in the morning and 7.5 mg in the evening about 5 hours after her morning dose. She notes that the Adderall was not really helping her symptoms and increase to 7.5 mg in the morning but no relief and then went to 10 mg. The 10 mg seems to improve her symptoms.     She has tried 10 in the am and 7.5 about 5 hours after the 10 am  Difficuty falling asleep but this is not new and more her baseline   weekly, walking, strenght neck  Was running and will get back to running      Reflux  Took toradol over last few months  She has esophageal pain and reflux type symptoms  She also has some nausea during the day  She feels well fast as well. Migraines  Sertraline 50 mg seems to be helping with anxiety but also migraines. She will need to transition to Dr. Pa Pugh because of insurance issues. COVID  She contracted COVID from the emergency room. Day 7  She initially had 103 fever    Anxiety  Her sertraline 50 mg appears to be at a good level. She is working with a counselor and since been seeing her for 1 and half years. Knows Tristan drinking which is the reason why she is  from him right now. Her anxiety symptoms are easier to control on the sertraline  Sleep messed up frrm prior rotating schedule            Past Medical History:   Diagnosis Date    Anxiety     prn clonazepam    Chong-Danlos syndrome 2022    Headache     controlled with magnesium and prn maxalt     History reviewed. No pertinent surgical history. 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        No kids    Boyfriend, Alfa Dowd X 9 years     Family History   Problem Relation Age of Onset    Diabetes Mother         prediabetic    Hypertension Mother     OSTEOARTHRITIS Father     Hypertension Father     Cancer Maternal Grandmother         lung    Breast Cancer Other     Heart Disease Neg Hx     Heart Attack Neg Hx      Current Outpatient Medications   Medication Sig Dispense Refill    clonazePAM (KlonoPIN) 0.5 mg tablet Take 1/2 to 1 tab daily ONLY as needed. 8 Tablet 0    dextroamphetamine-amphetamine (ADDERALL) 7.5 mg tablet Take 1 Tablet by mouth five (5) days a week. Max Daily Amount: 7.5 mg. In the am. teva brand only please 60 Tablet 0    sertraline (ZOLOFT) 50 mg tablet Take 1 Tablet by mouth in the morning. 90 Tablet 0    rizatriptan (MAXALT-MLT) 10 mg disintegrating tablet 1 at HA onset and repeat in 2 hours if needed. Max 2 in 24 hours 12 Tablet 5    ketorolac (TORADOL) 10 mg tablet Take 1-2 tablets by mouth at HA onset. May repeat with 1 tablet every 6 hours PRN up to 3 days a week 30 Tablet 4    dextroamphetamine-amphetamine (ADDERALL) 5 mg tablet Take 1 Tablet by mouth daily. Max Daily Amount: 5 mg. teva brand only please  Indications: attention deficit disorder with hyperactivity 90 Tablet 0    atogepant (Qulipta) 60 mg tab Take 1 Tablet by mouth daily. NEEDS CARD 90 Tablet 1    rimegepant (Nurtec ODT) 75 mg disintegrating tablet Take 1 tablet by mouth every other day for migraine prevention 16 Tablet 4    albuterol (PROVENTIL HFA, VENTOLIN HFA, PROAIR HFA) 90 mcg/actuation inhaler Take 1 Puff by inhalation every four (4) hours as needed for Wheezing. 1 Inhaler 6    calcium-cholecalciferol, d3, 600-125 mg-unit tab Take 1,000 mg by mouth. zinc sulfate (ZINC-15 PO) Take  by mouth. ascorbic acid, vitamin C, (VITAMIN C) 500 mg tablet Take  by mouth. ibuprofen (MOTRIN) 600 mg tablet Take  by mouth every six (6) hours as needed for Pain.      magnesium gluconate 500 mg (27 mg  elemental) tablet Take 1 Tab by mouth daily. 90 Tab 1    ondansetron (ZOFRAN ODT) 4 mg disintegrating tablet Take 4 mg by mouth every eight (8) hours as needed.  (Patient not taking: No sig reported)      rizatriptan (MAXALT-MLT) 10 mg disintegrating tablet Take 1 Tablet by mouth once as needed for Migraine for up to 1 dose. 9 Tablet 0     Allergies   Allergen Reactions    Dilaudid [Hydromorphone] Hives    Soap Hives, Rash and Swelling     Allergic to some soaps       Review of Systems - General ROS: negative for - chills or fever  Cardiovascular ROS: no chest pain or dyspnea on exertion  Respiratory ROS: no cough, shortness of breath, or wheezing    There were no vitals taken for this visit. Constitutional: [x] Appears well-developed and well-nourished [x] No apparent distress      [] Abnormal -     Mental status: [x] Alert and awake  [x] Oriented to person/place/time [x] Able to follow commands    [] Abnormal -     Eyes:   EOM    [x]  Normal    [] Abnormal -   Sclera  [x]  Normal    [] Abnormal -          Discharge [x]  None visible   [] Abnormal -     HENT: [x] Normocephalic, atraumatic  [] Abnormal -   [x] Mouth/Throat: Mucous membranes are moist    External Ears [x] Normal  [] Abnormal -    Neck: [x] No visualized mass [] Abnormal -     Pulmonary/Chest: [x] Respiratory effort normal   [x] No visualized signs of difficulty breathing or respiratory distress        [] Abnormal -      Musculoskeletal:   [x] Normal gait with no signs of ataxia         [x] Normal range of motion of neck        [] Abnormal -     Neurological:        [x] No Facial Asymmetry (Cranial nerve 7 motor function) (limited exam due to video visit)          [x] No gaze palsy        [] Abnormal -          Skin:        [x] No significant exanthematous lesions or discoloration noted on facial skin         [] Abnormal -            Psychiatric:       [x] Normal Affect [] Abnormal -        [x] No Hallucinations      This medical record was transcribed using an electronic medical records/speech recognition system. Although proofread, it may and can contain electronic, spelling and other errors. Corrections may be executed at a later time.   Please contact us for any clarifications as needed. This is a audio and visual video virtual visit. I was located in my office and the patient was located in his/her home. Pt has given consent and aware this visit will be billed to his/her health insurance.

## 2022-09-09 RX ORDER — OMEPRAZOLE 40 MG/1
40 CAPSULE, DELAYED RELEASE ORAL DAILY
Qty: 90 CAPSULE | Refills: 1 | Status: SHIPPED | OUTPATIENT
Start: 2022-09-09

## 2022-09-09 NOTE — TELEPHONE ENCOUNTER
Per pharmacy insurance will over cover 1 tab daily. Please send in a different quantity or dose. Or change medication.

## 2022-09-14 ENCOUNTER — PATIENT MESSAGE (OUTPATIENT)
Dept: INTERNAL MEDICINE CLINIC | Age: 32
End: 2022-09-14

## 2022-09-14 DIAGNOSIS — F90.2 ATTENTION DEFICIT HYPERACTIVITY DISORDER (ADHD), COMBINED TYPE: ICD-10-CM

## 2022-09-14 DIAGNOSIS — F41.1 GAD (GENERALIZED ANXIETY DISORDER): ICD-10-CM

## 2022-09-15 NOTE — TELEPHONE ENCOUNTER
From: Aman Méndez  To: Rajiv Luis MD  Sent: 9/14/2022 11:36 AM EDT  Subject: Refill    Hi Dr. Suraj Wetzel,    I wanted to ask if I can have a refill of my Klonapin? I am doing better with managing my anxiety, especially on Zoloft. But with my current relationship situation, I was hoping to have a refill for the really hard days. Long term relationship ending is not easy. Im currently moving out of the house and into a new place.      Thanks,    [facility]

## 2022-09-16 RX ORDER — DEXTROAMPHETAMINE SACCHARATE, AMPHETAMINE ASPARTATE, DEXTROAMPHETAMINE SULFATE AND AMPHETAMINE SULFATE 2.5; 2.5; 2.5; 2.5 MG/1; MG/1; MG/1; MG/1
10 TABLET ORAL DAILY
Qty: 90 TABLET | Refills: 0 | Status: SHIPPED | OUTPATIENT
Start: 2022-09-16

## 2022-09-16 RX ORDER — CLONAZEPAM 0.5 MG/1
TABLET ORAL
Qty: 8 TABLET | Refills: 0 | Status: SHIPPED | OUTPATIENT
Start: 2022-09-16 | End: 2022-10-28 | Stop reason: SDUPTHER

## 2022-10-28 DIAGNOSIS — F41.1 GAD (GENERALIZED ANXIETY DISORDER): ICD-10-CM

## 2022-10-28 RX ORDER — SERTRALINE HYDROCHLORIDE 50 MG/1
50 TABLET, FILM COATED ORAL DAILY
Qty: 30 TABLET | Refills: 0 | Status: SHIPPED | OUTPATIENT
Start: 2022-10-28 | End: 2022-11-04

## 2022-10-28 RX ORDER — CLONAZEPAM 0.5 MG/1
TABLET ORAL
Qty: 8 TABLET | Refills: 0 | Status: SHIPPED | OUTPATIENT
Start: 2022-10-28

## 2022-11-02 ENCOUNTER — OFFICE VISIT (OUTPATIENT)
Dept: INTERNAL MEDICINE CLINIC | Age: 32
End: 2022-11-02
Payer: COMMERCIAL

## 2022-11-02 VITALS
DIASTOLIC BLOOD PRESSURE: 85 MMHG | BODY MASS INDEX: 23.01 KG/M2 | TEMPERATURE: 98.2 F | WEIGHT: 146.6 LBS | SYSTOLIC BLOOD PRESSURE: 122 MMHG | OXYGEN SATURATION: 99 % | HEIGHT: 67 IN | RESPIRATION RATE: 16 BRPM | HEART RATE: 76 BPM

## 2022-11-02 DIAGNOSIS — Z11.3 SCREENING FOR STD (SEXUALLY TRANSMITTED DISEASE): Primary | ICD-10-CM

## 2022-11-02 PROCEDURE — 99213 OFFICE O/P EST LOW 20 MIN: CPT | Performed by: NURSE PRACTITIONER

## 2022-11-03 LAB
HIV 1+2 AB+HIV1 P24 AG SERPL QL IA: NONREACTIVE
HIV12 RESULT COMMENT, HHIVC: NORMAL

## 2022-11-03 NOTE — PROGRESS NOTES
[address] Sam[address] Lund (: 1990) is a 28 y.o. female, established patient, here for evaluation of the following chief complaint(s):  Exposure to STD (STD testing - asked if PAP can be done - last completed almost 5 years.)       ASSESSMENT/PLAN:  Below is the assessment and plan developed based on review of pertinent history, physical exam, labs, studies, and medications. 1. Screening for STD (sexually transmitted disease)-- discussed safe sex practices. -     T PALLIDUM SCREEN W/REFLEX; Future  -     HIV 1/2 AG/AB, 4TH GENERATION,W RFLX CONFIRM; Future  -     NUSWAB VAGINITIS PLUS; Future    Encouraged her to return to office with Dr Austen Lazaro for well woman exam with her next medication check. SUBJECTIVE/OBJECTIVE:  HPI    Patient of Dr Austen Lazaro presents to have screening for STDs. She is currently in a new relationship and her partner has already had screening for STDs. Denies any vaginal discharge, burning, pelvic pain. She is using condoms for contraception. She is overdue for well woman exam and pap smear and will return to have this done at her next visit. There is no problem list on file for this patient. History reviewed. No pertinent surgical history. Social History     Socioeconomic History    Marital status: SINGLE     Spouse name: Not on file    Number of children: Not on file    Years of education: Not on file    Highest education level: Not on file   Occupational History    Not on file   Tobacco Use    Smoking status: Never    Smokeless tobacco: Never   Vaping Use    Vaping Use: Never used   Substance and Sexual Activity    Alcohol use:  Yes     Alcohol/week: 3.0 standard drinks     Types: 1 Glasses of wine, 1 Cans of beer, 1 Shots of liquor per week     Comment: occasional weekend    Drug use: No    Sexual activity: Yes     Partners: Male     Birth control/protection: Condom     Comment: followed by [facility] [place] physicians   Other Topics Concern    Not on file   Social History Narrative    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[place], clinicals through [employer]        No kids    Boyfriend, Hercelso Loose X 9 years     Social Determinants of Health     Financial Resource Strain: Not on [employer] Insecurity: Not on file   Transportation Needs: Not on file   Physical Activity: Not on file   Stress: Not on file   Social Connections: Not on file   Intimate Partner Violence: Not on file   Housing Stability: Not on file     Family History   Problem Relation Age of Onset    Diabetes Mother         prediabetic    Hypertension Mother     OSTEOARTHRITIS Father     Hypertension Father     Cancer Maternal Grandmother         lung    Breast Cancer Other     Heart Disease Neg Hx     Heart Attack Neg Hx      Current Outpatient Medications   Medication Sig    sertraline (ZOLOFT) 50 mg tablet Take 1 Tablet by mouth daily. clonazePAM (KlonoPIN) 0.5 mg tablet Take 1/2 to 1 tab daily ONLY as needed. valACYclovir (VALTREX) 500 mg tablet Take 4 Tablets by mouth two (2) times a day. Total 4000 mg per outbreak. dextroamphetamine-amphetamine (ADDERALL) 10 mg tablet Take 1 Tablet by mouth daily. Max Daily Amount: 10 mg. teva brand only please  Indications: attention deficit disorder with hyperactivity    omeprazole (PRILOSEC) 40 mg capsule Take 1 Capsule by mouth daily. dextroamphetamine-amphetamine (ADDERALL) 7.5 mg tablet Take 1 Tablet by mouth five (5) days a week. Max Daily Amount: 7.5 mg. In the am. teva brand only please    rizatriptan (MAXALT-MLT) 10 mg disintegrating tablet 1 at HA onset and repeat in 2 hours if needed. Max 2 in 24 hours    ketorolac (TORADOL) 10 mg tablet Take 1-2 tablets by mouth at HA onset. May repeat with 1 tablet every 6 hours PRN up to 3 days a week    atogepant (Qulipta) 60 mg tab Take 1 Tablet by mouth daily.  NEEDS CARD    rimegepant (Nurtec ODT) 75 mg disintegrating tablet Take 1 tablet by mouth every other day for migraine prevention    ondansetron (ZOFRAN ODT) 4 mg disintegrating tablet Take 4 mg by mouth every eight (8) hours as needed. albuterol (PROVENTIL HFA, VENTOLIN HFA, PROAIR HFA) 90 mcg/actuation inhaler Take 1 Puff by inhalation every four (4) hours as needed for Wheezing. calcium-cholecalciferol, d3, 600-125 mg-unit tab Take 1,000 mg by mouth. zinc sulfate (ZINC-15 PO) Take  by mouth. ascorbic acid, vitamin C, (VITAMIN C) 500 mg tablet Take  by mouth. ibuprofen (MOTRIN) 600 mg tablet Take  by mouth every six (6) hours as needed for Pain.    magnesium gluconate 500 mg (27 mg  elemental) tablet Take 1 Tab by mouth daily. rizatriptan (MAXALT-MLT) 10 mg disintegrating tablet Take 1 Tablet by mouth once as needed for Migraine for up to 1 dose. No current facility-administered medications for this visit. Allergies   Allergen Reactions    Dilaudid [Hydromorphone] Hives    Soap Hives, Rash and Swelling     Allergic to some soaps     Immunization History   Administered Date(s) Administered    COVID-19, PFIZER PURPLE top, DILUTE for use, (age 15 y+), IM, 30mcg/0.3mL 12/21/2020, 01/11/2021, 11/29/2021    Influenza Vaccine 10/30/2017, 10/15/2018, 10/05/2020    Tdap 01/01/2014        Review of Systems   Constitutional:  Negative for chills and fever. Respiratory:  Negative for cough and shortness of breath. Cardiovascular:  Negative for chest pain and leg swelling. Gastrointestinal:  Negative for abdominal pain, nausea and vomiting. Genitourinary:  Negative for frequency, pelvic pain, vaginal discharge and vaginal pain. Neurological:  Negative for headaches. /85 (BP 1 Location: Left upper arm, BP Patient Position: Sitting, BP Cuff Size: Adult)   Pulse 76   Temp 98.2 °F (36.8 °C) (Oral)   Resp 16   Ht 5' 7\" (1.702 m)   Wt 146 lb 9.6 oz (66.5 kg)   SpO2 99%   BMI 22.96 kg/m²    Physical Exam  Vitals and nursing note reviewed. Exam conducted with a chaperone present.    Constitutional: General: She is not in acute distress. Appearance: Normal appearance. HENT:      Head: Normocephalic and atraumatic. Cardiovascular:      Rate and Rhythm: Normal rate and regular rhythm. Pulmonary:      Effort: Pulmonary effort is normal.      Breath sounds: Normal breath sounds. Abdominal:      General: Bowel sounds are normal.      Palpations: Abdomen is soft. Tenderness: There is no abdominal tenderness. Genitourinary:     General: Normal vulva. Exam position: Lithotomy position. Vagina: Normal.      Cervix: Normal.   Skin:     General: Skin is warm and dry. Neurological:      Mental Status: She is alert. An electronic signature was used to authenticate this note.   -- Talon Dahl NP

## 2022-11-04 DIAGNOSIS — F41.1 GAD (GENERALIZED ANXIETY DISORDER): ICD-10-CM

## 2022-11-04 LAB
A VAGINAE DNA VAG QL NAA+PROBE: NORMAL SCORE
BVAB2 DNA VAG QL NAA+PROBE: NORMAL SCORE
C ALBICANS DNA VAG QL NAA+PROBE: NEGATIVE
C GLABRATA DNA VAG QL NAA+PROBE: NEGATIVE
C TRACH RRNA SPEC QL NAA+PROBE: NEGATIVE
MEGA1 DNA VAG QL NAA+PROBE: NORMAL SCORE
N GONORRHOEA RRNA SPEC QL NAA+PROBE: NEGATIVE
SPECIMEN SOURCE: NORMAL
T PALLIDUM AB SER QL IA: NON REACTIVE
T VAGINALIS RRNA SPEC QL NAA+PROBE: NEGATIVE

## 2022-11-04 RX ORDER — SERTRALINE HYDROCHLORIDE 50 MG/1
TABLET, FILM COATED ORAL
Qty: 90 TABLET | Refills: 0 | Status: SHIPPED | OUTPATIENT
Start: 2022-11-04

## 2022-11-05 DIAGNOSIS — F90.2 ATTENTION DEFICIT HYPERACTIVITY DISORDER (ADHD), COMBINED TYPE: ICD-10-CM

## 2022-11-07 RX ORDER — DEXTROAMPHETAMINE SACCHARATE, AMPHETAMINE ASPARTATE, DEXTROAMPHETAMINE SULFATE AND AMPHETAMINE SULFATE 1.875; 1.875; 1.875; 1.875 MG/1; MG/1; MG/1; MG/1
7.5 TABLET ORAL
Qty: 60 TABLET | Refills: 0 | Status: SHIPPED | OUTPATIENT
Start: 2022-11-09

## 2022-12-09 DIAGNOSIS — F90.2 ATTENTION DEFICIT HYPERACTIVITY DISORDER (ADHD), COMBINED TYPE: ICD-10-CM

## 2022-12-09 DIAGNOSIS — F41.1 GAD (GENERALIZED ANXIETY DISORDER): ICD-10-CM

## 2022-12-09 RX ORDER — DEXTROAMPHETAMINE SACCHARATE, AMPHETAMINE ASPARTATE, DEXTROAMPHETAMINE SULFATE AND AMPHETAMINE SULFATE 2.5; 2.5; 2.5; 2.5 MG/1; MG/1; MG/1; MG/1
10 TABLET ORAL DAILY
Qty: 90 TABLET | Refills: 0 | Status: SHIPPED | OUTPATIENT
Start: 2022-12-09

## 2022-12-09 RX ORDER — CLONAZEPAM 0.5 MG/1
TABLET ORAL
Qty: 8 TABLET | Refills: 0 | Status: SHIPPED | OUTPATIENT
Start: 2022-12-09

## 2023-01-27 DIAGNOSIS — F90.2 ATTENTION DEFICIT HYPERACTIVITY DISORDER (ADHD), COMBINED TYPE: ICD-10-CM

## 2023-01-27 RX ORDER — DEXTROAMPHETAMINE SACCHARATE, AMPHETAMINE ASPARTATE, DEXTROAMPHETAMINE SULFATE AND AMPHETAMINE SULFATE 1.875; 1.875; 1.875; 1.875 MG/1; MG/1; MG/1; MG/1
7.5 TABLET ORAL
Qty: 60 TABLET | Refills: 0 | Status: SHIPPED | OUTPATIENT
Start: 2023-01-27

## 2023-02-28 DIAGNOSIS — F90.2 ATTENTION DEFICIT HYPERACTIVITY DISORDER (ADHD), COMBINED TYPE: ICD-10-CM

## 2023-02-28 DIAGNOSIS — F41.1 GAD (GENERALIZED ANXIETY DISORDER): ICD-10-CM

## 2023-02-28 RX ORDER — CLONAZEPAM 0.5 MG/1
TABLET ORAL
Qty: 8 TABLET | Refills: 0 | Status: SHIPPED | OUTPATIENT
Start: 2023-02-28

## 2023-02-28 RX ORDER — SERTRALINE HYDROCHLORIDE 50 MG/1
50 TABLET, FILM COATED ORAL DAILY
Qty: 90 TABLET | Refills: 0 | Status: SHIPPED | OUTPATIENT
Start: 2023-02-28 | End: 2023-03-01

## 2023-02-28 RX ORDER — VALACYCLOVIR HYDROCHLORIDE 500 MG/1
2000 TABLET, FILM COATED ORAL 2 TIMES DAILY
Qty: 16 TABLET | Refills: 1 | Status: SHIPPED | OUTPATIENT
Start: 2023-02-28

## 2023-02-28 RX ORDER — DEXTROAMPHETAMINE SACCHARATE, AMPHETAMINE ASPARTATE, DEXTROAMPHETAMINE SULFATE AND AMPHETAMINE SULFATE 2.5; 2.5; 2.5; 2.5 MG/1; MG/1; MG/1; MG/1
10 TABLET ORAL DAILY
Qty: 90 TABLET | Refills: 0 | Status: SHIPPED | OUTPATIENT
Start: 2023-02-28

## 2023-03-01 DIAGNOSIS — F41.1 GAD (GENERALIZED ANXIETY DISORDER): ICD-10-CM

## 2023-03-01 RX ORDER — SERTRALINE HYDROCHLORIDE 50 MG/1
TABLET, FILM COATED ORAL
Qty: 90 TABLET | Refills: 0 | Status: SHIPPED | OUTPATIENT
Start: 2023-03-01

## 2023-03-06 RX ORDER — ONDANSETRON 4 MG/1
TABLET, ORALLY DISINTEGRATING ORAL
Qty: 30 TABLET | Refills: 4 | Status: SHIPPED | OUTPATIENT
Start: 2023-03-06

## 2023-03-20 RX ORDER — ATOGEPANT 60 MG/1
1 TABLET ORAL DAILY
Qty: 90 TABLET | Refills: 1 | Status: SHIPPED | OUTPATIENT
Start: 2023-03-20

## 2023-04-03 ENCOUNTER — PATIENT MESSAGE (OUTPATIENT)
Dept: NEUROLOGY | Age: 33
End: 2023-04-03

## 2023-04-04 RX ORDER — RIZATRIPTAN BENZOATE 10 MG/1
TABLET, ORALLY DISINTEGRATING ORAL
Qty: 12 TABLET | Refills: 5 | Status: SHIPPED
Start: 2023-04-04

## 2023-04-24 ENCOUNTER — OFFICE VISIT (OUTPATIENT)
Dept: NEUROLOGY | Age: 33
End: 2023-04-24
Payer: COMMERCIAL

## 2023-04-24 VITALS
WEIGHT: 142 LBS | SYSTOLIC BLOOD PRESSURE: 126 MMHG | OXYGEN SATURATION: 99 % | HEART RATE: 91 BPM | BODY MASS INDEX: 22.24 KG/M2 | DIASTOLIC BLOOD PRESSURE: 84 MMHG

## 2023-04-24 DIAGNOSIS — G43.909 MIGRAINE WITHOUT STATUS MIGRAINOSUS, NOT INTRACTABLE, UNSPECIFIED MIGRAINE TYPE: Primary | ICD-10-CM

## 2023-04-24 DIAGNOSIS — G43.819 OTHER MIGRAINE WITHOUT STATUS MIGRAINOSUS, INTRACTABLE: ICD-10-CM

## 2023-04-24 PROCEDURE — 99214 OFFICE O/P EST MOD 30 MIN: CPT | Performed by: NURSE PRACTITIONER

## 2023-04-24 RX ORDER — RIZATRIPTAN BENZOATE 10 MG/1
TABLET, ORALLY DISINTEGRATING ORAL
Qty: 12 TABLET | Refills: 5 | Status: SHIPPED | OUTPATIENT
Start: 2023-04-24

## 2023-04-24 RX ORDER — ONDANSETRON 4 MG/1
TABLET, ORALLY DISINTEGRATING ORAL
Qty: 30 TABLET | Refills: 4 | Status: SHIPPED | OUTPATIENT
Start: 2023-04-24

## 2023-04-24 RX ORDER — MELOXICAM 15 MG/1
15 TABLET ORAL DAILY
Qty: 90 TABLET | Refills: 1 | Status: SHIPPED | OUTPATIENT
Start: 2023-04-24

## 2023-04-24 RX ORDER — MELOXICAM 15 MG/1
15 TABLET ORAL AS NEEDED
COMMUNITY
End: 2023-04-24 | Stop reason: ALTCHOICE

## 2023-04-25 NOTE — PROGRESS NOTES
Alda Cantu is a 35 y.o. female who presents with the following  Chief Complaint   Patient presents with    Follow-up       HPI    Transitioning back into care for migraines  She is doing well on Alanda Riser for prevention  She is down from 12 a month to about 4    Unilateral   Stabbing pains  She has sensitivity to light and sound  She is not sure of triggers  Maxalt MLT PRN   Works well   Zofran PRN -     Her GI stuff is better now   Had some type of infection   IV Rifampin doing better  Still working   Going to [employer] soon     Failed multiple treatments, including BOTOX       Allergies   Allergen Reactions    Dilaudid [Hydromorphone] Hives    Soap Hives, Rash and Swelling     Allergic to some soaps       Current Outpatient Medications   Medication Sig    meloxicam (MOBIC) 15 mg tablet Take 1 Tablet by mouth daily. rizatriptan (MAXALT-MLT) 10 mg disintegrating tablet 1 at HA onset and repeat in 2 hours if needed. Max 2 in 24 hours    ondansetron (ZOFRAN ODT) 4 mg disintegrating tablet 1-2 tablets by mouth every 8 hours PRN    atogepant (Qulipta) 60 mg tab Take 1 Tablet by mouth daily. NEEDS CARD    sertraline (ZOLOFT) 50 mg tablet TAKE 1 TABLET BY MOUTH EVERY DAY IN THE MORNING    dextroamphetamine-amphetamine (ADDERALL) 10 mg tablet Take 1 Tablet by mouth daily. Max Daily Amount: 10 mg. teva brand only please  Indications: attention deficit disorder with hyperactivity    clonazePAM (KlonoPIN) 0.5 mg tablet Take 1/2 to 1 tab daily ONLY as needed. omeprazole (PRILOSEC) 40 mg capsule Take 1 Capsule by mouth daily. albuterol (PROVENTIL HFA, VENTOLIN HFA, PROAIR HFA) 90 mcg/actuation inhaler Take 1 Puff by inhalation every four (4) hours as needed for Wheezing. calcium-cholecalciferol, d3, 600-125 mg-unit tab Take 1,000 mg by mouth. ascorbic acid, vitamin C, (VITAMIN C) 500 mg tablet Take  by mouth. ibuprofen (MOTRIN) 600 mg tablet Take  by mouth every six (6) hours as needed for Pain. dextroamphetamine-amphetamine (AdderalL) 5 mg tablet Take 2 Tablets by mouth two (2) times a day. Max Daily Amount: 20 mg. Take 2 (10 mg) in the am and 2 (10 mg) in the afternoon. (Patient not taking: Reported on 4/24/2023)    dextroamphetamine-amphetamine (ADDERALL) 7.5 mg tablet Take 1 Tablet by mouth five (5) days a week. Max Daily Amount: 7.5 mg. In the am. teva brand only please (Patient not taking: Reported on 4/24/2023)    rizatriptan (MAXALT-MLT) 10 mg disintegrating tablet Take 1 Tablet by mouth once as needed for Migraine for up to 1 dose. No current facility-administered medications for this visit. Social History     Tobacco Use   Smoking Status Never   Smokeless Tobacco Never       Past Medical History:   Diagnosis Date    Anxiety     prn clonazepam    Chong-Danlos syndrome 2022    Headache     controlled with magnesium and prn maxalt       No past surgical history on file. Family History   Problem Relation Age of Onset    Diabetes Mother         prediabetic    Hypertension Mother     OSTEOARTHRITIS Father     Hypertension Father     Cancer Maternal Grandmother         lung    Breast Cancer Other     Heart Disease Neg Hx     Heart Attack Neg Hx        Social History     Socioeconomic History    Marital status: SINGLE   Tobacco Use    Smoking status: Never    Smokeless tobacco: Never   Vaping Use    Vaping Use: Never used   Substance and Sexual Activity    Alcohol use:  Yes     Alcohol/week: 3.0 standard drinks     Types: 1 Glasses of wine, 1 Cans of beer, 1 Shots of liquor per week     Comment: occasional weekend    Drug use: No    Sexual activity: Yes     Partners: Male     Birth control/protection: Condom     Comment: followed by Kay Blake, [address] [facility] physicians   Social History Narrative    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 Negative for double vision. Cardiovascular:  Negative for chest pain and palpitations. Gastrointestinal:  Negative for nausea and vomiting. Neurological:  Positive for dizziness and headaches. Negative for seizures, loss of consciousness and weakness. Remainder of comprehensive review is negative. Physical Exam :    Visit Vitals  /84 (BP 1 Location: Left upper arm, BP Patient Position: Sitting, BP Cuff Size: Adult)   Pulse 91   Wt 64.4 kg (142 lb)   SpO2 99%   BMI 22.24 kg/m²       General: Well defined, nourished, and groomed individual in no acute distress. Musculoskeletal: Extremities revealed no edema and had full range of motion of joints. Psych: Good mood and bright affect    NEUROLOGICAL EXAMINATION:    Mental Status: Alert and oriented to person, place, and time    Cranial Nerves:    II, III, IV, VI: Visual acuity grossly intact. Visual fields are normal.    Pupils are equal, round, and reactive to light and accommodation. Extra-ocular movements are full and fluid. Fundoscopic exam was benign, no ptosis or nystagmus. V-XII: Hearing is grossly intact. Facial features are symmetric, with normal sensation and strength. The palate rises symmetrically and the tongue protrudes midline. Sternocleidomastoids 5/5. Motor Examination: Normal tone, bulk, and strength, 5/5 muscle strength throughout. Coordination: Finger to nose was normal. No resting or intention tremor    Gait and Station: Steady while walking. Normal arm swing. No pronator drift. No muscle wasting or fasiculations noted. Reflexes: DTRs 2+ throughout.           Results for orders placed or performed in visit on 11/02/22   NUAB VAGINITIS PLUS   Result Value Ref Range    Source VAGINAL     Atopobium vaginae SEE COMMENT Score    BVAB 2 SEE COMMENT Score    Megasphaera 1 SEE COMMENT Score    C. albicans, DEVYN Negative Negative      C. glabrata, DEVYN Negative Negative      T. vaginalis, DEVYN Negative Negative      C. trachomatis, DEVYN Negative Negative      N. gonorrhoeae, DEVYN Negative Negative           Orders Placed This Encounter    DISCONTD: meloxicam (MOBIC) 15 mg tablet     Sig: Take 1 Tablet by mouth as needed for Pain.    meloxicam (MOBIC) 15 mg tablet     Sig: Take 1 Tablet by mouth daily. Dispense:  90 Tablet     Refill:  1    rizatriptan (MAXALT-MLT) 10 mg disintegrating tablet     Si at HA onset and repeat in 2 hours if needed. Max 2 in 24 hours     Dispense:  12 Tablet     Refill:  5     odt pelase    ondansetron (ZOFRAN ODT) 4 mg disintegrating tablet     Si-2 tablets by mouth every 8 hours PRN     Dispense:  30 Tablet     Refill:  4       1. Migraine without status migrainosus, not intractable, unspecified migraine type    2.  Other migraine without status migrainosus, intractable          She is managing well on Qulipta    Continue this for prevention of migraines  She has Maxalt as needed  She has Zofran as needed  We will refill all of these and the meloxicam        Keep track of symptoms and call with changes          This note will not be viewable in MyChart

## 2023-05-12 ENCOUNTER — PATIENT MESSAGE (OUTPATIENT)
Age: 33
End: 2023-05-12

## 2023-05-12 DIAGNOSIS — F90.2 ATTENTION-DEFICIT HYPERACTIVITY DISORDER, COMBINED TYPE: Primary | ICD-10-CM

## 2023-05-12 RX ORDER — DEXTROAMPHETAMINE SACCHARATE, AMPHETAMINE ASPARTATE, DEXTROAMPHETAMINE SULFATE AND AMPHETAMINE SULFATE 2.5; 2.5; 2.5; 2.5 MG/1; MG/1; MG/1; MG/1
10 TABLET ORAL 2 TIMES DAILY
Qty: 60 TABLET | Refills: 0 | Status: SHIPPED | OUTPATIENT
Start: 2023-05-12 | End: 2023-06-11

## 2023-05-12 RX ORDER — RIZATRIPTAN BENZOATE 10 MG/1
TABLET, ORALLY DISINTEGRATING ORAL
COMMUNITY
Start: 2023-04-27 | End: 2023-06-12 | Stop reason: SDUPTHER

## 2023-05-12 RX ORDER — ATOGEPANT 60 MG/1
TABLET ORAL
COMMUNITY
Start: 2023-05-11

## 2023-05-12 RX ORDER — CLONAZEPAM 0.5 MG/1
TABLET ORAL
COMMUNITY
Start: 2023-03-01 | End: 2023-05-31 | Stop reason: SDUPTHER

## 2023-05-12 RX ORDER — DEXTROAMPHETAMINE SACCHARATE, AMPHETAMINE ASPARTATE, DEXTROAMPHETAMINE SULFATE AND AMPHETAMINE SULFATE 1.25; 1.25; 1.25; 1.25 MG/1; MG/1; MG/1; MG/1
TABLET ORAL
COMMUNITY
Start: 2023-05-10 | End: 2023-05-12

## 2023-05-12 RX ORDER — DEXTROAMPHETAMINE SACCHARATE, AMPHETAMINE ASPARTATE, DEXTROAMPHETAMINE SULFATE AND AMPHETAMINE SULFATE 1.25; 1.25; 1.25; 1.25 MG/1; MG/1; MG/1; MG/1
10 TABLET ORAL 2 TIMES DAILY
Qty: 120 TABLET | Refills: 0 | Status: CANCELLED | OUTPATIENT
Start: 2023-05-12 | End: 2023-06-11

## 2023-05-12 NOTE — TELEPHONE ENCOUNTER
From: Mile De Souza  To: Dr. Shayy Bello  Sent: 5/12/2023 1:59 AM EDT  Subject: Adderall     Hi Dr. Daksha Balderas,    Can you please send a new script to the [facility][1] for Adderall 10mg BID? They only did a partial refill the last time you sent the 5mg x2 BID so I am almost out of medication. They do not have the 5mg in stock but have the 10mg in stock for a 90 day supply.      Thanks,    [facility]

## 2023-05-15 DIAGNOSIS — F90.2 ATTENTION-DEFICIT HYPERACTIVITY DISORDER, COMBINED TYPE: ICD-10-CM

## 2023-05-15 RX ORDER — DEXTROAMPHETAMINE SACCHARATE, AMPHETAMINE ASPARTATE, DEXTROAMPHETAMINE SULFATE AND AMPHETAMINE SULFATE 2.5; 2.5; 2.5; 2.5 MG/1; MG/1; MG/1; MG/1
10 TABLET ORAL 2 TIMES DAILY
Qty: 60 TABLET | Refills: 0 | OUTPATIENT
Start: 2023-05-15 | End: 2023-06-14

## 2023-05-21 RX ORDER — OMEPRAZOLE 40 MG/1
40 CAPSULE, DELAYED RELEASE ORAL DAILY
COMMUNITY
Start: 2022-09-09

## 2023-05-21 RX ORDER — MELOXICAM 15 MG/1
15 TABLET ORAL DAILY
COMMUNITY
Start: 2023-04-24

## 2023-05-21 RX ORDER — ONDANSETRON 4 MG/1
TABLET, ORALLY DISINTEGRATING ORAL
COMMUNITY
Start: 2023-04-24

## 2023-05-21 RX ORDER — ALBUTEROL SULFATE 90 UG/1
1 AEROSOL, METERED RESPIRATORY (INHALATION) EVERY 4 HOURS PRN
COMMUNITY
Start: 2020-12-04

## 2023-05-21 RX ORDER — IBUPROFEN 600 MG/1
TABLET ORAL EVERY 6 HOURS PRN
COMMUNITY
End: 2023-05-31

## 2023-05-21 RX ORDER — ASCORBIC ACID 500 MG
TABLET ORAL
COMMUNITY

## 2023-05-24 RX ORDER — DIHYDROERGOTAMINE MESYLATE 4 MG/ML
SPRAY, METERED NASAL
Qty: 5 ML | Refills: 5 | Status: SHIPPED | OUTPATIENT
Start: 2023-05-24

## 2023-05-28 SDOH — ECONOMIC STABILITY: HOUSING INSECURITY
IN THE LAST 12 MONTHS, WAS THERE A TIME WHEN YOU DID NOT HAVE A STEADY PLACE TO SLEEP OR SLEPT IN A SHELTER (INCLUDING NOW)?: NO

## 2023-05-28 SDOH — ECONOMIC STABILITY: TRANSPORTATION INSECURITY
IN THE PAST 12 MONTHS, HAS LACK OF TRANSPORTATION KEPT YOU FROM MEETINGS, WORK, OR FROM GETTING THINGS NEEDED FOR DAILY LIVING?: NO

## 2023-05-28 SDOH — ECONOMIC STABILITY: INCOME INSECURITY: HOW HARD IS IT FOR YOU TO PAY FOR THE VERY BASICS LIKE FOOD, HOUSING, MEDICAL CARE, AND HEATING?: SOMEWHAT HARD

## 2023-05-28 SDOH — ECONOMIC STABILITY: FOOD INSECURITY: WITHIN THE PAST 12 MONTHS, THE FOOD YOU BOUGHT JUST DIDN'T LAST AND YOU DIDN'T HAVE MONEY TO GET MORE.: NEVER TRUE

## 2023-05-28 SDOH — ECONOMIC STABILITY: FOOD INSECURITY: WITHIN THE PAST 12 MONTHS, YOU WORRIED THAT YOUR FOOD WOULD RUN OUT BEFORE YOU GOT MONEY TO BUY MORE.: NEVER TRUE

## 2023-05-31 ENCOUNTER — OFFICE VISIT (OUTPATIENT)
Age: 33
End: 2023-05-31
Payer: COMMERCIAL

## 2023-05-31 VITALS
BODY MASS INDEX: 22.03 KG/M2 | OXYGEN SATURATION: 100 % | DIASTOLIC BLOOD PRESSURE: 78 MMHG | SYSTOLIC BLOOD PRESSURE: 127 MMHG | HEIGHT: 67 IN | WEIGHT: 140.4 LBS | RESPIRATION RATE: 14 BRPM | HEART RATE: 105 BPM

## 2023-05-31 DIAGNOSIS — Z20.2 STD EXPOSURE: Primary | ICD-10-CM

## 2023-05-31 DIAGNOSIS — F90.2 ATTENTION-DEFICIT HYPERACTIVITY DISORDER, COMBINED TYPE: ICD-10-CM

## 2023-05-31 DIAGNOSIS — F41.9 ANXIETY: ICD-10-CM

## 2023-05-31 LAB
11-NOR-9-THC-9-COOH, POC: NEGATIVE
AMPHETAMINE, URINE, POC: NORMAL
BENZODIAZEPINES, URINE, POC: NORMAL
BENZOYLECGONINE, URINE, POC: NEGATIVE
METHADONE, URINE, POC: NORMAL
METHAMPHETAMINE, URINE, POC: NEGATIVE
MORPHINE, URINE, POC: NEGATIVE
PHENCYCLIDINE, URINE, POC: NEGATIVE
URINALYSIS COLOR, POC: YELLOW

## 2023-05-31 PROCEDURE — 80305 DRUG TEST PRSMV DIR OPT OBS: CPT | Performed by: INTERNAL MEDICINE

## 2023-05-31 PROCEDURE — 99214 OFFICE O/P EST MOD 30 MIN: CPT | Performed by: INTERNAL MEDICINE

## 2023-05-31 RX ORDER — DEXTROAMPHETAMINE SACCHARATE, AMPHETAMINE ASPARTATE, DEXTROAMPHETAMINE SULFATE AND AMPHETAMINE SULFATE 2.5; 2.5; 2.5; 2.5 MG/1; MG/1; MG/1; MG/1
10 TABLET ORAL 2 TIMES DAILY
Qty: 180 TABLET | Refills: 0 | Status: SHIPPED | OUTPATIENT
Start: 2023-05-31 | End: 2023-06-30

## 2023-05-31 RX ORDER — DEXTROAMPHETAMINE SACCHARATE, AMPHETAMINE ASPARTATE, DEXTROAMPHETAMINE SULFATE AND AMPHETAMINE SULFATE 1.25; 1.25; 1.25; 1.25 MG/1; MG/1; MG/1; MG/1
TABLET ORAL
Qty: 36 TABLET | Refills: 0 | Status: SHIPPED | OUTPATIENT
Start: 2023-05-31 | End: 2023-08-29

## 2023-05-31 RX ORDER — CLONAZEPAM 0.5 MG/1
TABLET ORAL
Qty: 10 TABLET | Refills: 0 | Status: SHIPPED | OUTPATIENT
Start: 2023-05-31 | End: 2023-09-06

## 2023-05-31 ASSESSMENT — PATIENT HEALTH QUESTIONNAIRE - PHQ9
SUM OF ALL RESPONSES TO PHQ9 QUESTIONS 1 & 2: 0
1. LITTLE INTEREST OR PLEASURE IN DOING THINGS: 0
2. FEELING DOWN, DEPRESSED OR HOPELESS: 0
SUM OF ALL RESPONSES TO PHQ QUESTIONS 1-9: 0

## 2023-05-31 NOTE — PROGRESS NOTES
ASSESSMENT & PLAN:  1. STD exposure  Was in the emergency room due to perihepatitis  After evaluation by GYN found to be positive for chlamydia but treated for gonorrhea  We will do work-up  Follow-up with GYN-     HIV 1/2 Ag/Ab, 4TH Generation,W Rflx Confirm; Future  -     C.trachomatis N.gonorrhoeae DNA, Urine ([facility] Only); Future  -     Hepatitis C Antibody; Future  -     T. pallidum Ab; Future  -     AMB POC DRUG SCREEN, URINE    2. Attention-deficit hyperactivity disorder, combined type  Stable  She is taking 10 mg twice daily 4 days a week  She is taking 10 mg twice a day and then another 5 mg 3 days of the week  -     amphetamine-dextroamphetamine (ADDERALL, 10MG,) 10 MG tablet; Take 1 tablet by mouth 2 times daily for 30 days. Max Daily Amount: 20 mg, Disp-180 tablet, R-0Normal  -     amphetamine-dextroamphetamine (ADDERALL, 5MG,) 5 MG tablet; Take 1 tablet at 5 pm three days of the week, Disp-36 tablet, R-0Normal    3. Anxiety  Stable  Using clonazepam very sparingly  Has not been using clonazepam since breaking up with [facility]  -     sertraline (ZOLOFT) 50 MG tablet;  Take 1 tablet by mouth every morning, Disp-90 tablet, R-3Normal  -     clonazePAM (KLONOPIN) 0.5 MG tablet; TAKE 1/2 TO 1 TABLET BY MOUTH DAILY AS NEEDED, Disp-10 tablet, R-0Normal        Chief Complaint   Patient presents with    Follow-up     STD      Recently in the ER due to right upper quadrant pain  Followed up with GYN and found to be positive for chlamydia  3/20 bloody stools started off and on  4/7 stool cx and hemmouclt neg  4/18 RUQ Pleuritc pain  back from [facility]  4/19 ER FEVER , RUQpain /pleuritc pain, CTA chest neg for  PE  CT abd Plevis abd/plevisbladder wall thichining,   Cystiist righ     3 days 1000 mg Rocephin     Dr. Kathie Florentino, , gonnorrhea and chlamdia  Cylamdia  Doxy BID  Will follow up with Gyn Dr. Laurent Zazueta urinary or abdominal       Attention deficit  10 mg bid  for 4 days a week  10 mg bid and 5 mg evening 3 days of

## 2023-07-26 ENCOUNTER — PATIENT MESSAGE (OUTPATIENT)
Age: 33
End: 2023-07-26

## 2023-07-27 DIAGNOSIS — M79.18 MYOFASCIAL PAIN: ICD-10-CM

## 2023-07-27 DIAGNOSIS — M54.2 CERVICALGIA: Primary | ICD-10-CM

## 2023-07-27 RX ORDER — RIZATRIPTAN BENZOATE 10 MG/1
TABLET, ORALLY DISINTEGRATING ORAL
Qty: 12 TABLET | Refills: 2 | Status: SHIPPED | OUTPATIENT
Start: 2023-07-27

## 2023-07-27 RX ORDER — ONDANSETRON 4 MG/1
TABLET, ORALLY DISINTEGRATING ORAL
Qty: 30 TABLET | Refills: 4 | Status: SHIPPED | OUTPATIENT
Start: 2023-07-27

## 2023-08-03 ENCOUNTER — PROCEDURE VISIT (OUTPATIENT)
Age: 33
End: 2023-08-03
Payer: COMMERCIAL

## 2023-08-03 DIAGNOSIS — G43.909 MIGRAINE WITHOUT STATUS MIGRAINOSUS, NOT INTRACTABLE, UNSPECIFIED MIGRAINE TYPE: Primary | ICD-10-CM

## 2023-08-03 DIAGNOSIS — M79.18 MYOFASCIAL PAIN: ICD-10-CM

## 2023-08-03 PROCEDURE — 20552 NJX 1/MLT TRIGGER POINT 1/2: CPT | Performed by: NURSE PRACTITIONER

## 2023-08-03 RX ORDER — BUPIVACAINE HYDROCHLORIDE 5 MG/ML
30 INJECTION, SOLUTION EPIDURAL; INTRACAUDAL ONCE
Status: COMPLETED | OUTPATIENT
Start: 2023-08-03 | End: 2023-08-03

## 2023-08-03 RX ORDER — METHYLPREDNISOLONE ACETATE 40 MG/ML
40 INJECTION, SUSPENSION INTRA-ARTICULAR; INTRALESIONAL; INTRAMUSCULAR; SOFT TISSUE ONCE
Status: COMPLETED | OUTPATIENT
Start: 2023-08-03 | End: 2023-08-03

## 2023-08-03 RX ORDER — BACLOFEN 10 MG/1
TABLET ORAL
Qty: 60 TABLET | Refills: 4 | Status: SHIPPED | OUTPATIENT
Start: 2023-08-03

## 2023-08-03 RX ADMIN — METHYLPREDNISOLONE ACETATE 40 MG: 40 INJECTION, SUSPENSION INTRA-ARTICULAR; INTRALESIONAL; INTRAMUSCULAR; SOFT TISSUE at 12:42

## 2023-08-03 RX ADMIN — BUPIVACAINE HYDROCHLORIDE 50 MG: 5 INJECTION, SOLUTION EPIDURAL; INTRACAUDAL at 12:41

## 2023-08-03 NOTE — PROGRESS NOTES
[address] [address]             OFFICE PROCEDURE NOTE    Patient Name: Kei Hubbard  YOB: 1990  MRN: [id number]    PROCEDURE: Trigger Point Injection  Date of Procedure: 8/3/2023  CPT Code:  16365    ICD-10 Code:     ICD-10-CM    1. Migraine without status migrainosus, not intractable, unspecified migraine type  G43.909       2. Myofascial pain  M79.18 bupivacaine (PF) (MARCAINE) 0.5 % injection 150 mg     methylPREDNISolone acetate (DEPO-MEDROL) injection 40 mg     INJECT TRIGGER POINT, 1 OR 2          Time Out performed immediately prior to the start of procedure:  Yves BOYCE APRN - NP, have performed the following review on Kei Hubbard prior to the start of the procedure: Trigger Point Injection. The patient was identified by name and date of birth. Agreement on the procedure to be performed was verified. The potential Benefits and potential Risks were explained to the patient. The procedure site(s) were verified and marked as necessary. Consent was signed and verified. The patient was positioned for comfort. Time: 1140. Date of Procedure: 8/3/2023. Procedure performed by: MELO Bains NP. Provider assisted by: none. Patient assisted by: self. How patient tolerated procedure: mild procedure-related pain, no complications. Comments: none. Medications/ Supplies:     10 mL preservative-free 1% Bupivicaine   1 mL Depo-Medrol 40mg/ mL   27G x 1 and 1/4\" sterile needle     TECHNICAL:    The procedure and potential complications were explained to the patient, and verbal consent was obtained. 11 areas of myofascial tenderness were identified in the trapezius and marked with a ballpoint pen. The above medication was drawn up in a sterile fashion.   The prior marked sites were cleaned with alcohol swabs and 1 mL solution was injected at each site without

## 2023-08-10 DIAGNOSIS — F90.2 ATTENTION-DEFICIT HYPERACTIVITY DISORDER, COMBINED TYPE: ICD-10-CM

## 2023-08-10 RX ORDER — DEXTROAMPHETAMINE SACCHARATE, AMPHETAMINE ASPARTATE, DEXTROAMPHETAMINE SULFATE AND AMPHETAMINE SULFATE 2.5; 2.5; 2.5; 2.5 MG/1; MG/1; MG/1; MG/1
10 TABLET ORAL 2 TIMES DAILY
Qty: 180 TABLET | Refills: 0 | Status: SHIPPED | OUTPATIENT
Start: 2023-08-10 | End: 2023-11-08

## 2023-08-21 DIAGNOSIS — F41.9 ANXIETY: ICD-10-CM

## 2023-08-21 RX ORDER — CLONAZEPAM 0.5 MG/1
TABLET ORAL
Qty: 10 TABLET | Refills: 0 | Status: SHIPPED | OUTPATIENT
Start: 2023-08-21 | End: 2023-11-27

## 2023-08-28 ENCOUNTER — TELEPHONE (OUTPATIENT)
Age: 33
End: 2023-08-28

## 2023-09-14 DIAGNOSIS — G43.909 MIGRAINE WITHOUT STATUS MIGRAINOSUS, NOT INTRACTABLE, UNSPECIFIED MIGRAINE TYPE: Primary | ICD-10-CM

## 2023-09-14 RX ORDER — FREMANEZUMAB-VFRM 225 MG/1.5ML
225 INJECTION SUBCUTANEOUS
Qty: 1.5 ML | Refills: 5 | Status: SHIPPED | OUTPATIENT
Start: 2023-09-14

## 2023-09-25 DIAGNOSIS — F41.9 ANXIETY: ICD-10-CM

## 2023-10-16 DIAGNOSIS — F41.9 ANXIETY: ICD-10-CM

## 2023-10-16 RX ORDER — CLONAZEPAM 0.5 MG/1
TABLET ORAL
Qty: 10 TABLET | Refills: 0 | Status: SHIPPED | OUTPATIENT
Start: 2023-10-16 | End: 2024-01-22

## 2023-10-18 ENCOUNTER — TELEPHONE (OUTPATIENT)
Age: 33
End: 2023-10-18

## 2023-10-18 NOTE — TELEPHONE ENCOUNTER
Was going to start PA for ajovy however, when pulling up CMM found one was already started and denied through insurance. Reviewed questions and looks like some were answered incorrectly and no clinical notes were submitted with. No documentation in chart on the PA. CMM gave the option to submit appeal, did so with clinical notes.    Gwendolyn PA submitted via [facility]   Alfredito LUCIO Case ID: [id number] - Rx #: [id number]   DENIED  **SUBMITTED E-APPEAL**  AWAITING THIS OUTCOME

## 2023-10-20 NOTE — TELEPHONE ENCOUNTER
E-APPEAL bruna  Why does my plan want me to get a prior authorization? A Safety: You may be taking the medication for a different condition than it was designed for. A Effectiveness: Your medication may not work as intended with other medications you take. A Cost control: Your plan may cover a lower-cost option that works the same way. SUMMARY OF PRIOR AUTHORIZATION DECISION  DECISION: Approved  DRUG [facility] 225/1.5  NAME:  Merlin Seller PATIENT Member ID: [id number]  NAME: Case number: [id number]  NEXT You can now fill your prescription for this medication.   STEPS:  VALID 10/13/2023 - 04/18/2024

## 2023-11-03 ENCOUNTER — OFFICE VISIT (OUTPATIENT)
Age: 33
End: 2023-11-03

## 2023-11-03 VITALS — SYSTOLIC BLOOD PRESSURE: 124 MMHG | DIASTOLIC BLOOD PRESSURE: 78 MMHG | OXYGEN SATURATION: 99 % | HEART RATE: 101 BPM

## 2023-11-03 DIAGNOSIS — G43.909 MIGRAINE WITHOUT STATUS MIGRAINOSUS, NOT INTRACTABLE, UNSPECIFIED MIGRAINE TYPE: Primary | ICD-10-CM

## 2023-11-03 NOTE — PROGRESS NOTES
Ibis Jaquez is a 35 y.o. female who presents with the following  Chief Complaint   Patient presents with    Follow-up     Migraines        HPI       Transitioning back into care for migraines  Switched from [facility] to [facility]. Has done 4 shots. Doing a lot better   3-4 migraines a month   Down from 10 a month       Unilateral    Stabbing pains   She has sensitivity to light and sound   She is not sure of triggers   Maxalt MLT PRN    Works well    Zofran PRN -      Allergies   Allergen Reactions    Hydromorphone Hives    Soap Hives, Rash and Swelling     Allergic to some soaps       Current Outpatient Medications   Medication Sig Dispense Refill    amphetamine-dextroamphetamine (ADDERALL, 10MG,) 10 MG tablet Take 1 tablet by mouth 2 times daily AND 0.5 tablets four times a week. Do all this for 90 days. Max Daily Amount: 25 mg. 228 tablet 0    clonazePAM (KLONOPIN) 0.5 MG tablet TAKE 1/2 TO 1 TABLET BY MOUTH DAILY AS NEEDED 10 tablet 0    sertraline (ZOLOFT) 50 MG tablet Take 1 tablet by mouth every morning 90 tablet 3    Fremanezumab-vfrm (AJOVY) 225 MG/1.5ML SOAJ Inject 225 mg into the skin every 30 days 1.5 mL 5    baclofen (LIORESAL) 10 MG tablet 1 tablet by mouth BID PRN 60 tablet 4    rizatriptan (MAXALT-MLT) 10 MG disintegrating tablet DISSOLVE 1 TABLET BY MOUTH AT ONSET OF HEADACHE AND REPEAT IN 2 HOURS IF NEEDED MAX OF 2 TABLETS PER 24 HOURS 12 tablet 2    ondansetron (ZOFRAN-ODT) 4 MG disintegrating tablet 1-2 tablets by mouth every 8 hours PRN 30 tablet 4    albuterol sulfate HFA (PROVENTIL;VENTOLIN;PROAIR) 108 (90 Base) MCG/ACT inhaler Inhale 1 puff into the lungs every 4 hours as needed      ascorbic acid (VITAMIN C) 500 MG tablet Take by mouth      Calcium Carbonate-Vitamin D 600-3. 125 MG-MCG TABS Take 1,000 mg by mouth      meloxicam (MOBIC) 15 MG tablet Take 1 tablet by mouth daily       No current facility-administered medications for this visit.         Social History     Tobacco Use

## 2023-11-03 NOTE — PROGRESS NOTES
MHA has been doing well, monthly has about 3-5   Triptan works some of the time  Handles meds well  Has FMLA forms recert

## 2023-12-07 ENCOUNTER — PROCEDURE VISIT (OUTPATIENT)
Age: 33
End: 2023-12-07

## 2023-12-07 DIAGNOSIS — M79.18 MYOFASCIAL PAIN: Primary | ICD-10-CM

## 2023-12-07 RX ADMIN — METHYLPREDNISOLONE ACETATE 40 MG: 40 INJECTION, SUSPENSION INTRA-ARTICULAR; INTRALESIONAL; INTRAMUSCULAR; SOFT TISSUE at 08:59

## 2023-12-07 RX ADMIN — BUPIVACAINE HYDROCHLORIDE 95 MG: 5 INJECTION, SOLUTION PERINEURAL at 08:58

## 2023-12-08 RX ORDER — METHYLPREDNISOLONE ACETATE 40 MG/ML
40 INJECTION, SUSPENSION INTRA-ARTICULAR; INTRALESIONAL; INTRAMUSCULAR; SOFT TISSUE ONCE
Status: COMPLETED | OUTPATIENT
Start: 2023-12-08 | End: 2023-12-07

## 2023-12-08 RX ORDER — BUPIVACAINE HYDROCHLORIDE 5 MG/ML
19 INJECTION, SOLUTION PERINEURAL ONCE
Status: COMPLETED | OUTPATIENT
Start: 2023-12-08 | End: 2023-12-07

## 2023-12-29 ENCOUNTER — TELEPHONE (OUTPATIENT)
Age: 33
End: 2023-12-29

## 2023-12-29 DIAGNOSIS — G43.009 MIGRAINE WITHOUT AURA AND WITHOUT STATUS MIGRAINOSUS, NOT INTRACTABLE: ICD-10-CM

## 2023-12-29 DIAGNOSIS — M79.18 MYOFASCIAL PAIN: Primary | ICD-10-CM

## 2023-12-29 RX ORDER — RIZATRIPTAN BENZOATE 10 MG/1
10 TABLET, ORALLY DISINTEGRATING ORAL DAILY PRN
Qty: 9 TABLET | Refills: 0 | Status: SHIPPED | OUTPATIENT
Start: 2023-12-29 | End: 2023-12-29 | Stop reason: SDUPTHER

## 2023-12-29 RX ORDER — RIZATRIPTAN BENZOATE 10 MG/1
10 TABLET, ORALLY DISINTEGRATING ORAL DAILY PRN
Qty: 2 TABLET | Refills: 0 | Status: SHIPPED | OUTPATIENT
Start: 2023-12-29 | End: 2024-01-28

## 2024-01-05 ENCOUNTER — TELEPHONE (OUTPATIENT)
Age: 34
End: 2024-01-05

## 2024-01-05 DIAGNOSIS — F90.2 ATTENTION-DEFICIT HYPERACTIVITY DISORDER, COMBINED TYPE: ICD-10-CM

## 2024-01-08 RX ORDER — DEXTROAMPHETAMINE SACCHARATE, AMPHETAMINE ASPARTATE, DEXTROAMPHETAMINE SULFATE AND AMPHETAMINE SULFATE 2.5; 2.5; 2.5; 2.5 MG/1; MG/1; MG/1; MG/1
TABLET ORAL
Qty: 68 TABLET | Refills: 0 | Status: SHIPPED | OUTPATIENT
Start: 2024-01-08 | End: 2024-02-07

## 2024-01-18 DIAGNOSIS — F90.2 ATTENTION-DEFICIT HYPERACTIVITY DISORDER, COMBINED TYPE: ICD-10-CM

## 2024-01-18 DIAGNOSIS — F90.2 ATTENTION-DEFICIT HYPERACTIVITY DISORDER, COMBINED TYPE: Primary | ICD-10-CM

## 2024-01-18 RX ORDER — DEXTROAMPHETAMINE SACCHARATE, AMPHETAMINE ASPARTATE, DEXTROAMPHETAMINE SULFATE AND AMPHETAMINE SULFATE 2.5; 2.5; 2.5; 2.5 MG/1; MG/1; MG/1; MG/1
TABLET ORAL
Qty: 68 TABLET | Refills: 0 | OUTPATIENT
Start: 2024-01-18 | End: 2024-02-17

## 2024-01-18 RX ORDER — DEXTROAMPHETAMINE SACCHARATE, AMPHETAMINE ASPARTATE, DEXTROAMPHETAMINE SULFATE AND AMPHETAMINE SULFATE 5; 5; 5; 5 MG/1; MG/1; MG/1; MG/1
10 TABLET ORAL 2 TIMES DAILY
Qty: 30 TABLET | Refills: 0 | Status: SHIPPED | OUTPATIENT
Start: 2024-01-18 | End: 2024-02-17

## 2024-01-18 NOTE — TELEPHONE ENCOUNTER
Patient was calling to check on refill of medication of ADDERALL     We can call this into:   GIAN[facility]:  [address] [address]     Phone: [phone]

## 2024-01-23 RX ORDER — ONDANSETRON 4 MG/1
TABLET, ORALLY DISINTEGRATING ORAL
Qty: 30 TABLET | Refills: 4 | Status: SHIPPED | OUTPATIENT
Start: 2024-01-23

## 2024-01-26 ENCOUNTER — TELEMEDICINE (OUTPATIENT)
Age: 34
End: 2024-01-26

## 2024-01-26 DIAGNOSIS — M54.2 NECK PAIN: ICD-10-CM

## 2024-01-26 DIAGNOSIS — R20.0 RIGHT LEG NUMBNESS: Primary | ICD-10-CM

## 2024-01-26 DIAGNOSIS — M79.602 LEFT ARM PAIN: ICD-10-CM

## 2024-01-26 NOTE — PROGRESS NOTES
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 and on the day she is up and moving more the next days things can be worse    She also has pain in the left side of her neck and shooting pain down the shoulder and shoulder blade and down into the bicep  This comes and goes throughout the day and nothing can really help this feel better either  She does have some pain in her neck with flexion and extension  She never had any kind of x-rays  We did have an MRI of her brain a couple years ago which was read normal  She does have a family history with her brother having MS and is concerned about this type process but her MRIs have been normal in the past  She is going to be working soon with [employer] and is excited about this  Nothing else is really helping the symptoms  She does have a lot of good questions about her health and test results and testing    Review of Systems    Prior to Visit Medications    Medication Sig Taking? Authorizing Provider   ondansetron (ZOFRAN-ODT) 4 MG disintegrating tablet DISSOLVE 1 TO 2 TABLETS ON THE TONGUE EVERY 8 HOURS AS NEEDED  Yves Eden, APRN - NP   amphetamine-dextroamphetamine (ADDERALL, 20MG,) 20 MG tablet Take 0.5 tablets by mouth 2 times daily for 30 days. Max Daily Amount: 20 mg  Hallie Jenkins MD   rizatriptan (MAXALT-MLT) 10 MG disintegrating tablet Take 1 tablet by mouth

## 2024-01-29 ENCOUNTER — PROCEDURE VISIT (OUTPATIENT)
Age: 34
End: 2024-01-29
Payer: COMMERCIAL

## 2024-01-29 DIAGNOSIS — R20.0 RIGHT LEG NUMBNESS: ICD-10-CM

## 2024-01-29 DIAGNOSIS — M54.2 NECK PAIN: Primary | ICD-10-CM

## 2024-01-29 PROCEDURE — 95886 MUSC TEST DONE W/N TEST COMP: CPT | Performed by: PSYCHIATRY & NEUROLOGY

## 2024-01-29 PROCEDURE — 95911 NRV CNDJ TEST 9-10 STUDIES: CPT | Performed by: PSYCHIATRY & NEUROLOGY

## 2024-01-29 NOTE — PROGRESS NOTES
[facility]  [facility]  [address] [place] [address], [address]  [place] [address]  Phone [phone] Fax [phone]  Test Date:  2024    Patient: Rina Mcginnis : 1990 Physician: Maged Rodriguez MD   Sex: Female Height: 5' 7\" Ref Phys: Yves Eden, NP   ID#: [id number] Weight: 140 lbs. 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  She has been experiencing numbness in the right leg below the knee and also has chronic low back pain.  In addition she has pain that radiates down from the left side of her neck down into her shoulder/arm.  It gets aggravated if she presses on the left side of her spine or with certain postures.       NCV & EMG Findings:  All nerve conduction studies were within normal limits.      All F Wave latencies were within normal limits.      Needle evaluation of the right low lumbosacral paraspinal muscle showed increased insertional activity.  All remaining muscles (as indicated in the following table) showed no evidence of electrical instability.      Impression:    Overall unremarkable study except for few acute denervation changes seen in the L5 paraspinal region on the left.  This may be due to an underlying radiculopathy at this level.    Otherwise, no evidence of a nerve entrapment, peripheral neuropathy or cervical radiculopathy on the left.       Recommendations:  Consider PT for lower back and/or MRI lumbar spine    ___________________________  Maged Rodriguez MD        Nerve Conduction Studies  Anti Sensory Summary Table     Stim Site NR Peak (ms) Norm Peak (ms) P-T Amp (µV) Norm P-T Amp Onset (ms) Site1 Site2 Delta-P (ms) Dist (cm) Toro (m/s) Norm Toro (m/s)   Left Median Anti Sensory (2nd Digit)  33.3 °C   Wrist    3.1 <3.6 32.3 >10 2.4 Wrist 2nd Digit 3.1 14.0 45 >39   Left Radial Anti Sensory (Base 1st Digit)  33.7 °C   Wrist    2.2

## 2024-01-30 DIAGNOSIS — H53.9 VISUAL CHANGES: ICD-10-CM

## 2024-01-30 DIAGNOSIS — F07.81 POST CONCUSSIVE SYNDROME: Primary | ICD-10-CM
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
## 2024-02-01 DIAGNOSIS — G43.909 MIGRAINE WITHOUT STATUS MIGRAINOSUS, NOT INTRACTABLE, UNSPECIFIED MIGRAINE TYPE: Primary | ICD-10-CM

## 2024-02-01 RX ORDER — BUTALBITAL, ACETAMINOPHEN AND CAFFEINE 50; 325; 40 MG/1; MG/1; MG/1
1 TABLET ORAL EVERY 6 HOURS PRN
Qty: 60 TABLET | Refills: 4 | Status: SHIPPED | OUTPATIENT
Start: 2024-02-01

## 2024-02-08 DIAGNOSIS — F90.2 ATTENTION-DEFICIT HYPERACTIVITY DISORDER, COMBINED TYPE: ICD-10-CM

## 2024-02-08 RX ORDER — DEXTROAMPHETAMINE SACCHARATE, AMPHETAMINE ASPARTATE, DEXTROAMPHETAMINE SULFATE AND AMPHETAMINE SULFATE 1.25; 1.25; 1.25; 1.25 MG/1; MG/1; MG/1; MG/1
TABLET ORAL
Qty: 48 TABLET | Refills: 0 | Status: SHIPPED | OUTPATIENT
Start: 2024-02-08 | End: 2024-05-08

## 2024-02-11 ENCOUNTER — PATIENT MESSAGE (OUTPATIENT)
Age: 34
End: 2024-02-11

## 2024-02-11 DIAGNOSIS — F90.2 ATTENTION-DEFICIT HYPERACTIVITY DISORDER, COMBINED TYPE: ICD-10-CM

## 2024-02-12 DIAGNOSIS — F41.9 ANXIETY: ICD-10-CM

## 2024-02-13 RX ORDER — DEXTROAMPHETAMINE SACCHARATE, AMPHETAMINE ASPARTATE, DEXTROAMPHETAMINE SULFATE AND AMPHETAMINE SULFATE 5; 5; 5; 5 MG/1; MG/1; MG/1; MG/1
10 TABLET ORAL 2 TIMES DAILY
Qty: 30 TABLET | Refills: 0 | Status: SHIPPED | OUTPATIENT
Start: 2024-02-13 | End: 2024-03-14

## 2024-02-13 NOTE — TELEPHONE ENCOUNTER
From: Rina Mcginnis  To: Dr. Hallie Jenkins  Sent: 2/11/2024 5:59 PM EST  Subject: Upcoming appt    Camilo Sterling,    I have an upcoming appointment for my medication follow-up. However, I have just changed jobs from [employer] to [employer]. My start date was 02/05/24.     I won’t have my new insurance active until 03/01/24. Can we make an appointment for when I have insurance to cover it, please?     I will still needs a refill of my Zoloft 50mg and Adderall 10mg BID to get me through (Adderall refill due 02/18/24).     Thanks,    Rina Mcginnis

## 2024-02-26 RX ORDER — MELOXICAM 15 MG/1
15 TABLET ORAL DAILY
Qty: 90 TABLET | Refills: 1 | Status: SHIPPED | OUTPATIENT
Start: 2024-02-26

## 2024-03-06 DIAGNOSIS — F90.2 ATTENTION-DEFICIT HYPERACTIVITY DISORDER, COMBINED TYPE: ICD-10-CM

## 2024-03-06 DIAGNOSIS — F41.9 ANXIETY: ICD-10-CM

## 2024-03-07 RX ORDER — DEXTROAMPHETAMINE SACCHARATE, AMPHETAMINE ASPARTATE, DEXTROAMPHETAMINE SULFATE AND AMPHETAMINE SULFATE 1.25; 1.25; 1.25; 1.25 MG/1; MG/1; MG/1; MG/1
TABLET ORAL
Qty: 48 TABLET | Refills: 0 | Status: SHIPPED | OUTPATIENT
Start: 2024-03-07 | End: 2024-06-04

## 2024-03-07 RX ORDER — CLONAZEPAM 0.5 MG/1
TABLET ORAL
Qty: 10 TABLET | Refills: 0 | Status: SHIPPED | OUTPATIENT
Start: 2024-03-07 | End: 2024-06-11

## 2024-03-13 DIAGNOSIS — G43.909 MIGRAINE WITHOUT STATUS MIGRAINOSUS, NOT INTRACTABLE, UNSPECIFIED MIGRAINE TYPE: ICD-10-CM

## 2024-03-13 RX ORDER — FREMANEZUMAB-VFRM 225 MG/1.5ML
225 INJECTION SUBCUTANEOUS
Qty: 1.5 ML | Refills: 5 | Status: SHIPPED | OUTPATIENT
Start: 2024-03-13

## 2024-03-17 DIAGNOSIS — F90.2 ATTENTION-DEFICIT HYPERACTIVITY DISORDER, COMBINED TYPE: ICD-10-CM

## 2024-03-18 RX ORDER — DEXTROAMPHETAMINE SACCHARATE, AMPHETAMINE ASPARTATE, DEXTROAMPHETAMINE SULFATE AND AMPHETAMINE SULFATE 5; 5; 5; 5 MG/1; MG/1; MG/1; MG/1
10 TABLET ORAL 2 TIMES DAILY
Qty: 30 TABLET | Refills: 0 | Status: SHIPPED | OUTPATIENT
Start: 2024-03-18 | End: 2024-04-17

## 2024-04-04 ENCOUNTER — TELEMEDICINE (OUTPATIENT)
Age: 34
End: 2024-04-04

## 2024-04-04 DIAGNOSIS — L65.9 ALOPECIA: Primary | ICD-10-CM

## 2024-04-04 DIAGNOSIS — K29.60 REFLUX GASTRITIS: ICD-10-CM

## 2024-04-04 DIAGNOSIS — F90.2 ATTENTION-DEFICIT HYPERACTIVITY DISORDER, COMBINED TYPE: ICD-10-CM

## 2024-04-04 PROCEDURE — 99214 OFFICE O/P EST MOD 30 MIN: CPT | Performed by: INTERNAL MEDICINE

## 2024-04-04 RX ORDER — DEXTROAMPHETAMINE SACCHARATE, AMPHETAMINE ASPARTATE, DEXTROAMPHETAMINE SULFATE AND AMPHETAMINE SULFATE 1.25; 1.25; 1.25; 1.25 MG/1; MG/1; MG/1; MG/1
TABLET ORAL
Qty: 90 TABLET | Refills: 0 | Status: SHIPPED | OUTPATIENT
Start: 2024-04-04 | End: 2024-07-02

## 2024-04-04 RX ORDER — PANTOPRAZOLE SODIUM 40 MG/1
40 TABLET, DELAYED RELEASE ORAL
Qty: 90 TABLET | Refills: 1 | Status: SHIPPED | OUTPATIENT
Start: 2024-04-04

## 2024-04-04 ASSESSMENT — PATIENT HEALTH QUESTIONNAIRE - PHQ9
SUM OF ALL RESPONSES TO PHQ QUESTIONS 1-9: 0
SUM OF ALL RESPONSES TO PHQ9 QUESTIONS 1 & 2: 0
SUM OF ALL RESPONSES TO PHQ QUESTIONS 1-9: 0
2. FEELING DOWN, DEPRESSED OR HOPELESS: NOT AT ALL
SUM OF ALL RESPONSES TO PHQ QUESTIONS 1-9: 0
SUM OF ALL RESPONSES TO PHQ QUESTIONS 1-9: 0
1. LITTLE INTEREST OR PLEASURE IN DOING THINGS: NOT AT ALL

## 2024-04-04 NOTE — PROGRESS NOTES
ASSESSMENT & PLAN:  1. Alopecia  Notes increase in hair loss  Has not had labs-     CBC; Future  -     Comprehensive Metabolic Panel; Future  -     TSH; Future  -     T4, Free; Future  -     Lipid Panel; Future  2. Attention-deficit hyperactivity disorder, combined type  Overall controlled    Plan for her medication is to take 20 mg 1/2 tablet twice a day.    She reports the 10 mg (1/2 of the 20 mg) is not as efficacious in the morning.    Discussed with patient and she can take 5 mg with the 10 mg in the morning to see if this helps.    So she will take: Adderall 10 mg twice a day plus additional 5 mg tablet in the morning.    Encouraged exercise with her [employer] in the morning before she starts her work.    -     amphetamine-dextroamphetamine (ADDERALL, 5MG,) 5 MG tablet; Take 1 po at noon as directed, Disp-90 tablet, R-0Normal  3. Reflux gastritis  Has been having symptoms despite Prilosec 20 mg.    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  She is at the [employer]'s [employer].    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  She notes this is not stressful.      Reflux  She reports she has been having some reflux and taking Prilosec 20 mg.  She hears a gurgling sound anytime she lays down but not when she is sitting up.  Symptoms are happening periodically.  She is not having any fevers night sweats or chills.  She is not having any abdominal pain.  She is not regurgitating food.  Of note her brother and father do have a history of this and some regurgitation but no stricture      Attention deficit  Work is not as stressful but feels the 10 mg of Adderall in the

## 2024-04-05 DIAGNOSIS — G43.009 MIGRAINE WITHOUT AURA AND WITHOUT STATUS MIGRAINOSUS, NOT INTRACTABLE: ICD-10-CM

## 2024-04-09 RX ORDER — BACLOFEN 10 MG/1
TABLET ORAL
Qty: 60 TABLET | Refills: 1 | Status: SHIPPED | OUTPATIENT
Start: 2024-04-09

## 2024-04-09 RX ORDER — RIZATRIPTAN BENZOATE 10 MG/1
TABLET, ORALLY DISINTEGRATING ORAL
Qty: 9 TABLET | Refills: 1 | Status: SHIPPED | OUTPATIENT
Start: 2024-04-09

## 2024-04-26 DIAGNOSIS — F90.2 ATTENTION-DEFICIT HYPERACTIVITY DISORDER, COMBINED TYPE: ICD-10-CM

## 2024-04-29 ENCOUNTER — PATIENT MESSAGE (OUTPATIENT)
Age: 34
End: 2024-04-29

## 2024-04-29 DIAGNOSIS — F90.2 ATTENTION-DEFICIT HYPERACTIVITY DISORDER, COMBINED TYPE: ICD-10-CM

## 2024-04-29 NOTE — TELEPHONE ENCOUNTER
From: Rina Mcginnis  To: Dr. Hallie Jenkins  Sent: 4/29/2024 1:16 PM EDT  Subject: Adderall refill    Hi—    The pharmacy said they never received the script for Adderall 20mg, 1/2 tablet twice a day TEVA brand. Can you please resend?    [facility]  [address] [place] [address]        Thanks,    Rina

## 2024-04-30 RX ORDER — DEXTROAMPHETAMINE SACCHARATE, AMPHETAMINE ASPARTATE, DEXTROAMPHETAMINE SULFATE AND AMPHETAMINE SULFATE 5; 5; 5; 5 MG/1; MG/1; MG/1; MG/1
10 TABLET ORAL 2 TIMES DAILY
Qty: 30 TABLET | Refills: 0 | Status: SHIPPED | OUTPATIENT
Start: 2024-04-30 | End: 2024-04-30 | Stop reason: SDUPTHER

## 2024-04-30 RX ORDER — DEXTROAMPHETAMINE SACCHARATE, AMPHETAMINE ASPARTATE, DEXTROAMPHETAMINE SULFATE AND AMPHETAMINE SULFATE 5; 5; 5; 5 MG/1; MG/1; MG/1; MG/1
10 TABLET ORAL 2 TIMES DAILY
Qty: 30 TABLET | Refills: 0 | Status: SHIPPED | OUTPATIENT
Start: 2024-04-30 | End: 2024-05-30

## 2024-05-14 ENCOUNTER — TELEPHONE (OUTPATIENT)
Age: 34
End: 2024-05-14

## 2024-05-20 DIAGNOSIS — M79.18 MYOFASCIAL PAIN: ICD-10-CM

## 2024-05-20 DIAGNOSIS — M25.552 BILATERAL HIP PAIN: ICD-10-CM

## 2024-05-20 DIAGNOSIS — M54.2 NECK PAIN: Primary | ICD-10-CM

## 2024-05-20 DIAGNOSIS — F90.2 ATTENTION-DEFICIT HYPERACTIVITY DISORDER, COMBINED TYPE: ICD-10-CM

## 2024-05-20 DIAGNOSIS — G43.009 MIGRAINE WITHOUT AURA AND WITHOUT STATUS MIGRAINOSUS, NOT INTRACTABLE: ICD-10-CM

## 2024-05-20 DIAGNOSIS — M25.551 BILATERAL HIP PAIN: ICD-10-CM

## 2024-05-20 DIAGNOSIS — F41.9 ANXIETY: ICD-10-CM

## 2024-05-20 DIAGNOSIS — M54.50 CHRONIC MIDLINE LOW BACK PAIN, UNSPECIFIED WHETHER SCIATICA PRESENT: ICD-10-CM

## 2024-05-20 DIAGNOSIS — G89.29 CHRONIC MIDLINE LOW BACK PAIN, UNSPECIFIED WHETHER SCIATICA PRESENT: ICD-10-CM

## 2024-05-20 RX ORDER — RIZATRIPTAN BENZOATE 10 MG/1
TABLET, ORALLY DISINTEGRATING ORAL
Qty: 9 TABLET | Refills: 5 | Status: SHIPPED | OUTPATIENT
Start: 2024-05-20

## 2024-05-21 RX ORDER — DEXTROAMPHETAMINE SACCHARATE, AMPHETAMINE ASPARTATE, DEXTROAMPHETAMINE SULFATE AND AMPHETAMINE SULFATE 5; 5; 5; 5 MG/1; MG/1; MG/1; MG/1
10 TABLET ORAL 2 TIMES DAILY
Qty: 90 TABLET | Refills: 0 | Status: SHIPPED | OUTPATIENT
Start: 2024-05-21 | End: 2024-08-19

## 2024-05-21 RX ORDER — CLONAZEPAM 0.5 MG/1
TABLET ORAL
Qty: 10 TABLET | Refills: 0 | Status: SHIPPED | OUTPATIENT
Start: 2024-05-21 | End: 2024-08-24

## 2024-05-30 PROBLEM — Q79.60 EHLERS-DANLOS SYNDROME: Status: ACTIVE | Noted: 2021-08-02

## 2024-05-30 PROBLEM — L20.9 ATOPIC DERMATITIS: Status: ACTIVE | Noted: 2024-05-30

## 2024-06-21 ENCOUNTER — PATIENT MESSAGE (OUTPATIENT)
Age: 34
End: 2024-06-21

## 2024-06-21 DIAGNOSIS — F90.2 ATTENTION-DEFICIT HYPERACTIVITY DISORDER, COMBINED TYPE: ICD-10-CM

## 2024-06-21 DIAGNOSIS — G43.009 MIGRAINE WITHOUT AURA AND WITHOUT STATUS MIGRAINOSUS, NOT INTRACTABLE: ICD-10-CM

## 2024-06-21 DIAGNOSIS — F41.9 ANXIETY: ICD-10-CM

## 2024-06-21 DIAGNOSIS — F41.9 ANXIETY: Primary | ICD-10-CM

## 2024-06-21 NOTE — TELEPHONE ENCOUNTER
From: Rina Mcginnis  To: Dr. Hallie Jenkins  Sent: 6/21/2024 2:23 PM EDT  Subject: Zoloft    Hi,    Can you please refill my Zoloft 50mg tablets?    [facility]  [address] [place] [address]    Thanks,    Rina

## 2024-06-24 RX ORDER — RIZATRIPTAN BENZOATE 10 MG/1
TABLET, ORALLY DISINTEGRATING ORAL
Qty: 9 TABLET | Refills: 5 | Status: SHIPPED | OUTPATIENT
Start: 2024-06-24

## 2024-06-24 RX ORDER — DEXTROAMPHETAMINE SACCHARATE, AMPHETAMINE ASPARTATE, DEXTROAMPHETAMINE SULFATE AND AMPHETAMINE SULFATE 5; 5; 5; 5 MG/1; MG/1; MG/1; MG/1
10 TABLET ORAL 2 TIMES DAILY
Qty: 90 TABLET | Refills: 0 | Status: SHIPPED | OUTPATIENT
Start: 2024-06-24 | End: 2024-09-22

## 2024-06-24 RX ORDER — BUTALBITAL, ACETAMINOPHEN AND CAFFEINE 50; 325; 40 MG/1; MG/1; MG/1
1 TABLET ORAL EVERY 6 HOURS PRN
Qty: 60 TABLET | Refills: 4 | Status: SHIPPED | OUTPATIENT
Start: 2024-06-24 | End: 2024-06-24 | Stop reason: SDUPTHER

## 2024-06-24 RX ORDER — CLONAZEPAM 0.5 MG/1
TABLET ORAL
Qty: 10 TABLET | Refills: 0 | Status: SHIPPED | OUTPATIENT
Start: 2024-06-24 | End: 2024-09-24

## 2024-06-24 RX ORDER — DEXTROAMPHETAMINE SACCHARATE, AMPHETAMINE ASPARTATE, DEXTROAMPHETAMINE SULFATE AND AMPHETAMINE SULFATE 1.25; 1.25; 1.25; 1.25 MG/1; MG/1; MG/1; MG/1
TABLET ORAL
Qty: 90 TABLET | Refills: 0 | Status: SHIPPED | OUTPATIENT
Start: 2024-06-24 | End: 2024-09-21

## 2024-06-24 RX ORDER — BUTALBITAL, ACETAMINOPHEN AND CAFFEINE 50; 325; 40 MG/1; MG/1; MG/1
1 TABLET ORAL EVERY 6 HOURS PRN
Qty: 60 TABLET | Refills: 4 | Status: SHIPPED | OUTPATIENT
Start: 2024-06-24

## 2024-07-01 ENCOUNTER — TELEMEDICINE (OUTPATIENT)
Age: 34
End: 2024-07-01

## 2024-07-01 DIAGNOSIS — G43.009 MIGRAINE WITHOUT AURA AND WITHOUT STATUS MIGRAINOSUS, NOT INTRACTABLE: Primary | ICD-10-CM

## 2024-07-01 PROCEDURE — 99214 OFFICE O/P EST MOD 30 MIN: CPT | Performed by: NURSE PRACTITIONER

## 2024-07-08 NOTE — PROGRESS NOTES
2024    TELEHEALTH EVALUATION -- Audio/Visual    HPI:    Rina Mcginnis (:  1990) has requested an audio/video evaluation for the following concern(s):    Patient comes in on a virtual for follow-up for migraines  She is doing actually fairly well off of the Ajovy  She does use the rizatriptan when she needs it for an as needed rescue but it is not really that much  She is working now at [employer] and working days with less stress for sure  She is feeling this is a lot better for her health physically and mentally also    She does not really have much of a concern in regard to her current treatment and feels good with where she is    Review of Systems    Prior to Visit Medications    Medication Sig Taking? Authorizing Provider   clonazePAM (KLONOPIN) 0.5 MG tablet TAKE 1/2 TO 1 TABLET BY MOUTH DAILY AS NEEDED  Hallie Jenkins MD   amphetamine-dextroamphetamine (ADDERALL, 20MG,) 20 MG tablet Take 0.5 tablets by mouth 2 times daily for 90 days. Max Daily Amount: 20 mg  Hallie Jenkins MD   rizatriptan (MAXALT-MLT) 10 MG disintegrating tablet TAKE 1 TABLET AT ONSET OF HEADACHE, MAY REPEAT 1 DOSE AFTER 2 HOURS AS NEEDED  Yves Eden APRN - NP   amphetamine-dextroamphetamine (ADDERALL, 5MG,) 5 MG tablet Take 1 po at noon as directed  Hallie Jenkins MD   butalbital-acetaminophen-caffeine (FIORICET, ESGIC) -40 MG per tablet Take 1 tablet by mouth every 6 hours as needed for Headaches  Hallie Jenkins MD   sertraline (ZOLOFT) 50 MG tablet Take 1 tablet by mouth daily  Hallie Jenkins MD   baclofen (LIORESAL) 10 MG tablet TAKE 1 TABLET BY MOUTH TWICE DAILY AS NEEDED  Yves Eden APRN - NP   pantoprazole (PROTONIX) 40 MG tablet Take 1 tablet by mouth every morning (before breakfast)  Hallie Jenkins MD   meloxicam (MOBIC) 15 MG tablet TAKE 1 TABLET BY MOUTH DAILY  [place], Yves M, APRN - NP   ondansetron (ZOFRAN-ODT) 4 MG

## 2024-07-09 DIAGNOSIS — M79.18 MYOFASCIAL PAIN: ICD-10-CM

## 2024-07-09 DIAGNOSIS — M54.12 CERVICAL RADICULOPATHY: ICD-10-CM

## 2024-07-09 DIAGNOSIS — M79.602 LEFT ARM PAIN: ICD-10-CM

## 2024-07-09 DIAGNOSIS — M79.18 MYOFASCIAL PAIN: Primary | ICD-10-CM

## 2024-07-09 RX ORDER — METHYLPREDNISOLONE 4 MG/1
TABLET ORAL
Qty: 1 KIT | Refills: 0 | Status: SHIPPED | OUTPATIENT
Start: 2024-07-09 | End: 2024-07-09 | Stop reason: SDUPTHER

## 2024-07-09 RX ORDER — METHYLPREDNISOLONE 4 MG/1
TABLET ORAL
Qty: 1 KIT | Refills: 0 | Status: SHIPPED | OUTPATIENT
Start: 2024-07-09 | End: 2024-07-15

## 2024-07-10 ENCOUNTER — PATIENT MESSAGE (OUTPATIENT)
Age: 34
End: 2024-07-10

## 2024-07-10 DIAGNOSIS — F41.9 ANXIETY: ICD-10-CM

## 2024-07-10 NOTE — TELEPHONE ENCOUNTER
From: Rina Mcginnis  To: Dr. Hallie Jenkins  Sent: 7/10/2024 9:32 AM EDT  Subject: Zoloft    Hi, can you please resend my Zoloft 50mg prescription to [facility] in [place]? It was sent to [facility] by accident.    [facility]  [address] [place] [address] [phone]    Thanks, Rina

## 2024-07-11 DIAGNOSIS — R94.131 ABNORMAL EMG: Primary | ICD-10-CM

## 2024-07-11 DIAGNOSIS — M79.604 PAIN OF RIGHT LOWER EXTREMITY: ICD-10-CM

## 2024-07-11 DIAGNOSIS — M48.062 SPINAL STENOSIS OF LUMBAR REGION WITH NEUROGENIC CLAUDICATION: ICD-10-CM

## 2024-07-12 DIAGNOSIS — M99.15: ICD-10-CM

## 2024-07-12 DIAGNOSIS — M99.14: ICD-10-CM

## 2024-07-12 DIAGNOSIS — M47.818 ARTHRITIS OF SACRUM: Primary | ICD-10-CM

## 2024-08-01 DIAGNOSIS — F41.9 ANXIETY: ICD-10-CM

## 2024-08-01 RX ORDER — CLONAZEPAM 0.5 MG/1
TABLET ORAL
Qty: 10 TABLET | Refills: 0 | Status: SHIPPED | OUTPATIENT
Start: 2024-08-01 | End: 2024-08-30

## 2024-08-01 NOTE — TELEPHONE ENCOUNTER
PCP: Hallie Jenkins MD    Last appt: 4/4/2024   No future appointments.    Requested Prescriptions     Pending Prescriptions Disp Refills    clonazePAM (KLONOPIN) 0.5 MG tablet [Pharmacy Med Name: CLONAZEPAM 0.5MG TABLETS] 10 tablet      Sig: TAKE 1/2 TO 1 TABLET BY MOUTH DAILY AS NEEDED     Received refill request from [facility] to [facility] on [facility] for clonazepam 0.5mg tabs, 1/2 tab QD PRN, 10 tabs with 0 refills. Rx in pending for review and approval.    Brianna \"Sunny\" GRANT Vasques

## 2024-08-03 DIAGNOSIS — K29.60 REFLUX GASTRITIS: ICD-10-CM

## 2024-08-05 RX ORDER — PANTOPRAZOLE SODIUM 40 MG/1
40 TABLET, DELAYED RELEASE ORAL
Qty: 90 TABLET | Refills: 1 | Status: SHIPPED | OUTPATIENT
Start: 2024-08-05

## 2024-08-05 NOTE — TELEPHONE ENCOUNTER
PCP: Hallie Jenkins MD    Last appt: 4/4/2024   No future appointments.    Requested Prescriptions     Pending Prescriptions Disp Refills    pantoprazole (PROTONIX) 40 MG tablet [Pharmacy Med Name: PANTOPRAZOLE 40MG TABLETS] 90 tablet 1     Sig: TAKE 1 TABLET BY MOUTH EVERY MORNING BEFORE BREAKFAST     Rx in pending for provider review and approval.    Brianna \"Sunny\" GROVER VasquesN

## 2024-08-26 DIAGNOSIS — F90.2 ATTENTION-DEFICIT HYPERACTIVITY DISORDER, COMBINED TYPE: ICD-10-CM

## 2024-08-26 RX ORDER — DEXTROAMPHETAMINE SACCHARATE, AMPHETAMINE ASPARTATE, DEXTROAMPHETAMINE SULFATE AND AMPHETAMINE SULFATE 5; 5; 5; 5 MG/1; MG/1; MG/1; MG/1
20 TABLET ORAL DAILY
Qty: 90 TABLET | Refills: 0 | Status: SHIPPED | OUTPATIENT
Start: 2024-08-26 | End: 2024-11-24

## 2024-08-26 RX ORDER — DEXTROAMPHETAMINE SACCHARATE, AMPHETAMINE ASPARTATE, DEXTROAMPHETAMINE SULFATE AND AMPHETAMINE SULFATE 1.25; 1.25; 1.25; 1.25 MG/1; MG/1; MG/1; MG/1
TABLET ORAL
Qty: 90 TABLET | Refills: 0 | Status: SHIPPED | OUTPATIENT
Start: 2024-08-26 | End: 2024-11-23

## 2024-09-16 DIAGNOSIS — J35.2 ADENOID ENLARGEMENT: ICD-10-CM

## 2024-09-16 DIAGNOSIS — J35.3 ENLARGEMENT OF TONSILS AND ADENOIDS: ICD-10-CM

## 2024-09-16 DIAGNOSIS — R59.9 SWELLING OF LYMPH NODES: Primary | ICD-10-CM

## 2024-10-18 DIAGNOSIS — G43.009 MIGRAINE WITHOUT AURA AND WITHOUT STATUS MIGRAINOSUS, NOT INTRACTABLE: ICD-10-CM

## 2024-10-18 DIAGNOSIS — F41.9 ANXIETY: ICD-10-CM

## 2024-10-21 DIAGNOSIS — F41.9 ANXIETY: ICD-10-CM

## 2024-10-21 RX ORDER — RIZATRIPTAN BENZOATE 10 MG/1
TABLET, ORALLY DISINTEGRATING ORAL
Qty: 9 TABLET | Refills: 5 | Status: SHIPPED | OUTPATIENT
Start: 2024-10-21

## 2024-10-21 RX ORDER — ONDANSETRON 4 MG/1
TABLET, ORALLY DISINTEGRATING ORAL
Qty: 30 TABLET | Refills: 4 | Status: SHIPPED | OUTPATIENT
Start: 2024-10-21

## 2024-10-23 RX ORDER — CLONAZEPAM 0.5 MG/1
TABLET ORAL
Qty: 10 TABLET | Refills: 0 | Status: SHIPPED | OUTPATIENT
Start: 2024-10-23 | End: 2024-11-16

## 2024-10-23 NOTE — TELEPHONE ENCOUNTER
Please provide 10 tablet day bridge and needs appt. Please forward to psr to schedule and confirm appt.  
1 = Total assistance

## 2024-11-13 ENCOUNTER — PATIENT MESSAGE (OUTPATIENT)
Age: 34
End: 2024-11-13

## 2024-11-13 DIAGNOSIS — F90.2 ATTENTION-DEFICIT HYPERACTIVITY DISORDER, COMBINED TYPE: ICD-10-CM

## 2024-11-14 RX ORDER — DEXTROAMPHETAMINE SACCHARATE, AMPHETAMINE ASPARTATE, DEXTROAMPHETAMINE SULFATE AND AMPHETAMINE SULFATE 5; 5; 5; 5 MG/1; MG/1; MG/1; MG/1
20 TABLET ORAL DAILY
Qty: 90 TABLET | Refills: 0 | Status: SHIPPED | OUTPATIENT
Start: 2024-11-14 | End: 2025-02-12

## 2024-12-17 NOTE — TELEPHONE ENCOUNTER
ANTICOAGULATION MANAGEMENT     Prem Taylor 73 year old male is on warfarin with therapeutic INR result. (Goal INR 2.0-3.0)    Recent labs: (last 7 days)     12/17/24  0714   INR 2.6*       ASSESSMENT     Warfarin Lab Questionnaire    Warfarin Doses Last 7 Days    Pt Rptd Dose SUNDAY MONDAY TUESDAY WED THURS FRIDAY SATURDAY 12/16/2024  11:05 AM 3.75 7.5 3.75 7.5 7.5 3.75 7.5         12/16/2024   Warfarin Lab Questionnaire   Missed doses within past 14 days? No   Changes in diet or alcohol within past 14 days? No   Medication changes since last result? No   Injuries or illness since last result? No   New shortness of breath, severe headaches or sudden changes in vision since last result? No   Abnormal bleeding since last result? No   Upcoming surgery, procedure? No - scheduled for colonoscopy on 1/16/25 with [facility].       - already sent on 12/9/24  to pharmacist, Jessica Orosco [facility] to review 4 days warfarin hold and potential bridge.     Best number to call with results? [phone]        Previous result: Supratherapeutic at 3.1 on 12/3/24.    Additional findings: None       PLAN     Recommended plan for no diet, medication or health factor changes affecting INR     Dosing Instructions: Continue your current warfarin dose with next INR in 3-4 weeks       Summary  As of 12/17/2024      Full warfarin instructions:  3.75 mg every Sun, Tue, Fri; 7.5 mg all other days   Next INR check:  1/14/2025               Telephone call with Ryan who verbalizes understanding and agrees to plan   - will check INR prior to scheduled colonoscopy.    Lab visit scheduled - INR on 1/6/25 @ [facility]    Education provided: Taking warfarin: Importance of taking warfarin as instructed  Goal range and lab monitoring: goal range and significance of current result    Plan made per [facility] anticoagulation protocol    Mouna Min RN  12/17/2024  Anticoagulation Clinic  [facility] for routing messages: elsie MAURICIO  [facility] patient phone  Verbal order per Dr. Edd Avila for calling in medications   Requested Prescriptions     Signed Prescriptions Disp Refills    rizatriptan (MAXALT-MLT) 10 mg disintegrating tablet 30 Tab 0     Sig: Take 1 Tab by mouth once as needed for Migraine for up to 1 dose. Authorizing Provider: Asim Driver    clonazePAM (KLONOPIN) 0.5 mg tablet 30 Tab 0     Sig: Take 1/2 to 1 tab daily ONLY as needed     Authorizing Provider: Asim Driver         Phone in (clonazepam) into pt's local pharmacy. line: [phone]        _______________________________________________________________________     Anticoagulation Episode Summary       Current INR goal:  2.0-3.0   TTR:  72.7% (1 y)   Target end date:  Indefinite   Send INR reminders to:  RUBY MAURICIO    Indications    Pulmonary embolism  bilateral (H) (Resolved) [I26.99]  Permanent atrial fibrillation (H) [I48.21]  Pulmonary embolism  bilateral (H) [I26.99]  History of pulmonary embolism [Z86.711]             Comments:  Approved for 8 week checks per -- see encounter from 07/27/21             Anticoagulation Care Providers       Provider Role Specialty Phone number    Mamadou Baez MD Referring Family Medicine [phone]

## 2024-12-31 DIAGNOSIS — F90.2 ATTENTION-DEFICIT HYPERACTIVITY DISORDER, COMBINED TYPE: ICD-10-CM

## 2024-12-31 DIAGNOSIS — G43.009 MIGRAINE WITHOUT AURA AND WITHOUT STATUS MIGRAINOSUS, NOT INTRACTABLE: ICD-10-CM

## 2024-12-31 RX ORDER — MELOXICAM 15 MG/1
15 TABLET ORAL DAILY
Qty: 90 TABLET | Refills: 1 | OUTPATIENT
Start: 2024-12-31

## 2024-12-31 RX ORDER — ONDANSETRON 4 MG/1
TABLET, ORALLY DISINTEGRATING ORAL
Qty: 30 TABLET | Refills: 4 | OUTPATIENT
Start: 2024-12-31

## 2024-12-31 RX ORDER — DEXTROAMPHETAMINE SACCHARATE, AMPHETAMINE ASPARTATE, DEXTROAMPHETAMINE SULFATE AND AMPHETAMINE SULFATE 1.25; 1.25; 1.25; 1.25 MG/1; MG/1; MG/1; MG/1
TABLET ORAL
Qty: 30 TABLET | Refills: 0 | Status: SHIPPED | OUTPATIENT
Start: 2024-12-31 | End: 2025-03-30

## 2024-12-31 RX ORDER — RIZATRIPTAN BENZOATE 10 MG/1
TABLET, ORALLY DISINTEGRATING ORAL
Qty: 9 TABLET | Refills: 5 | OUTPATIENT
Start: 2024-12-31

## 2025-01-15 ENCOUNTER — OFFICE VISIT (OUTPATIENT)
Facility: CLINIC | Age: 35
End: 2025-01-15
Payer: COMMERCIAL

## 2025-01-15 ENCOUNTER — PATIENT MESSAGE (OUTPATIENT)
Facility: CLINIC | Age: 35
End: 2025-01-15

## 2025-01-15 VITALS
DIASTOLIC BLOOD PRESSURE: 90 MMHG | HEART RATE: 99 BPM | RESPIRATION RATE: 14 BRPM | WEIGHT: 179 LBS | OXYGEN SATURATION: 100 % | HEIGHT: 67 IN | BODY MASS INDEX: 28.09 KG/M2 | SYSTOLIC BLOOD PRESSURE: 120 MMHG

## 2025-01-15 DIAGNOSIS — F41.9 ANXIETY: Primary | ICD-10-CM

## 2025-01-15 DIAGNOSIS — K21.00 GASTROESOPHAGEAL REFLUX DISEASE WITH ESOPHAGITIS WITHOUT HEMORRHAGE: Primary | ICD-10-CM

## 2025-01-15 DIAGNOSIS — F90.2 ATTENTION-DEFICIT HYPERACTIVITY DISORDER, COMBINED TYPE: ICD-10-CM

## 2025-01-15 DIAGNOSIS — F41.9 ANXIETY: ICD-10-CM

## 2025-01-15 LAB
11-NOR-9-THC-9-COOH, POC: NEGATIVE
AMPHETAMINE, URINE, POC: NORMAL
BENZODIAZEPINES, URINE, POC: NORMAL
BENZOYLECGONINE, URINE, POC: NEGATIVE
METHADONE, URINE, POC: NEGATIVE
METHAMPHETAMINE, URINE, POC: NEGATIVE
MORPHINE, URINE, POC: NEGATIVE
PHENCYCLIDINE, URINE, POC: NEGATIVE
URINALYSIS COLOR, POC: YELLOW

## 2025-01-15 PROCEDURE — 80305 DRUG TEST PRSMV DIR OPT OBS: CPT | Performed by: INTERNAL MEDICINE

## 2025-01-15 PROCEDURE — 99214 OFFICE O/P EST MOD 30 MIN: CPT | Performed by: INTERNAL MEDICINE

## 2025-01-15 RX ORDER — CLONAZEPAM 0.5 MG/1
TABLET ORAL
Qty: 10 TABLET | Refills: 0 | Status: SHIPPED | OUTPATIENT
Start: 2025-01-15 | End: 2025-02-08

## 2025-01-15 RX ORDER — DEXTROAMPHETAMINE SACCHARATE, AMPHETAMINE ASPARTATE MONOHYDRATE, DEXTROAMPHETAMINE SULFATE AND AMPHETAMINE SULFATE 5; 5; 5; 5 MG/1; MG/1; MG/1; MG/1
20 CAPSULE, EXTENDED RELEASE ORAL DAILY
Qty: 30 CAPSULE | Refills: 0 | Status: SHIPPED | OUTPATIENT
Start: 2025-01-15 | End: 2025-02-14

## 2025-01-15 SDOH — ECONOMIC STABILITY: FOOD INSECURITY: WITHIN THE PAST 12 MONTHS, YOU WORRIED THAT YOUR FOOD WOULD RUN OUT BEFORE YOU GOT MONEY TO BUY MORE.: NEVER TRUE

## 2025-01-15 ASSESSMENT — PATIENT HEALTH QUESTIONNAIRE - PHQ9
SUM OF ALL RESPONSES TO PHQ QUESTIONS 1-9: 0
1. LITTLE INTEREST OR PLEASURE IN DOING THINGS: NOT AT ALL
SUM OF ALL RESPONSES TO PHQ9 QUESTIONS 1 & 2: 0
2. FEELING DOWN, DEPRESSED OR HOPELESS: NOT AT ALL

## 2025-01-15 NOTE — PROGRESS NOTES
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  She has been with Jude and his to little girls for over 2 years and she is in a very good place.    1. Attention deficit hyperactivity disorder (ADHD).  A transition from Adderall 20 mg to Adderall XR 40 mg has been proposed. The conversion will be confirmed with the pharmacist. If the dosage is deemed excessive, it can be reduced to 30 mg. She has been advised to consult with her pharmacy regarding insurance coverage for Vyvanse. A urine drug screen will be conducted today.  She reports there may be THC    2. Anxiety.  A prescription for clonazepam 10 tablets has been provided, which should suffice for approximately 3 months.    3. Migraines.  She is currently undergoing physical therapy for migraines, which has been effective. She uses Fioricet rarely due to its side effects and has a supply of baclofen for use as needed.  She has an underlying history of hypermobility which may be triggering some of her migraines    4. Reflux esophagitis.  A referral to a gastroenterologist has been made for further evaluation and management of her reflux symptoms, which have not improved despite a 3-month course of Protonix 40 mg.    5. Elevated blood pressure.  Her systolic blood pressure is within normal range, however, her diastolic blood pressure is slightly elevated. She has been advised to maintain a log of her blood pressure and heart rate for a week and submit the results for review.  She will send me a copy of her blood pressure and heart rate.    6. Health maintenance.  Labs will be ordered to check liver, kidney, glucose, cholesterol, TSH, T4, and CBC levels. A urine drug screen will be conducted today.      Chief Complaint   Patient presents with    Medication Check     Jude 2 years: 3.5, 5.5  Full time 4 10's--hybrid 2 days at home and 2 days in office

## 2025-02-11 DIAGNOSIS — G43.009 MIGRAINE WITHOUT AURA AND WITHOUT STATUS MIGRAINOSUS, NOT INTRACTABLE: ICD-10-CM

## 2025-02-11 RX ORDER — ONDANSETRON 4 MG/1
TABLET, ORALLY DISINTEGRATING ORAL
Qty: 30 TABLET | Refills: 4 | Status: SHIPPED | OUTPATIENT
Start: 2025-02-11

## 2025-02-11 RX ORDER — RIZATRIPTAN BENZOATE 10 MG/1
TABLET, ORALLY DISINTEGRATING ORAL
Qty: 9 TABLET | Refills: 5 | Status: SHIPPED | OUTPATIENT
Start: 2025-02-11

## 2025-02-12 RX ORDER — BUTALBITAL, ACETAMINOPHEN AND CAFFEINE 50; 325; 40 MG/1; MG/1; MG/1
1 TABLET ORAL EVERY 6 HOURS PRN
Qty: 60 TABLET | Refills: 4 | Status: SHIPPED | OUTPATIENT
Start: 2025-02-12

## 2025-02-19 DIAGNOSIS — F90.2 ATTENTION-DEFICIT HYPERACTIVITY DISORDER, COMBINED TYPE: ICD-10-CM

## 2025-02-19 RX ORDER — DEXTROAMPHETAMINE SACCHARATE, AMPHETAMINE ASPARTATE MONOHYDRATE, DEXTROAMPHETAMINE SULFATE AND AMPHETAMINE SULFATE 5; 5; 5; 5 MG/1; MG/1; MG/1; MG/1
20 CAPSULE, EXTENDED RELEASE ORAL DAILY
Qty: 30 CAPSULE | Refills: 0 | Status: CANCELLED | OUTPATIENT
Start: 2025-02-19 | End: 2025-03-21

## 2025-02-20 DIAGNOSIS — F90.2 ATTENTION-DEFICIT HYPERACTIVITY DISORDER, COMBINED TYPE: ICD-10-CM

## 2025-02-20 RX ORDER — DEXTROAMPHETAMINE SACCHARATE, AMPHETAMINE ASPARTATE MONOHYDRATE, DEXTROAMPHETAMINE SULFATE AND AMPHETAMINE SULFATE 5; 5; 5; 5 MG/1; MG/1; MG/1; MG/1
20 CAPSULE, EXTENDED RELEASE ORAL DAILY
Qty: 30 CAPSULE | Refills: 0 | Status: SHIPPED | OUTPATIENT
Start: 2025-02-20 | End: 2025-03-22

## 2025-02-28 ENCOUNTER — PATIENT MESSAGE (OUTPATIENT)
Age: 35
End: 2025-02-28

## 2025-03-05 ENCOUNTER — OFFICE VISIT (OUTPATIENT)
Age: 35
End: 2025-03-05
Payer: COMMERCIAL

## 2025-03-05 VITALS
OXYGEN SATURATION: 99 % | HEART RATE: 100 BPM | SYSTOLIC BLOOD PRESSURE: 124 MMHG | DIASTOLIC BLOOD PRESSURE: 78 MMHG | TEMPERATURE: 96.9 F

## 2025-03-05 DIAGNOSIS — M54.2 NECK PAIN: Primary | ICD-10-CM

## 2025-03-05 DIAGNOSIS — M79.18 MYOFASCIAL PAIN: ICD-10-CM

## 2025-03-05 PROCEDURE — 99214 OFFICE O/P EST MOD 30 MIN: CPT | Performed by: NURSE PRACTITIONER

## 2025-03-05 NOTE — PROGRESS NOTES
Results   Component Value Date    VLDL 9.4 09/18/2020     Lab Results   Component Value Date    CHOLHDLRATIO 1.9 09/18/2020       Lab Results   Component Value Date    SEDRATE 11 11/12/2021       No results found for: \"LABA1C\"  Lab Results   Component Value Date    ROMAIN Negative 11/12/2021                1. Neck pain  -     External Referral To Physical Therapy  2. Myofascial pain  -     External Referral To Physical Therapy     3. Migraine       Refer to PT for neck, myofascial pain, needling   Within [facility] network     TPI stable.   Using Maxalt PRN     Fill out paperwork as needed.         This note was created using voice recognition software. Despite editing, there may be syntax errors.

## 2025-03-18 ENCOUNTER — PATIENT MESSAGE (OUTPATIENT)
Age: 35
End: 2025-03-18

## 2025-03-18 DIAGNOSIS — G43.009 MIGRAINE WITHOUT AURA AND WITHOUT STATUS MIGRAINOSUS, NOT INTRACTABLE: Primary | ICD-10-CM

## 2025-03-18 RX ORDER — PREDNISONE 10 MG/1
TABLET ORAL
Qty: 42 TABLET | Refills: 0 | Status: SHIPPED | OUTPATIENT
Start: 2025-03-18

## 2025-03-20 DIAGNOSIS — F90.2 ATTENTION-DEFICIT HYPERACTIVITY DISORDER, COMBINED TYPE: ICD-10-CM

## 2025-03-21 RX ORDER — DEXTROAMPHETAMINE SACCHARATE, AMPHETAMINE ASPARTATE MONOHYDRATE, DEXTROAMPHETAMINE SULFATE AND AMPHETAMINE SULFATE 5; 5; 5; 5 MG/1; MG/1; MG/1; MG/1
20 CAPSULE, EXTENDED RELEASE ORAL DAILY
Qty: 30 CAPSULE | Refills: 0 | Status: SHIPPED | OUTPATIENT
Start: 2025-03-21 | End: 2025-04-20

## 2025-04-22 DIAGNOSIS — F90.2 ATTENTION-DEFICIT HYPERACTIVITY DISORDER, COMBINED TYPE: ICD-10-CM

## 2025-04-22 DIAGNOSIS — G43.009 MIGRAINE WITHOUT AURA AND WITHOUT STATUS MIGRAINOSUS, NOT INTRACTABLE: ICD-10-CM

## 2025-04-22 RX ORDER — DEXTROAMPHETAMINE SACCHARATE, AMPHETAMINE ASPARTATE MONOHYDRATE, DEXTROAMPHETAMINE SULFATE AND AMPHETAMINE SULFATE 5; 5; 5; 5 MG/1; MG/1; MG/1; MG/1
20 CAPSULE, EXTENDED RELEASE ORAL DAILY
Qty: 30 CAPSULE | Refills: 0 | Status: SHIPPED | OUTPATIENT
Start: 2025-04-22 | End: 2025-05-22

## 2025-04-23 RX ORDER — RIZATRIPTAN BENZOATE 10 MG/1
TABLET, ORALLY DISINTEGRATING ORAL
Qty: 9 TABLET | Refills: 5 | Status: SHIPPED | OUTPATIENT
Start: 2025-04-23

## 2025-05-14 DIAGNOSIS — F90.2 ATTENTION-DEFICIT HYPERACTIVITY DISORDER, COMBINED TYPE: ICD-10-CM

## 2025-05-14 RX ORDER — DEXTROAMPHETAMINE SACCHARATE, AMPHETAMINE ASPARTATE, DEXTROAMPHETAMINE SULFATE AND AMPHETAMINE SULFATE 1.25; 1.25; 1.25; 1.25 MG/1; MG/1; MG/1; MG/1
TABLET ORAL
Qty: 20 TABLET | Refills: 0 | Status: SHIPPED | OUTPATIENT
Start: 2025-05-14 | End: 2025-07-16

## 2025-05-14 RX ORDER — DEXTROAMPHETAMINE SACCHARATE, AMPHETAMINE ASPARTATE MONOHYDRATE, DEXTROAMPHETAMINE SULFATE AND AMPHETAMINE SULFATE 5; 5; 5; 5 MG/1; MG/1; MG/1; MG/1
20 CAPSULE, EXTENDED RELEASE ORAL DAILY
Qty: 30 CAPSULE | Refills: 0 | Status: SHIPPED | OUTPATIENT
Start: 2025-05-14 | End: 2025-06-13

## 2025-05-21 RX ORDER — VALACYCLOVIR HYDROCHLORIDE 500 MG/1
TABLET, FILM COATED ORAL
Qty: 16 TABLET | Refills: 3 | Status: SHIPPED | OUTPATIENT
Start: 2025-05-21

## 2025-05-28 ENCOUNTER — PATIENT MESSAGE (OUTPATIENT)
Facility: CLINIC | Age: 35
End: 2025-05-28

## 2025-05-28 RX ORDER — ALBUTEROL SULFATE 90 UG/1
1 INHALANT RESPIRATORY (INHALATION) EVERY 4 HOURS PRN
Qty: 18 G | Refills: 0 | Status: SHIPPED | OUTPATIENT
Start: 2025-05-28

## 2025-06-04 DIAGNOSIS — M79.18 MYOFASCIAL PAIN: ICD-10-CM

## 2025-06-04 DIAGNOSIS — M54.2 NECK PAIN: Primary | ICD-10-CM

## 2025-06-18 DIAGNOSIS — F90.2 ATTENTION-DEFICIT HYPERACTIVITY DISORDER, COMBINED TYPE: ICD-10-CM

## 2025-06-18 DIAGNOSIS — F41.9 ANXIETY: ICD-10-CM

## 2025-06-18 DIAGNOSIS — G43.009 MIGRAINE WITHOUT AURA AND WITHOUT STATUS MIGRAINOSUS, NOT INTRACTABLE: ICD-10-CM

## 2025-06-19 RX ORDER — ONDANSETRON 4 MG/1
TABLET, ORALLY DISINTEGRATING ORAL
Qty: 30 TABLET | Refills: 4 | Status: SHIPPED | OUTPATIENT
Start: 2025-06-19

## 2025-06-19 RX ORDER — RIZATRIPTAN BENZOATE 10 MG/1
TABLET, ORALLY DISINTEGRATING ORAL
Qty: 9 TABLET | Refills: 5 | Status: SHIPPED | OUTPATIENT
Start: 2025-06-19

## 2025-06-19 RX ORDER — BUTALBITAL, ACETAMINOPHEN AND CAFFEINE 50; 325; 40 MG/1; MG/1; MG/1
1 TABLET ORAL EVERY 6 HOURS PRN
Qty: 60 TABLET | Refills: 4 | Status: SHIPPED | OUTPATIENT
Start: 2025-06-19

## 2025-06-20 RX ORDER — DEXTROAMPHETAMINE SACCHARATE, AMPHETAMINE ASPARTATE MONOHYDRATE, DEXTROAMPHETAMINE SULFATE AND AMPHETAMINE SULFATE 5; 5; 5; 5 MG/1; MG/1; MG/1; MG/1
20 CAPSULE, EXTENDED RELEASE ORAL DAILY
Qty: 30 CAPSULE | Refills: 0 | Status: SHIPPED | OUTPATIENT
Start: 2025-06-20 | End: 2025-07-20

## 2025-06-20 RX ORDER — CLONAZEPAM 0.5 MG/1
TABLET ORAL
Qty: 10 TABLET | Refills: 0 | Status: SHIPPED | OUTPATIENT
Start: 2025-06-20 | End: 2025-07-12

## 2025-06-20 RX ORDER — DEXTROAMPHETAMINE SACCHARATE, AMPHETAMINE ASPARTATE, DEXTROAMPHETAMINE SULFATE AND AMPHETAMINE SULFATE 1.25; 1.25; 1.25; 1.25 MG/1; MG/1; MG/1; MG/1
TABLET ORAL
Qty: 20 TABLET | Refills: 0 | Status: SHIPPED | OUTPATIENT
Start: 2025-06-20 | End: 2025-08-20

## 2025-07-03 ENCOUNTER — TELEMEDICINE (OUTPATIENT)
Facility: CLINIC | Age: 35
End: 2025-07-03
Payer: COMMERCIAL

## 2025-07-03 DIAGNOSIS — F90.2 ATTENTION-DEFICIT HYPERACTIVITY DISORDER, COMBINED TYPE: ICD-10-CM

## 2025-07-03 PROCEDURE — 99213 OFFICE O/P EST LOW 20 MIN: CPT | Performed by: INTERNAL MEDICINE

## 2025-07-03 RX ORDER — DEXTROAMPHETAMINE SACCHARATE, AMPHETAMINE ASPARTATE MONOHYDRATE, DEXTROAMPHETAMINE SULFATE AND AMPHETAMINE SULFATE 7.5; 7.5; 7.5; 7.5 MG/1; MG/1; MG/1; MG/1
30 CAPSULE, EXTENDED RELEASE ORAL DAILY
Qty: 30 CAPSULE | Refills: 0 | Status: SHIPPED | OUTPATIENT
Start: 2025-08-02 | End: 2025-09-01

## 2025-07-03 RX ORDER — DEXTROAMPHETAMINE SACCHARATE, AMPHETAMINE ASPARTATE MONOHYDRATE, DEXTROAMPHETAMINE SULFATE AND AMPHETAMINE SULFATE 7.5; 7.5; 7.5; 7.5 MG/1; MG/1; MG/1; MG/1
30 CAPSULE, EXTENDED RELEASE ORAL DAILY
Qty: 30 CAPSULE | Refills: 0 | Status: SHIPPED | OUTPATIENT
Start: 2025-09-01 | End: 2025-10-01

## 2025-07-03 RX ORDER — DEXTROAMPHETAMINE SACCHARATE, AMPHETAMINE ASPARTATE MONOHYDRATE, DEXTROAMPHETAMINE SULFATE AND AMPHETAMINE SULFATE 7.5; 7.5; 7.5; 7.5 MG/1; MG/1; MG/1; MG/1
30 CAPSULE, EXTENDED RELEASE ORAL DAILY
Qty: 30 CAPSULE | Refills: 0 | Status: SHIPPED | OUTPATIENT
Start: 2025-07-03 | End: 2025-08-02

## 2025-07-03 RX ORDER — DEXTROAMPHETAMINE SACCHARATE, AMPHETAMINE ASPARTATE, DEXTROAMPHETAMINE SULFATE AND AMPHETAMINE SULFATE 1.25; 1.25; 1.25; 1.25 MG/1; MG/1; MG/1; MG/1
TABLET ORAL
Qty: 20 TABLET | Refills: 0 | Status: SHIPPED | OUTPATIENT
Start: 2025-07-03 | End: 2025-09-02

## 2025-07-03 SDOH — ECONOMIC STABILITY: INCOME INSECURITY: IN THE LAST 12 MONTHS, WAS THERE A TIME WHEN YOU WERE NOT ABLE TO PAY THE MORTGAGE OR RENT ON TIME?: NO

## 2025-07-03 SDOH — ECONOMIC STABILITY: TRANSPORTATION INSECURITY
IN THE PAST 12 MONTHS, HAS THE LACK OF TRANSPORTATION KEPT YOU FROM MEDICAL APPOINTMENTS OR FROM GETTING MEDICATIONS?: NO

## 2025-07-03 SDOH — ECONOMIC STABILITY: FOOD INSECURITY: WITHIN THE PAST 12 MONTHS, THE FOOD YOU BOUGHT JUST DIDN'T LAST AND YOU DIDN'T HAVE MONEY TO GET MORE.: NEVER TRUE

## 2025-07-03 SDOH — ECONOMIC STABILITY: FOOD INSECURITY: WITHIN THE PAST 12 MONTHS, YOU WORRIED THAT YOUR FOOD WOULD RUN OUT BEFORE YOU GOT MONEY TO BUY MORE.: NEVER TRUE

## 2025-07-03 ASSESSMENT — PATIENT HEALTH QUESTIONNAIRE - PHQ9
SUM OF ALL RESPONSES TO PHQ QUESTIONS 1-9: 0
1. LITTLE INTEREST OR PLEASURE IN DOING THINGS: NOT AT ALL
2. FEELING DOWN, DEPRESSED OR HOPELESS: NOT AT ALL
SUM OF ALL RESPONSES TO PHQ QUESTIONS 1-9: 0

## 2025-07-03 NOTE — PROGRESS NOTES
Rina Mcginnis, was evaluated through a synchronous (real-time) audio-video encounter. The patient (or guardian if applicable) is aware that this is a billable service, which includes applicable co-pays. This Virtual Visit was conducted with patient's (and/or legal guardian's) consent. Patient identification was verified, and a caregiver was present when appropriate.   The patient was located at Home: [address] [place] [id number]  Provider was located at Facility (Appt Dept): [address] [place] [address]  Confirm you are appropriately licensed, registered, or certified to deliver care in the state where the patient is located as indicated above. If you are not or unsure, please re-schedule the visit: Yes, I confirm.     Rina Mcginnis (:  1990) is a Established patient, presenting virtually for evaluation of the following:      Below is the assessment and plan developed based on review of pertinent history, physical exam, labs, studies, and medications.     Assessment & Plan  Attention-deficit hyperactivity disorder, combined type  Not optimally at goal.  She is in a monogamous relationship with Jude and desires conception.  She has decreased her other medications and not on migraine medication.  Discussed with her OB/GYN who she reports okay to continue however patient has decreased her Adderall to 20 mg XR.  Notes that it is not optimally controlling her and therefore uses the 5 mg in the evening.  She notes that the 20 mg efficacy ends after about 5 hours.    After discussion we will try her on Adderall XR 30 mg and will use the 5 mg in the evening to add on.  She will also be in touch with her OB/GYN.    Anxiety  She has been off sertraline as she is desiring pregnancy as above.  She uses about 10 tablets of clonazepam over the course of 5 months.    Orders:    amphetamine-dextroamphetamine (ADDERALL, 5MG,) 5 MG tablet; Take 1 tablet p.o. as directed

## 2025-07-22 ENCOUNTER — TELEMEDICINE (OUTPATIENT)
Age: 35
End: 2025-07-22
Payer: COMMERCIAL

## 2025-07-22 DIAGNOSIS — M54.2 NECK PAIN: Primary | ICD-10-CM

## 2025-07-22 DIAGNOSIS — G43.909 MIGRAINE WITHOUT STATUS MIGRAINOSUS, NOT INTRACTABLE, UNSPECIFIED MIGRAINE TYPE: ICD-10-CM

## 2025-07-22 DIAGNOSIS — M79.18 MYOFASCIAL PAIN: ICD-10-CM

## 2025-07-22 PROCEDURE — 99214 OFFICE O/P EST MOD 30 MIN: CPT | Performed by: NURSE PRACTITIONER

## 2025-07-23 ENCOUNTER — OFFICE VISIT (OUTPATIENT)
Facility: CLINIC | Age: 35
End: 2025-07-23
Payer: COMMERCIAL

## 2025-07-23 VITALS
RESPIRATION RATE: 16 BRPM | HEART RATE: 96 BPM | WEIGHT: 177.2 LBS | BODY MASS INDEX: 27.81 KG/M2 | DIASTOLIC BLOOD PRESSURE: 80 MMHG | TEMPERATURE: 98.8 F | OXYGEN SATURATION: 99 % | SYSTOLIC BLOOD PRESSURE: 120 MMHG | HEIGHT: 67 IN

## 2025-07-23 DIAGNOSIS — H65.192 OTHER NON-RECURRENT ACUTE NONSUPPURATIVE OTITIS MEDIA OF LEFT EAR: Primary | ICD-10-CM

## 2025-07-23 PROCEDURE — 99213 OFFICE O/P EST LOW 20 MIN: CPT | Performed by: NURSE PRACTITIONER

## 2025-07-23 RX ORDER — AMOXICILLIN 500 MG/1
500 CAPSULE ORAL 2 TIMES DAILY
COMMUNITY
Start: 2025-07-15

## 2025-07-23 RX ORDER — DOXYCYCLINE HYCLATE 100 MG
100 TABLET ORAL 2 TIMES DAILY
Qty: 14 TABLET | Refills: 0 | Status: SHIPPED | OUTPATIENT
Start: 2025-07-23 | End: 2025-07-30

## 2025-07-23 ASSESSMENT — ENCOUNTER SYMPTOMS
RESPIRATORY NEGATIVE: 1
ABDOMINAL PAIN: 0
EYES NEGATIVE: 1
CONSTIPATION: 0
BLOOD IN STOOL: 0
SINUS PAIN: 0
SHORTNESS OF BREATH: 0
DIARRHEA: 0
RHINORRHEA: 0
GASTROINTESTINAL NEGATIVE: 1
NAUSEA: 0
SINUS PRESSURE: 0
BACK PAIN: 0
CHEST TIGHTNESS: 0
COUGH: 0
EYE REDNESS: 0
VOMITING: 0
EYE PAIN: 0

## 2025-07-23 NOTE — PROGRESS NOTES
Assessment and Plan     1. Other non-recurrent acute nonsuppurative otitis media of left ear: Amoxicillin stopped, will start Doxycycline. Rx sent to pharmacy. Tylenol for pain and fever as needed. Will consider ENT referral if symptoms continue after antibiotic treatment. Return instructions given. Pt agreed with plan.   -     doxycycline hyclate (VIBRA-TABS) 100 MG tablet; Take 1 tablet by mouth 2 times daily for 7 days, Disp-14 tablet, R-0Normal       Benefits, risks, possible drug interactions, and side effects of all new medications were reviewed with the patient. Pt verbalized understanding.    An electronic signature was used to authenticate this note.  Sandra Chavez, APRN - CNP  7/23/2025      Follow-up and Dispositions    Return if symptoms worsen or fail to improve.          History of Present Illness   Chief Complaint     Rina Mcginnis is a 35 y.o. female here for had concerns including Ear Pain.   Mrs. Mcginnis presents today with reports of L ear infection. Pt reports experiencing L ear pressure for about 2 weeks. She was seen at [facility] twice on 07/11/2025 and 07/15/2025, given oral antibiotics on second visit which she is taking with compliance. Pt continues to have L ear pressure and decrease hearing. Pain is improving. Denies ear discharge, nasal congestion.       Review of Systems  Review of Systems   Constitutional: Negative.  Negative for chills, fatigue, fever and unexpected weight change.   HENT:  Positive for ear pain (L ear pressure) and hearing loss. Negative for congestion, ear discharge, rhinorrhea, sinus pressure, sinus pain and tinnitus.    Eyes: Negative.  Negative for pain, redness and visual disturbance.   Respiratory: Negative.  Negative for cough, chest tightness and shortness of breath.    Cardiovascular: Negative.  Negative for chest pain and palpitations.   Gastrointestinal: Negative.  Negative for abdominal pain, blood in stool, constipation, diarrhea, nausea

## 2025-07-23 NOTE — PROGRESS NOTES
Administered    COVID-19, PFIZER PURPLE top, DILUTE for use, (age 12 y+), 30mcg/0.3mL 12/21/2020, 01/11/2021    Influenza Virus Vaccine 10/30/2017, 10/15/2018, 10/05/2020    TDaP, ADACEL (age 10y-64y), BOOSTRIX (age 10y+), IM, 0.5mL 01/01/2014   ,   Health Maintenance   Topic Date Due    Varicella vaccine (1 of 2 - 13+ 2-dose series) Never done    Hepatitis B vaccine (1 of 3 - 19+ 3-dose series) Never done    Cervical cancer screen  Never done    DTaP/Tdap/Td vaccine (2 - Td or Tdap) 01/01/2024    COVID-19 Vaccine (4 - 2024-25 season) 09/01/2024    Diabetes screen  Never done    Flu vaccine (1) 08/01/2025    Depression Screen  07/03/2026    Hepatitis C screen  Completed    HIV screen  Completed    Hepatitis A vaccine  Aged Out    Hib vaccine  Aged Out    HPV vaccine  Aged Out    Polio vaccine  Aged Out    Meningococcal (ACWY) vaccine  Aged Out    Meningococcal B vaccine  Aged Out    Pneumococcal 0-49 years Vaccine  Aged Out       PHYSICAL EXAMINATION:  [ INSTRUCTIONS:  \"[x]\" Indicates a positive item  \"[]\" Indicates a negative item  -- DELETE ALL ITEMS NOT EXAMINED]  Vital Signs: (As obtained by patient/caregiver or practitioner observation)    Blood pressure-  Heart rate-    Respiratory rate-    Temperature-  Pulse oximetry-     Constitutional: [] Appears well-developed and well-nourished [] No apparent distress      [] Abnormal-   Mental status  [] Alert and awake  [] Oriented to person/place/time []Able to follow commands      Eyes:  EOM    []  Normal  [] Abnormal-  Sclera  []  Normal  [] Abnormal -         Discharge []  None visible  [] Abnormal -    HENT:   [] Normocephalic, atraumatic.  [] Abnormal   [] Mouth/Throat: Mucous membranes are moist.     External Ears [] Normal  [] Abnormal-     Neck: [] No visualized mass     Pulmonary/Chest: [] Respiratory effort normal.  [] No visualized signs of difficulty breathing or respiratory distress        [] Abnormal-      Musculoskeletal:   [] Normal gait with no signs

## 2025-08-04 DIAGNOSIS — F90.2 ATTENTION-DEFICIT HYPERACTIVITY DISORDER, COMBINED TYPE: ICD-10-CM

## 2025-08-04 RX ORDER — DEXTROAMPHETAMINE SACCHARATE, AMPHETAMINE ASPARTATE MONOHYDRATE, DEXTROAMPHETAMINE SULFATE AND AMPHETAMINE SULFATE 7.5; 7.5; 7.5; 7.5 MG/1; MG/1; MG/1; MG/1
30 CAPSULE, EXTENDED RELEASE ORAL DAILY
Qty: 30 CAPSULE | Refills: 0 | Status: SHIPPED | OUTPATIENT
Start: 2025-08-04 | End: 2025-09-03

## 2025-08-04 RX ORDER — DEXTROAMPHETAMINE SACCHARATE, AMPHETAMINE ASPARTATE, DEXTROAMPHETAMINE SULFATE AND AMPHETAMINE SULFATE 1.25; 1.25; 1.25; 1.25 MG/1; MG/1; MG/1; MG/1
TABLET ORAL
Qty: 20 TABLET | Refills: 0 | Status: SHIPPED | OUTPATIENT
Start: 2025-08-04 | End: 2025-10-04

## 2025-09-04 DIAGNOSIS — G43.009 MIGRAINE WITHOUT AURA AND WITHOUT STATUS MIGRAINOSUS, NOT INTRACTABLE: ICD-10-CM

## 2025-09-04 DIAGNOSIS — F90.2 ATTENTION-DEFICIT HYPERACTIVITY DISORDER, COMBINED TYPE: ICD-10-CM

## 2025-09-04 RX ORDER — DEXTROAMPHETAMINE SACCHARATE, AMPHETAMINE ASPARTATE MONOHYDRATE, DEXTROAMPHETAMINE SULFATE AND AMPHETAMINE SULFATE 7.5; 7.5; 7.5; 7.5 MG/1; MG/1; MG/1; MG/1
30 CAPSULE, EXTENDED RELEASE ORAL DAILY
Qty: 30 CAPSULE | Refills: 0 | Status: SHIPPED | OUTPATIENT
Start: 2025-09-04 | End: 2025-10-04

## 2025-09-04 RX ORDER — RIZATRIPTAN BENZOATE 10 MG/1
TABLET, ORALLY DISINTEGRATING ORAL
Qty: 9 TABLET | Refills: 5 | Status: SHIPPED | OUTPATIENT
Start: 2025-09-04

## 2025-09-04 RX ORDER — DEXTROAMPHETAMINE SACCHARATE, AMPHETAMINE ASPARTATE, DEXTROAMPHETAMINE SULFATE AND AMPHETAMINE SULFATE 1.25; 1.25; 1.25; 1.25 MG/1; MG/1; MG/1; MG/1
TABLET ORAL
Qty: 20 TABLET | Refills: 0 | Status: SHIPPED | OUTPATIENT
Start: 2025-09-04 | End: 2025-11-04